# Patient Record
Sex: MALE | Race: OTHER | HISPANIC OR LATINO | URBAN - METROPOLITAN AREA
[De-identification: names, ages, dates, MRNs, and addresses within clinical notes are randomized per-mention and may not be internally consistent; named-entity substitution may affect disease eponyms.]

---

## 2018-08-01 ENCOUNTER — INPATIENT (INPATIENT)
Facility: HOSPITAL | Age: 53
LOS: 3 days | Discharge: HOME | DRG: 134 | End: 2018-08-05
Attending: INTERNAL MEDICINE | Admitting: INTERNAL MEDICINE
Payer: MEDICAID

## 2018-08-01 VITALS
RESPIRATION RATE: 18 BRPM | WEIGHT: 160.06 LBS | HEIGHT: 64 IN | SYSTOLIC BLOOD PRESSURE: 112 MMHG | HEART RATE: 88 BPM | OXYGEN SATURATION: 98 % | DIASTOLIC BLOOD PRESSURE: 58 MMHG | TEMPERATURE: 99 F

## 2018-08-01 DIAGNOSIS — J36 PERITONSILLAR ABSCESS: ICD-10-CM

## 2018-08-01 DIAGNOSIS — K29.70 GASTRITIS, UNSPECIFIED, WITHOUT BLEEDING: ICD-10-CM

## 2018-08-01 DIAGNOSIS — K46.9 UNSPECIFIED ABDOMINAL HERNIA WITHOUT OBSTRUCTION OR GANGRENE: Chronic | ICD-10-CM

## 2018-08-01 DIAGNOSIS — Z29.9 ENCOUNTER FOR PROPHYLACTIC MEASURES, UNSPECIFIED: ICD-10-CM

## 2018-08-01 LAB
ALBUMIN SERPL ELPH-MCNC: 3.7 G/DL — SIGNIFICANT CHANGE UP (ref 3.5–5)
ALP SERPL-CCNC: 88 U/L — SIGNIFICANT CHANGE UP (ref 40–120)
ALT FLD-CCNC: 35 U/L DA — SIGNIFICANT CHANGE UP (ref 10–60)
ANION GAP SERPL CALC-SCNC: 5 MMOL/L — SIGNIFICANT CHANGE UP (ref 5–17)
AST SERPL-CCNC: 25 U/L — SIGNIFICANT CHANGE UP (ref 10–40)
BASOPHILS # BLD AUTO: 0.1 K/UL — SIGNIFICANT CHANGE UP (ref 0–0.2)
BASOPHILS NFR BLD AUTO: 0.6 % — SIGNIFICANT CHANGE UP (ref 0–2)
BILIRUB SERPL-MCNC: 1 MG/DL — SIGNIFICANT CHANGE UP (ref 0.2–1.2)
BUN SERPL-MCNC: 19 MG/DL — HIGH (ref 7–18)
CALCIUM SERPL-MCNC: 8.9 MG/DL — SIGNIFICANT CHANGE UP (ref 8.4–10.5)
CHLORIDE SERPL-SCNC: 106 MMOL/L — SIGNIFICANT CHANGE UP (ref 96–108)
CO2 SERPL-SCNC: 26 MMOL/L — SIGNIFICANT CHANGE UP (ref 22–31)
CREAT SERPL-MCNC: 0.84 MG/DL — SIGNIFICANT CHANGE UP (ref 0.5–1.3)
EOSINOPHIL # BLD AUTO: 0.1 K/UL — SIGNIFICANT CHANGE UP (ref 0–0.5)
EOSINOPHIL NFR BLD AUTO: 0.6 % — SIGNIFICANT CHANGE UP (ref 0–6)
GLUCOSE SERPL-MCNC: 81 MG/DL — SIGNIFICANT CHANGE UP (ref 70–99)
HCT VFR BLD CALC: 52.9 % — HIGH (ref 39–50)
HGB BLD-MCNC: 17 G/DL — SIGNIFICANT CHANGE UP (ref 13–17)
LYMPHOCYTES # BLD AUTO: 1.5 K/UL — SIGNIFICANT CHANGE UP (ref 1–3.3)
LYMPHOCYTES # BLD AUTO: 13 % — SIGNIFICANT CHANGE UP (ref 13–44)
MCHC RBC-ENTMCNC: 31.5 PG — SIGNIFICANT CHANGE UP (ref 27–34)
MCHC RBC-ENTMCNC: 32.2 GM/DL — SIGNIFICANT CHANGE UP (ref 32–36)
MCV RBC AUTO: 97.8 FL — SIGNIFICANT CHANGE UP (ref 80–100)
MONOCYTES # BLD AUTO: 1.1 K/UL — HIGH (ref 0–0.9)
MONOCYTES NFR BLD AUTO: 9.7 % — SIGNIFICANT CHANGE UP (ref 2–14)
NEUTROPHILS # BLD AUTO: 8.7 K/UL — HIGH (ref 1.8–7.4)
NEUTROPHILS NFR BLD AUTO: 76.1 % — SIGNIFICANT CHANGE UP (ref 43–77)
PLATELET # BLD AUTO: 327 K/UL — SIGNIFICANT CHANGE UP (ref 150–400)
POTASSIUM SERPL-MCNC: 4.2 MMOL/L — SIGNIFICANT CHANGE UP (ref 3.5–5.3)
POTASSIUM SERPL-SCNC: 4.2 MMOL/L — SIGNIFICANT CHANGE UP (ref 3.5–5.3)
PROT SERPL-MCNC: 8.3 G/DL — SIGNIFICANT CHANGE UP (ref 6–8.3)
RBC # BLD: 5.4 M/UL — SIGNIFICANT CHANGE UP (ref 4.2–5.8)
RBC # FLD: 11.8 % — SIGNIFICANT CHANGE UP (ref 10.3–14.5)
SODIUM SERPL-SCNC: 137 MMOL/L — SIGNIFICANT CHANGE UP (ref 135–145)
WBC # BLD: 11.4 K/UL — HIGH (ref 3.8–10.5)
WBC # FLD AUTO: 11.4 K/UL — HIGH (ref 3.8–10.5)

## 2018-08-01 PROCEDURE — 99285 EMERGENCY DEPT VISIT HI MDM: CPT

## 2018-08-01 PROCEDURE — 70491 CT SOFT TISSUE NECK W/DYE: CPT | Mod: 26

## 2018-08-01 RX ORDER — SODIUM CHLORIDE 9 MG/ML
1000 INJECTION INTRAMUSCULAR; INTRAVENOUS; SUBCUTANEOUS
Qty: 0 | Refills: 0 | Status: DISCONTINUED | OUTPATIENT
Start: 2018-08-01 | End: 2018-08-05

## 2018-08-01 RX ORDER — ACETAMINOPHEN 500 MG
650 TABLET ORAL EVERY 6 HOURS
Qty: 0 | Refills: 0 | Status: DISCONTINUED | OUTPATIENT
Start: 2018-08-01 | End: 2018-08-05

## 2018-08-01 RX ORDER — MORPHINE SULFATE 50 MG/1
4 CAPSULE, EXTENDED RELEASE ORAL ONCE
Qty: 0 | Refills: 0 | Status: DISCONTINUED | OUTPATIENT
Start: 2018-08-01 | End: 2018-08-01

## 2018-08-01 RX ORDER — DEXAMETHASONE 0.5 MG/5ML
4 ELIXIR ORAL EVERY 6 HOURS
Qty: 0 | Refills: 0 | Status: DISCONTINUED | OUTPATIENT
Start: 2018-08-01 | End: 2018-08-04

## 2018-08-01 RX ORDER — SODIUM CHLORIDE 9 MG/ML
3 INJECTION INTRAMUSCULAR; INTRAVENOUS; SUBCUTANEOUS EVERY 8 HOURS
Qty: 0 | Refills: 0 | Status: DISCONTINUED | OUTPATIENT
Start: 2018-08-01 | End: 2018-08-05

## 2018-08-01 RX ORDER — DEXAMETHASONE 0.5 MG/5ML
10 ELIXIR ORAL ONCE
Qty: 0 | Refills: 0 | Status: COMPLETED | OUTPATIENT
Start: 2018-08-01 | End: 2018-08-01

## 2018-08-01 RX ADMIN — MORPHINE SULFATE 4 MILLIGRAM(S): 50 CAPSULE, EXTENDED RELEASE ORAL at 15:12

## 2018-08-01 RX ADMIN — Medication 10 MILLIGRAM(S): at 14:28

## 2018-08-01 RX ADMIN — Medication 100 MILLIGRAM(S): at 17:29

## 2018-08-01 RX ADMIN — SODIUM CHLORIDE 3 MILLILITER(S): 9 INJECTION INTRAMUSCULAR; INTRAVENOUS; SUBCUTANEOUS at 17:45

## 2018-08-01 RX ADMIN — SODIUM CHLORIDE 75 MILLILITER(S): 9 INJECTION INTRAMUSCULAR; INTRAVENOUS; SUBCUTANEOUS at 23:09

## 2018-08-01 RX ADMIN — Medication 100 MILLIGRAM(S): at 21:58

## 2018-08-01 RX ADMIN — MORPHINE SULFATE 4 MILLIGRAM(S): 50 CAPSULE, EXTENDED RELEASE ORAL at 16:31

## 2018-08-01 NOTE — ED PROVIDER NOTE - PROGRESS NOTE DETAILS
Discussed with Dr. Myers; requesting CT scan of neck. Will evaluate after 1700. Discussed with ENT Dr. Myers; requesting CT scan of neck. Will evaluate after 1700. Dr Myers recommends admisson, he will evaluate as an inpatient.

## 2018-08-01 NOTE — ED ADULT NURSE NOTE - NSSISCREENINGQ3_ED_A_ED
Pt was scheduled on Dec 8/17 for a mammogram and cancelled, because she prefers an Ultrasound. In the appt notes she said that will request an order from her provider.   No

## 2018-08-01 NOTE — H&P ADULT - PROBLEM SELECTOR PLAN 1
neck swelling, muffled voice and dysphagia to solid food.   -Patient is protecting airway. ***  -CT neck positive for right sided peritonsillar abscess  -will c/w clindamycin 900 mg Iv Q8 hrs as patient is allergic to penicilline.   -f/u blood cultures  -c/w acetaminophen and cepacol for pain control  -will give decadrone 4g Q6 hrs x 3 days   -IV fluids for hydration.  -ENT Dr Myers consult appreciated  -ID dr Salcido

## 2018-08-01 NOTE — H&P ADULT - NSHPPHYSICALEXAM_GEN_ALL_CORE
· CONSTITUTIONAL: Well appearing, well nourished, awake, alert, oriented to person, place, time/situation and in no apparent distress.  · HENMT: - - -  · Face: no lymph node enlargement  · Throat Findings: UVULAR DEVIATION, PERITONSILLAR ABSCESS, TONSILLAR SWELLING, mild trismus, consistent with peritonsillar abscess, mild right pharyngeal tonsillar swelling  · NEUROLOGICAL: Alert and oriented, no focal deficits, no motor or sensory deficits.  · SKIN: Skin normal color for race, warm, dry and intact. No evidence of rash.  · PSYCHIATRIC: Alert and oriented to person, place, time/situation. normal mood and affect. no apparent risk to self or others.

## 2018-08-01 NOTE — ED ADULT TRIAGE NOTE - CHIEF COMPLAINT QUOTE
sent from urgent care for eval natalie tonsilar abscess , OMS consult . pt c/o tonsilitis x 4 days not responding to PO antibiotics

## 2018-08-01 NOTE — ED PROVIDER NOTE - OBJECTIVE STATEMENT
51 y/o M presents with PMHx of pancreatitis, H. pylori and no significant PSHx presents to the ED with complaints of tonsillar swelling and pain x 5 days. Patient was seen by at hospital and treated with course of Clarithromycin 4 days ago. Patient was seen by family doctor today and told to have worsening retropharyngeal abscess. Patient denies fever, chills or any other complaints. Allergies: PCN (hives and rashes).

## 2018-08-01 NOTE — ED ADULT NURSE NOTE - NSIMPLEMENTINTERV_GEN_ALL_ED
Implemented All Universal Safety Interventions:  [place] to call system. Call bell, personal items and telephone within reach. Instruct patient to call for assistance. Room bathroom lighting operational. Non-slip footwear when patient is off stretcher. Physically safe environment: no spills, clutter or unnecessary equipment. Stretcher in lowest position, wheels locked, appropriate side rails in place.

## 2018-08-01 NOTE — H&P ADULT - HISTORY OF PRESENT ILLNESS
Patient is a 53 y/o M from Home with PMH of gastritis, pancreatitis 2/2 alcohol intake presented with complaint of  dysphagia, right sided facial pain, ear pain and neck pain. Patient complaint of severe neck pain and face pain started 2 days ago progressively worsening to a point that now He has difficulty swallow solid food. Patient also reported that his voice has become muffled. Patient denied any recent sick contacts, similar episodes in the past, diarrhea.In ED patient's vitals were stable, wbc count of 12K. CT neck was performed which showed a natalie-tonsillar abscess on right side and Sub mandibular gland inflammation. Patient recieved 1 dose of clindamycin in ED and Decadron. patient is protecting his airway. ENT was consulted by ED provider. No need of Urgent I&D, as per ENT Dr metcalf.

## 2018-08-01 NOTE — H&P ADULT - ASSESSMENT
Patient is a 53 y/o M from Home with PMH of gastritis, pancreatitis 2/2 alcohol intake presented with complaint of  dysphagia, right sided facial pain, ear pain and neck pain. Patient complaint of severe neck pain and face pain started 2 days ago progressively worsening to a point that now He has difficulty swallow solid food. Patient also reported that his voice has become muffled.

## 2018-08-01 NOTE — ED PROVIDER NOTE - MEDICAL DECISION MAKING DETAILS
51 y/o M presents with right peritonsillar abscess with mild trismus; will contact ears, nose, throat for consultation.

## 2018-08-01 NOTE — ED PROVIDER NOTE - THROAT FINDINGS
mild trismus, consistent with peritonsillar abscess, mild right pharyngeal tonsillar swelling/UVULAR DEVIATION/TONSILLAR SWELLING/PERITONSILLAR ABSCESS

## 2018-08-01 NOTE — H&P ADULT - PROBLEM SELECTOR PLAN 3
RISK                                                          Points  [] Previous VTE                                           3  [] Thrombophilia                                        2  [] Lower limb paralysis                              2   [] Current Cancer                                       2   [] Immobilization > 24 hrs                        1  [] ICU/CCU stay > 24 hours                       1  [] Age > 60                                                   1    IMPROVE score 0. No need of DVT prophylaxis.

## 2018-08-02 DIAGNOSIS — R07.0 PAIN IN THROAT: ICD-10-CM

## 2018-08-02 DIAGNOSIS — K29.70 GASTRITIS, UNSPECIFIED, WITHOUT BLEEDING: ICD-10-CM

## 2018-08-02 LAB
ALBUMIN SERPL ELPH-MCNC: 3.5 G/DL — SIGNIFICANT CHANGE UP (ref 3.5–5)
ALP SERPL-CCNC: 85 U/L — SIGNIFICANT CHANGE UP (ref 40–120)
ALT FLD-CCNC: 35 U/L DA — SIGNIFICANT CHANGE UP (ref 10–60)
ANION GAP SERPL CALC-SCNC: 8 MMOL/L — SIGNIFICANT CHANGE UP (ref 5–17)
AST SERPL-CCNC: 20 U/L — SIGNIFICANT CHANGE UP (ref 10–40)
BASOPHILS # BLD AUTO: 0 K/UL — SIGNIFICANT CHANGE UP (ref 0–0.2)
BASOPHILS NFR BLD AUTO: 0.1 % — SIGNIFICANT CHANGE UP (ref 0–2)
BILIRUB SERPL-MCNC: 0.7 MG/DL — SIGNIFICANT CHANGE UP (ref 0.2–1.2)
BUN SERPL-MCNC: 21 MG/DL — HIGH (ref 7–18)
CALCIUM SERPL-MCNC: 9.5 MG/DL — SIGNIFICANT CHANGE UP (ref 8.4–10.5)
CHLORIDE SERPL-SCNC: 103 MMOL/L — SIGNIFICANT CHANGE UP (ref 96–108)
CO2 SERPL-SCNC: 25 MMOL/L — SIGNIFICANT CHANGE UP (ref 22–31)
CREAT SERPL-MCNC: 0.85 MG/DL — SIGNIFICANT CHANGE UP (ref 0.5–1.3)
EOSINOPHIL # BLD AUTO: 0 K/UL — SIGNIFICANT CHANGE UP (ref 0–0.5)
EOSINOPHIL NFR BLD AUTO: 0 % — SIGNIFICANT CHANGE UP (ref 0–6)
GLUCOSE SERPL-MCNC: 164 MG/DL — HIGH (ref 70–99)
HBA1C BLD-MCNC: 6.1 % — HIGH (ref 4–5.6)
HCT VFR BLD CALC: 51.1 % — HIGH (ref 39–50)
HGB BLD-MCNC: 16.7 G/DL — SIGNIFICANT CHANGE UP (ref 13–17)
LYMPHOCYTES # BLD AUTO: 1 K/UL — SIGNIFICANT CHANGE UP (ref 1–3.3)
LYMPHOCYTES # BLD AUTO: 10.1 % — LOW (ref 13–44)
MAGNESIUM SERPL-MCNC: 2.5 MG/DL — SIGNIFICANT CHANGE UP (ref 1.6–2.6)
MCHC RBC-ENTMCNC: 31.9 PG — SIGNIFICANT CHANGE UP (ref 27–34)
MCHC RBC-ENTMCNC: 32.7 GM/DL — SIGNIFICANT CHANGE UP (ref 32–36)
MCV RBC AUTO: 97.6 FL — SIGNIFICANT CHANGE UP (ref 80–100)
MONOCYTES # BLD AUTO: 0.1 K/UL — SIGNIFICANT CHANGE UP (ref 0–0.9)
MONOCYTES NFR BLD AUTO: 1.2 % — LOW (ref 2–14)
NEUTROPHILS # BLD AUTO: 8.5 K/UL — HIGH (ref 1.8–7.4)
NEUTROPHILS NFR BLD AUTO: 88.5 % — HIGH (ref 43–77)
PHOSPHATE SERPL-MCNC: 3.4 MG/DL — SIGNIFICANT CHANGE UP (ref 2.5–4.5)
PLATELET # BLD AUTO: 355 K/UL — SIGNIFICANT CHANGE UP (ref 150–400)
POTASSIUM SERPL-MCNC: 4.8 MMOL/L — SIGNIFICANT CHANGE UP (ref 3.5–5.3)
POTASSIUM SERPL-SCNC: 4.8 MMOL/L — SIGNIFICANT CHANGE UP (ref 3.5–5.3)
PROT SERPL-MCNC: 8.4 G/DL — HIGH (ref 6–8.3)
RBC # BLD: 5.23 M/UL — SIGNIFICANT CHANGE UP (ref 4.2–5.8)
RBC # FLD: 11.8 % — SIGNIFICANT CHANGE UP (ref 10.3–14.5)
SODIUM SERPL-SCNC: 136 MMOL/L — SIGNIFICANT CHANGE UP (ref 135–145)
WBC # BLD: 9.5 K/UL — SIGNIFICANT CHANGE UP (ref 3.8–10.5)
WBC # FLD AUTO: 9.5 K/UL — SIGNIFICANT CHANGE UP (ref 3.8–10.5)

## 2018-08-02 RX ADMIN — SODIUM CHLORIDE 3 MILLILITER(S): 9 INJECTION INTRAMUSCULAR; INTRAVENOUS; SUBCUTANEOUS at 06:00

## 2018-08-02 RX ADMIN — Medication 100 MILLIGRAM(S): at 13:01

## 2018-08-02 RX ADMIN — Medication 4 MILLIGRAM(S): at 11:13

## 2018-08-02 RX ADMIN — SODIUM CHLORIDE 3 MILLILITER(S): 9 INJECTION INTRAMUSCULAR; INTRAVENOUS; SUBCUTANEOUS at 21:44

## 2018-08-02 RX ADMIN — Medication 100 MILLIGRAM(S): at 21:39

## 2018-08-02 RX ADMIN — Medication 4 MILLIGRAM(S): at 00:57

## 2018-08-02 RX ADMIN — Medication 30 MILLILITER(S): at 23:56

## 2018-08-02 RX ADMIN — SODIUM CHLORIDE 3 MILLILITER(S): 9 INJECTION INTRAMUSCULAR; INTRAVENOUS; SUBCUTANEOUS at 15:03

## 2018-08-02 RX ADMIN — Medication 4 MILLIGRAM(S): at 17:25

## 2018-08-02 RX ADMIN — Medication 100 MILLIGRAM(S): at 05:56

## 2018-08-02 RX ADMIN — Medication 4 MILLIGRAM(S): at 23:56

## 2018-08-02 RX ADMIN — Medication 4 MILLIGRAM(S): at 05:57

## 2018-08-02 NOTE — CONSULT NOTE ADULT - RS GEN PE MLT RESP DETAILS PC
no rhonchi/no rales/no wheezes/clear to auscultation bilaterally/good air movement no rhonchi/no wheezes/clear to auscultation bilaterally/no rales/good air movement/breath sounds equal

## 2018-08-02 NOTE — PROGRESS NOTE ADULT - SUBJECTIVE AND OBJECTIVE BOX
Patient is a 52y old  Male who presents with a chief complaint of right sided neck and face pain (01 Aug 2018 22:20)    pt seen in icu [  ], reg med floor [  x ], bed [  x], chair at bedside [   ], a+o x3 [ x ], lethargic [  ],  nad [ x ]      Allergies    penicillin (Hives; Rash)        Vitals    T(F): 98.2 (08-02-18 @ 14:18), Max: 98.7 (08-01-18 @ 18:00)  HR: 86 (08-02-18 @ 14:18) (74 - 91)  BP: 114/84 (08-02-18 @ 14:18) (94/62 - 118/81)  RR: 16 (08-02-18 @ 14:18) (15 - 17)  SpO2: 100% (08-02-18 @ 14:18) (95% - 100%)  Wt(kg): --  CAPILLARY BLOOD GLUCOSE          Labs                          16.7   9.5   )-----------( 355      ( 02 Aug 2018 08:03 )             51.1       08-02    136  |  103  |  21<H>  ----------------------------<  164<H>  4.8   |  25  |  0.85    Ca    9.5      02 Aug 2018 08:03  Phos  3.4     08-02  Mg     2.5     08-02    TPro  8.4<H>  /  Alb  3.5  /  TBili  0.7  /  DBili  x   /  AST  20  /  ALT  35  /  AlkPhos  85  08-02                Radiology Results      Meds    MEDICATIONS  (STANDING):  clindamycin IVPB 900 milliGRAM(s) IV Intermittent every 8 hours  dexamethasone  Injectable 4 milliGRAM(s) IV Push every 6 hours  sodium chloride 0.9% lock flush 3 milliLiter(s) IV Push every 8 hours  sodium chloride 0.9%. 1000 milliLiter(s) (75 mL/Hr) IV Continuous <Continuous>      MEDICATIONS  (PRN):  acetaminophen   Tablet 650 milliGRAM(s) Oral every 6 hours PRN For Temp greater than 38 C (100.4 F)  acetaminophen   Tablet. 650 milliGRAM(s) Oral every 6 hours PRN Severe Pain (7 - 10)      Physical Exam    HEENT : right tonsillar swelling with surgical scar  Neuro :  no focal deficits  Respiratory: CTA B/L  CV: RRR, S1S2, no murmurs,   Abdominal: Soft, NT, ND +BS,  Extremities: No edema, + peripheral pulses    ASSESSMENT    Peritonsillar abscess  h/o Gastritis, Helicobacter pylori  Pancreatitis  Hernia  No significant past surgical history      PLAN    ent cons noted  s/p incision and drainage of Right PTA at bedside  f/u in ENT clinic in 1 week [phone]  cont clindamycin 900 mgs iv q8hrs  cont decadron 4mgs iv q6hrs x 1-2 days more  once doing better will plan to dc home in 1-2 days on po abxs   tylnol prn pain  cont current meds Patient is a 51 y/o M from Home with PMH of gastritis, pancreatitis 2/2 alcohol intake presented with complaint of  dysphagia, right sided facial pain, ear pain and neck pain. Patient complaint of severe neck pain and face pain started 2 days ago progressively worsening to a point that now He has difficulty swallow solid food. Patient also reported that his voice has become muffled. Patient denied any recent sick contacts, similar episodes in the past, diarrhea.In ED patient's vitals were stable, wbc count of 12K. CT neck was performed which showed a natalie-tonsillar abscess on right side and Sub mandibular gland inflammation. Patient recieved 1 dose of clindamycin in ED and Decadron. patient is protecting his airway. ENT was consulted by ED provider. No need of Urgent I&D, as per ENT Dr metcalf.      Review of Systems:  Other Review of Systems: All other review of systems negative, except as noted in HPI	      pt seen in icu [  ], reg med floor [  x ], bed [  x], chair at bedside [   ], a+o x3 [ x ], lethargic [  ],  nad [ x ]      Allergies    penicillin (Hives; Rash)        Vitals    T(F): 98.2 (08-02-18 @ 14:18), Max: 98.7 (08-01-18 @ 18:00)  HR: 86 (08-02-18 @ 14:18) (74 - 91)  BP: 114/84 (08-02-18 @ 14:18) (94/62 - 118/81)  RR: 16 (08-02-18 @ 14:18) (15 - 17)  SpO2: 100% (08-02-18 @ 14:18) (95% - 100%)  Wt(kg): --  CAPILLARY BLOOD GLUCOSE          Labs                          16.7   9.5   )-----------( 355      ( 02 Aug 2018 08:03 )             51.1       08-02    136  |  103  |  21<H>  ----------------------------<  164<H>  4.8   |  25  |  0.85    Ca    9.5      02 Aug 2018 08:03  Phos  3.4     08-02  Mg     2.5     08-02    TPro  8.4<H>  /  Alb  3.5  /  TBili  0.7  /  DBili  x   /  AST  20  /  ALT  35  /  AlkPhos  85  08-02                Radiology Results      Meds    MEDICATIONS  (STANDING):  clindamycin IVPB 900 milliGRAM(s) IV Intermittent every 8 hours  dexamethasone  Injectable 4 milliGRAM(s) IV Push every 6 hours  sodium chloride 0.9% lock flush 3 milliLiter(s) IV Push every 8 hours  sodium chloride 0.9%. 1000 milliLiter(s) (75 mL/Hr) IV Continuous <Continuous>      MEDICATIONS  (PRN):  acetaminophen   Tablet 650 milliGRAM(s) Oral every 6 hours PRN For Temp greater than 38 C (100.4 F)  acetaminophen   Tablet. 650 milliGRAM(s) Oral every 6 hours PRN Severe Pain (7 - 10)      Physical Exam    HEENT : right tonsillar swelling with surgical scar  Neuro :  no focal deficits  Respiratory: CTA B/L  CV: RRR, S1S2, no murmurs,   Abdominal: Soft, NT, ND +BS,  Extremities: No edema, + peripheral pulses    ASSESSMENT    Peritonsillar abscess  h/o Gastritis, Helicobacter pylori  Pancreatitis  Hernia  No significant past surgical history      PLAN    ent cons noted  s/p incision and drainage of Right PTA at bedside  f/u in ENT clinic in 1 week [phone]  cont clindamycin 900 mgs iv q8hrs  cont decadron 4mgs iv q6hrs x 1-2 days more  once doing better will plan to dc home in 1-2 days on po abxs   tylnol prn pain  cont current meds

## 2018-08-02 NOTE — CONSULT NOTE ADULT - SUBJECTIVE AND OBJECTIVE BOX
History of Present Illness: 	  Patient is a 51 y/o M from Home with PMH of gastritis, pancreatitis 2/2 alcohol intake presented with complaint of  dysphagia, right sided facial pain, ear pain and neck pain. Patient complaint of severe neck pain and face pain started 2 days ago progressively worsening to a point that now He has difficulty swallow solid food. Patient also reported that his voice has become muffled. Patient denied any recent sick contacts, similar episodes in the past, diarrhea.In ED patient's vitals were stable, wbc count of 12K. CT neck was performed which showed a natalie-tonsillar abscess on right side and Sub mandibular gland inflammation. Patient recieved 1 dose of clindamycin in ED and Decadron. patient is protecting his airway. ENT was consulted by ED provider. No need of Urgent I&D, as per ENT Dr metcalf.       Review of Systems:  Other Review of Systems: All other review of systems negative, except as noted in HPI	      Allergies and Intolerances:        Allergies:  	penicillin: Drug, Hives, Rash    Home Medications:   * Patient Currently Takes Medications as of 01-Aug-2018 22:26 documented in Structured Notes  · 	pantoprazole 40 mg oral delayed release tablet: 1 tab(s) orally once a day, Last Dose Taken:      .    Patient History:    Past Medical History:  Gastritis, Helicobacter pylori    Pancreatitis.     Past Surgical History:  Hernia.     Family History:  Mother  Still living? Unknown  Family history of irritable colon, Age at diagnosis: Age Unknown.     Social History:  Social History (marital status, living situation, occupation, tobacco use, alcohol and drug use, and sexual history): , works as a , prior ETOH use, No Smoking, no recreational substance use	     Tobacco Screening:  · Core Measure Site	Yes	  · Has the patient used tobacco in the past 30 days?	No	    Risk Assessment:    Present on Admission:  Deep Venous Thrombosis	no	  Pulmonary Embolus	no	  Urinary Catheter	no	  Central Venous Catheter/PICC Line	no	  Surgical Site Incision	no	  Pressure Ulcer(s)	no	     Heart Failure:  Does this patient have a history of or has been diagnosed with heart failure? no.    HIV Screen (per [facility], HIV screening must be offered to every individual between ages 13 and 64)	Offered and patient declined	  Hepatitis C Screen (per [facility], hepatitis C screening must be offered to every individual born between 1945 and 1965)	Offered and patient declined	       Physical Exam:  Vital Signs Last 24 Hrs T(C): 36.8 (02 Aug 2018 00:11), Max: 37.1 (01 Aug 2018 11:58) T(F): 98.2 (02 Aug 2018 00:11), Max: 98.8 (01 Aug 2018 11:58) HR: 82 (02 Aug 2018 00:11) (81 - 91) BP: 112/77 (02 Aug 2018 00:11) (112/58 - 118/81) BP(mean): -- RR: 15 (02 Aug 2018 00:11) (15 - 18) SpO2: 95% (02 Aug 2018 00:11) (95% - 98%)  · CONSTITUTIONAL: Well appearing, well nourished, awake, alert, oriented to person, place, time/situation and in no apparent distress.    Ears: au tmi, eac clear, no marianne     Nose: clear b/l  · Face: no lymph node enlargement  · Throat Findings: UVULAR DEVIATION, PERITONSILLAR ABSCESS, TONSILLAR SWELLING, mild trismus, consistent with peritonsillar abscess, mild right pharyngeal tonsillar swelling  · NEUROLOGICAL: Alert and oriented, no focal deficits, no motor or sensory deficits.  · SKIN: Skin normal color for race, warm, dry and intact. No evidence of rash. · PSYCHIATRIC: Alert and oriented to person, place, time/situation. normal mood and affect. no apparent risk to self or others.	      Assessment:  52 year old male with right Peritonsillar abscess     Plan:  1) Incision and drainage of Right PTA at bedside  2) soft diet x 2 days then adat  3) decadron 10mg IV q8hrs x 3 doses  4) clindamycin x 7 datys  5) f/u in ENT clinic in 1 week [phone]

## 2018-08-02 NOTE — CONSULT NOTE ADULT - SUBJECTIVE AND OBJECTIVE BOX
HPI:  Patient is a 53 y/o M from Home with PMH of gastritis, pancreatitis 2/2 alcohol intake presented with complaint of  dysphagia, right sided facial pain, ear pain and neck pain. Patient complaint of severe neck pain and face pain started 2 days ago progressively worsening to a point that now He has difficulty swallow solid food. Patient also reported that his voice has become muffled. Patient denied any recent sick contacts, similar episodes in the past, diarrhea.In ED patient's vitals were stable, wbc count of 12K. CT neck was performed which showed a natalie-tonsillar abscess on right side and Sub mandibular gland inflammation. Patient recieved 1 dose of clindamycin in ED and Decadron. patient is protecting his airway. ENT was consulted by ED provider. No need of Urgent I&D, as per ENT Dr metcalf. (01 Aug 2018 22:20)      PAST MEDICAL & SURGICAL HISTORY:  Gastritis, Helicobacter pylori  Pancreatitis  Hernia      penicillin (Hives; Rash)      Meds:  acetaminophen   Tablet 650 milliGRAM(s) Oral every 6 hours PRN  acetaminophen   Tablet. 650 milliGRAM(s) Oral every 6 hours PRN  clindamycin IVPB 900 milliGRAM(s) IV Intermittent every 8 hours  dexamethasone  Injectable 4 milliGRAM(s) IV Push every 6 hours  sodium chloride 0.9% lock flush 3 milliLiter(s) IV Push every 8 hours  sodium chloride 0.9%. 1000 milliLiter(s) IV Continuous <Continuous>      SOCIAL HISTORY:  Smoker:   ETOH use:      FAMILY HISTORY:  Family history of irritable colon (Mother)      VITALS:  Vital Signs Last 24 Hrs  T(C): 36.4 (02 Aug 2018 05:23), Max: 37.1 (01 Aug 2018 11:58)  T(F): 97.6 (02 Aug 2018 05:23), Max: 98.8 (01 Aug 2018 11:58)  HR: 74 (02 Aug 2018 05:23) (74 - 91)  BP: 94/62 (02 Aug 2018 05:23) (94/62 - 118/81)  BP(mean): --  RR: 15 (02 Aug 2018 05:23) (15 - 18)  SpO2: 97% (02 Aug 2018 05:23) (95% - 98%)    LABS/DIAGNOSTIC TESTS:                          16.7   9.5   )-----------( 355      ( 02 Aug 2018 08:03 )             51.1     WBC Count: 9.5 K/uL (08-02 @ 08:03)  WBC Count: 11.4 K/uL (08-01 @ 14:43)      08-02    136  |  103  |  21<H>  ----------------------------<  164<H>  4.8   |  25  |  0.85    Ca    9.5      02 Aug 2018 08:03  Phos  3.4     08-02  Mg     2.5     08-02    TPro  8.4<H>  /  Alb  3.5  /  TBili  0.7  /  DBili  x   /  AST  20  /  ALT  35  /  AlkPhos  85  08-02          LIVER FUNCTIONS - ( 02 Aug 2018 08:03 )  Alb: 3.5 g/dL / Pro: 8.4 g/dL / ALK PHOS: 85 U/L / ALT: 35 U/L DA / AST: 20 U/L / GGT: x               CULTURES: none sent or ordered      RADIOLOGY:  - ct neck with contrast - enhancing fluid collection by right palatine tonsil with calcifications; pharyngitis with marked right thickening    ROS HPI:  Patient is a 53 y/o M from Home with PMH of gastritis, pancreatitis 2/2 alcohol intake presented with complaint of  dysphagia, right sided facial pain, ear pain and neck pain. Patient complaint of severe neck pain and face pain started 2 days ago progressively worsening to a point that now He has difficulty swallow solid food. Patient also reported that his voice has become muffled. Patient denied any recent sick contacts, similar episodes in the past, diarrhea.In ED patient's vitals were stable, wbc count of 12K. CT neck was performed which showed a natalie-tonsillar abscess on right side and Sub mandibular gland inflammation. Patient recieved 1 dose of clindamycin in ED and Decadron. patient is protecting his airway. ENT was consulted by ED provider. No need of Urgent I&D, as per ENT Dr metcalf.    Asked to see patient regarding peritonsilar abscess. Incision and drainage of right peritonsilar abscess done by ENT last night. Patient reports some mild pain in the right face and throat, particularly when he opens the mouth wide, but says the pain is significantly improved and mostly feels sore from the I and D. Reports that his right sided headache is improved and right ear pain is gone, and that it is less painful to swallow and he can drink liquids now. Denies nausea, vomiting, diarrhea, fever, chills, SOB, chest pain, dizziness, decreased appetite.    PAST MEDICAL & SURGICAL HISTORY:  Gastritis, Helicobacter pylori  Pancreatitis  Hernia      penicillin (Hives; Rash)      Meds:  acetaminophen   Tablet 650 milliGRAM(s) Oral every 6 hours PRN  acetaminophen   Tablet. 650 milliGRAM(s) Oral every 6 hours PRN  clindamycin IVPB 900 milliGRAM(s) IV Intermittent every 8 hours  dexamethasone  Injectable 4 milliGRAM(s) IV Push every 6 hours  sodium chloride 0.9% lock flush 3 milliLiter(s) IV Push every 8 hours  sodium chloride 0.9%. 1000 milliLiter(s) IV Continuous <Continuous>      SOCIAL HISTORY:  Smoker:   ETOH use:      FAMILY HISTORY:  Family history of irritable colon (Mother)      VITALS:  Vital Signs Last 24 Hrs  T(C): 36.4 (02 Aug 2018 05:23), Max: 37.1 (01 Aug 2018 11:58)  T(F): 97.6 (02 Aug 2018 05:23), Max: 98.8 (01 Aug 2018 11:58)  HR: 74 (02 Aug 2018 05:23) (74 - 91)  BP: 94/62 (02 Aug 2018 05:23) (94/62 - 118/81)  BP(mean): --  RR: 15 (02 Aug 2018 05:23) (15 - 18)  SpO2: 97% (02 Aug 2018 05:23) (95% - 98%)    LABS/DIAGNOSTIC TESTS:                          16.7   9.5   )-----------( 355      ( 02 Aug 2018 08:03 )             51.1     WBC Count: 9.5 K/uL (08-02 @ 08:03)  WBC Count: 11.4 K/uL (08-01 @ 14:43)      08-02    136  |  103  |  21<H>  ----------------------------<  164<H>  4.8   |  25  |  0.85    Ca    9.5      02 Aug 2018 08:03  Phos  3.4     08-02  Mg     2.5     08-02    TPro  8.4<H>  /  Alb  3.5  /  TBili  0.7  /  DBili  x   /  AST  20  /  ALT  35  /  AlkPhos  85  08-02          LIVER FUNCTIONS - ( 02 Aug 2018 08:03 )  Alb: 3.5 g/dL / Pro: 8.4 g/dL / ALK PHOS: 85 U/L / ALT: 35 U/L DA / AST: 20 U/L / GGT: x               CULTURES: none sent or ordered      RADIOLOGY:  - ct neck with contrast - enhancing fluid collection by right palatine tonsil with calcifications; pharyngitis with marked right thickening    ROS HPI:  Patient is a 51 y/o M from Home with PMH of gastritis, pancreatitis 2/2 alcohol intake presented with complaint of  dysphagia, right sided facial pain, ear pain and neck pain. Patient complaint of severe neck pain and face pain started 2 days ago progressively worsening to a point that now He has difficulty swallow solid food. Patient also reported that his voice has become muffled. Patient denied any recent sick contacts, similar episodes in the past, diarrhea.In ED patient's vitals were stable, wbc count of 12K. CT neck was performed which showed a natalie-tonsillar abscess on right side and Sub mandibular gland inflammation. Patient recieved 1 dose of clindamycin in ED and Decadron. patient is protecting his airway. ENT was consulted by ED provider. No need of Urgent I&D, as per ENT Dr metcalf.    Asked to see patient regarding peritonsilar abscess. Incision and drainage of right peritonsilar abscess done by ENT last night. Patient reports some mild pain in the right face and throat, particularly when he opens the mouth wide, but says the pain is significantly improved and mostly feels sore from the I and D. Reports that his right sided headache is improved and right ear pain is gone, and that it is less painful to swallow and he can drink liquids now. Denies nausea, vomiting, diarrhea, fever, chills, SOB, chest pain, dizziness, decreased appetite.    PAST MEDICAL & SURGICAL HISTORY:  Gastritis, Helicobacter pylori  Pancreatitis  Hernia      penicillin (Hives; Rash)      Meds:  acetaminophen   Tablet 650 milliGRAM(s) Oral every 6 hours PRN  acetaminophen   Tablet. 650 milliGRAM(s) Oral every 6 hours PRN  clindamycin IVPB 900 milliGRAM(s) IV Intermittent every 8 hours  dexamethasone  Injectable 4 milliGRAM(s) IV Push every 6 hours  sodium chloride 0.9% lock flush 3 milliLiter(s) IV Push every 8 hours  sodium chloride 0.9%. 1000 milliLiter(s) IV Continuous <Continuous>      SOCIAL HISTORY:  Smoker: never	  ETOH use: occasional/social     FAMILY HISTORY:  Family history of irritable colon (Mother)      VITALS:  Vital Signs Last 24 Hrs  T(C): 36.4 (02 Aug 2018 05:23), Max: 37.1 (01 Aug 2018 11:58)  T(F): 97.6 (02 Aug 2018 05:23), Max: 98.8 (01 Aug 2018 11:58)  HR: 74 (02 Aug 2018 05:23) (74 - 91)  BP: 94/62 (02 Aug 2018 05:23) (94/62 - 118/81)  BP(mean): --  RR: 15 (02 Aug 2018 05:23) (15 - 18)  SpO2: 97% (02 Aug 2018 05:23) (95% - 98%)    LABS/DIAGNOSTIC TESTS:                          16.7   9.5   )-----------( 355      ( 02 Aug 2018 08:03 )             51.1     WBC Count: 9.5 K/uL (08-02 @ 08:03)  WBC Count: 11.4 K/uL (08-01 @ 14:43)      08-02    136  |  103  |  21<H>  ----------------------------<  164<H>  4.8   |  25  |  0.85    Ca    9.5      02 Aug 2018 08:03  Phos  3.4     08-02  Mg     2.5     08-02    TPro  8.4<H>  /  Alb  3.5  /  TBili  0.7  /  DBili  x   /  AST  20  /  ALT  35  /  AlkPhos  85  08-02          LIVER FUNCTIONS - ( 02 Aug 2018 08:03 )  Alb: 3.5 g/dL / Pro: 8.4 g/dL / ALK PHOS: 85 U/L / ALT: 35 U/L DA / AST: 20 U/L / GGT: x               CULTURES: none sent or ordered      RADIOLOGY:  - ct neck with contrast - enhancing fluid collection by right palatine tonsil with calcifications; pharyngitis with marked right thickening    ROS HPI:  Patient is a 51 y/o M from Home with PMH of gastritis, pancreatitis 2/2 alcohol intake presented with complaint of  dysphagia, right sided facial pain, ear pain and neck pain. Patient complaint of severe neck pain and face pain started 2 days ago progressively worsening to a point that now He has difficulty swallow solid food. Patient also reported that his voice has become muffled. Patient denied any recent sick contacts, similar episodes in the past, diarrhea.In ED patient's vitals were stable, wbc count of 12K. CT neck was performed which showed a natalie-tonsillar abscess on right side and Sub mandibular gland inflammation.     Asked to see patient regarding peritonsilar abscess. Incision and drainage of right peritonsilar abscess done by ENT last night. Patient reports some mild pain in the right face and throat, particularly when he opens the mouth wide, but says the pain is significantly improved and mostly feels sore from the I and D. Reports that his right sided headache is improved and right ear pain is gone, and that it is less painful to swallow and he can drink liquids now. Denies nausea, vomiting, diarrhea, fever, chills, SOB, chest pain, dizziness, decreased appetite.    PAST MEDICAL & SURGICAL HISTORY:  Gastritis, Helicobacter pylori  Pancreatitis  Hernia      penicillin (Hives; Rash)      Meds:  acetaminophen   Tablet 650 milliGRAM(s) Oral every 6 hours PRN  acetaminophen   Tablet. 650 milliGRAM(s) Oral every 6 hours PRN  clindamycin IVPB 900 milliGRAM(s) IV Intermittent every 8 hours  dexamethasone  Injectable 4 milliGRAM(s) IV Push every 6 hours  sodium chloride 0.9% lock flush 3 milliLiter(s) IV Push every 8 hours  sodium chloride 0.9%. 1000 milliLiter(s) IV Continuous <Continuous>      SOCIAL HISTORY:  Smoker: never	  ETOH use: occasional/social     FAMILY HISTORY:  Family history of irritable colon (Mother)      VITALS:  Vital Signs Last 24 Hrs  T(C): 36.4 (02 Aug 2018 05:23), Max: 37.1 (01 Aug 2018 11:58)  T(F): 97.6 (02 Aug 2018 05:23), Max: 98.8 (01 Aug 2018 11:58)  HR: 74 (02 Aug 2018 05:23) (74 - 91)  BP: 94/62 (02 Aug 2018 05:23) (94/62 - 118/81)  BP(mean): --  RR: 15 (02 Aug 2018 05:23) (15 - 18)  SpO2: 97% (02 Aug 2018 05:23) (95% - 98%)    LABS/DIAGNOSTIC TESTS:                          16.7   9.5   )-----------( 355      ( 02 Aug 2018 08:03 )             51.1     WBC Count: 9.5 K/uL (08-02 @ 08:03)  WBC Count: 11.4 K/uL (08-01 @ 14:43)      08-02    136  |  103  |  21<H>  ----------------------------<  164<H>  4.8   |  25  |  0.85    Ca    9.5      02 Aug 2018 08:03  Phos  3.4     08-02  Mg     2.5     08-02    TPro  8.4<H>  /  Alb  3.5  /  TBili  0.7  /  DBili  x   /  AST  20  /  ALT  35  /  AlkPhos  85  08-02          LIVER FUNCTIONS - ( 02 Aug 2018 08:03 )  Alb: 3.5 g/dL / Pro: 8.4 g/dL / ALK PHOS: 85 U/L / ALT: 35 U/L DA / AST: 20 U/L / GGT: x               CULTURES: none sent or ordered      RADIOLOGY:  - ct neck with contrast - enhancing fluid collection by right palatine tonsil with calcifications; pharyngitis with marked right thickening    ROS

## 2018-08-02 NOTE — CONSULT NOTE ADULT - ASSESSMENT
right tonsillitis / right tonsillar abscess - s/p I&D at bedside    plan - cont clindamycin 900 mgs iv q8hrs  cont decadron 4mgs iv q6hrs x 1-2 days more  once doing better will plan to dc home in 1-2 days on po abxs

## 2018-08-02 NOTE — PROGRESS NOTE ADULT - SUBJECTIVE AND OBJECTIVE BOX
Patient is a 52y old  Male who presents with a chief complaint of right sided neck and face pain (01 Aug 2018 22:20)    penicillin (Hives; Rash)      INTERVAL HPI/OVERNIGHT EVENTS:    T(C): 36.8 (08-02-18 @ 14:18), Max: 37.1 (08-01-18 @ 18:00)  HR: 86 (08-02-18 @ 14:18) (74 - 91)  BP: 114/84 (08-02-18 @ 14:18) (94/62 - 118/81)  RR: 16 (08-02-18 @ 14:18) (15 - 17)  SpO2: 100% (08-02-18 @ 14:18) (95% - 100%)  I&O's Summary    Vital Signs Last 24 Hrs  T(C): 36.8 (02 Aug 2018 14:18), Max: 37.1 (01 Aug 2018 18:00)  T(F): 98.2 (02 Aug 2018 14:18), Max: 98.7 (01 Aug 2018 18:00)  HR: 86 (02 Aug 2018 14:18) (74 - 91)  BP: 114/84 (02 Aug 2018 14:18) (94/62 - 118/81)  BP(mean): --  RR: 16 (02 Aug 2018 14:18) (15 - 17)  SpO2: 100% (02 Aug 2018 14:18) (95% - 100%)  PAST MEDICAL & SURGICAL HISTORY:  Gastritis, Helicobacter pylori  Pancreatitis  Hernia          LABS:                        16.7   9.5   )-----------( 355      ( 02 Aug 2018 08:03 )             51.1     08-02    136  |  103  |  21<H>  ----------------------------<  164<H>  4.8   |  25  |  0.85    Ca    9.5      02 Aug 2018 08:03  Phos  3.4     08-02  Mg     2.5     08-02    TPro  8.4<H>  /  Alb  3.5  /  TBili  0.7  /  DBili  x   /  AST  20  /  ALT  35  /  AlkPhos  85  08-02      CAPILLARY BLOOD GLUCOSE                MEDICATIONS  (STANDING):  clindamycin IVPB 900 milliGRAM(s) IV Intermittent every 8 hours  dexamethasone  Injectable 4 milliGRAM(s) IV Push every 6 hours  sodium chloride 0.9% lock flush 3 milliLiter(s) IV Push every 8 hours  sodium chloride 0.9%. 1000 milliLiter(s) (75 mL/Hr) IV Continuous <Continuous>    MEDICATIONS  (PRN):  acetaminophen   Tablet 650 milliGRAM(s) Oral every 6 hours PRN For Temp greater than 38 C (100.4 F)  acetaminophen   Tablet. 650 milliGRAM(s) Oral every 6 hours PRN Severe Pain (7 - 10)      REVIEW OF SYSTEMS:  CONSTITUTIONAL: No fever, weight loss, or fatigue  EYES: No eye pain, visual disturbances, or discharge  ENMT:  No difficulty hearing, tinnitus, vertigo; throat pain when attempting to say "ahh". Cannot visualize tonsil. , no swallowing difficulty   NECK: No pain or stiffness  RESPIRATORY: No cough, wheezing, chills or hemoptysis; No shortness of breath  CARDIOVASCULAR: No chest pain, palpitations, dizziness, or leg swelling  GASTROINTESTINAL: No abdominal or epigastric pain. No nausea, vomiting, or hematemesis; No diarrhea or constipation. No melena or hematochezia.  GENITOURINARY: No dysuria, frequency, hematuria, or incontinence  NEUROLOGICAL: No headaches, memory loss, loss of strength, numbness, or tremors  SKIN: No itching, burning, rashes, or lesions   LYMPH NODES: No enlarged glands  ENDOCRINE: No heat or cold intolerance; No hair loss  MUSCULOSKELETAL: No joint pain or swelling; No muscle, back, or extremity pain  PSYCHIATRIC: No depression, anxiety, mood swings, or difficulty sleeping  HEME/LYMPH: No easy bruising, or bleeding gums  ALLERGY AND IMMUNOLOGIC: No hives or eczema    RADIOLOGY & ADDITIONAL TESTS:      Consultant(s) Notes Reviewed:  [ ] YES  [ ] NO    PHYSICAL EXAM:  GENERAL: NAD, well-groomed, well-developed  HEAD:  Atraumatic, Normocephalic  EYES: EOMI, PERRLA, conjunctiva and sclera clear  ENMT:  Cannot visualize as pain upon saying ahhh!  NECK: Supple, No JVD, Normal thyroid  NERVOUS SYSTEM:  Alert & Oriented X3, Good concentration; Motor Strength 5/5 B/L upper and lower extremities; DTRs 2+ intact and symmetric  CHEST/LUNG: Good air movement bilaterally; No rales, rhonchi, wheezing, or rubs  HEART: S1, S2; No murmurs, rubs, or gallops  ABDOMEN: Soft, Nontender, Nondistended; Bowel sounds present  EXTREMITIES:  2+ Peripheral Pulses, No clubbing, cyanosis, or edema  LYMPH: No lymphadenopathy noted  SKIN: No rashes or lesions    Care Collaborated Discussed with Consultants/Other Providers [ Y] YES  [ ] NO

## 2018-08-03 DIAGNOSIS — K29.20 ALCOHOLIC GASTRITIS WITHOUT BLEEDING: ICD-10-CM

## 2018-08-03 LAB
ANION GAP SERPL CALC-SCNC: 8 MMOL/L — SIGNIFICANT CHANGE UP (ref 5–17)
BUN SERPL-MCNC: 24 MG/DL — HIGH (ref 7–18)
CALCIUM SERPL-MCNC: 9 MG/DL — SIGNIFICANT CHANGE UP (ref 8.4–10.5)
CHLORIDE SERPL-SCNC: 104 MMOL/L — SIGNIFICANT CHANGE UP (ref 96–108)
CO2 SERPL-SCNC: 24 MMOL/L — SIGNIFICANT CHANGE UP (ref 22–31)
CREAT SERPL-MCNC: 0.98 MG/DL — SIGNIFICANT CHANGE UP (ref 0.5–1.3)
GLUCOSE SERPL-MCNC: 301 MG/DL — HIGH (ref 70–99)
HCT VFR BLD CALC: 50.5 % — HIGH (ref 39–50)
HGB BLD-MCNC: 16.6 G/DL — SIGNIFICANT CHANGE UP (ref 13–17)
MCHC RBC-ENTMCNC: 31.8 PG — SIGNIFICANT CHANGE UP (ref 27–34)
MCHC RBC-ENTMCNC: 32.8 GM/DL — SIGNIFICANT CHANGE UP (ref 32–36)
MCV RBC AUTO: 97 FL — SIGNIFICANT CHANGE UP (ref 80–100)
PLATELET # BLD AUTO: 376 K/UL — SIGNIFICANT CHANGE UP (ref 150–400)
POTASSIUM SERPL-MCNC: 4.3 MMOL/L — SIGNIFICANT CHANGE UP (ref 3.5–5.3)
POTASSIUM SERPL-SCNC: 4.3 MMOL/L — SIGNIFICANT CHANGE UP (ref 3.5–5.3)
RBC # BLD: 5.2 M/UL — SIGNIFICANT CHANGE UP (ref 4.2–5.8)
RBC # FLD: 11.6 % — SIGNIFICANT CHANGE UP (ref 10.3–14.5)
SODIUM SERPL-SCNC: 136 MMOL/L — SIGNIFICANT CHANGE UP (ref 135–145)
VIT B12 SERPL-MCNC: 551 PG/ML — SIGNIFICANT CHANGE UP (ref 232–1245)
WBC # BLD: 14.7 K/UL — HIGH (ref 3.8–10.5)
WBC # FLD AUTO: 14.7 K/UL — HIGH (ref 3.8–10.5)

## 2018-08-03 RX ORDER — PANTOPRAZOLE SODIUM 20 MG/1
40 TABLET, DELAYED RELEASE ORAL DAILY
Qty: 0 | Refills: 0 | Status: DISCONTINUED | OUTPATIENT
Start: 2018-08-03 | End: 2018-08-05

## 2018-08-03 RX ADMIN — Medication 4 MILLIGRAM(S): at 11:49

## 2018-08-03 RX ADMIN — Medication 100 MILLIGRAM(S): at 21:25

## 2018-08-03 RX ADMIN — Medication 4 MILLIGRAM(S): at 23:54

## 2018-08-03 RX ADMIN — Medication 100 MILLIGRAM(S): at 05:53

## 2018-08-03 RX ADMIN — SODIUM CHLORIDE 3 MILLILITER(S): 9 INJECTION INTRAMUSCULAR; INTRAVENOUS; SUBCUTANEOUS at 21:23

## 2018-08-03 RX ADMIN — SODIUM CHLORIDE 3 MILLILITER(S): 9 INJECTION INTRAMUSCULAR; INTRAVENOUS; SUBCUTANEOUS at 05:50

## 2018-08-03 RX ADMIN — Medication 100 MILLIGRAM(S): at 13:18

## 2018-08-03 RX ADMIN — PANTOPRAZOLE SODIUM 40 MILLIGRAM(S): 20 TABLET, DELAYED RELEASE ORAL at 13:18

## 2018-08-03 RX ADMIN — Medication 4 MILLIGRAM(S): at 05:53

## 2018-08-03 RX ADMIN — SODIUM CHLORIDE 3 MILLILITER(S): 9 INJECTION INTRAMUSCULAR; INTRAVENOUS; SUBCUTANEOUS at 13:18

## 2018-08-03 RX ADMIN — Medication 4 MILLIGRAM(S): at 17:18

## 2018-08-03 NOTE — PROGRESS NOTE ADULT - SUBJECTIVE AND OBJECTIVE BOX
Patient is a 52y old  Male who presents with a chief complaint of right sided neck and face pain (01 Aug 2018 22:20)    pt seen in icu [  ], reg med floor [  x ], bed [  x], chair at bedside [   ], a+o x3 [ x ], lethargic [  ],  nad [ x ]      Allergies    penicillin (Hives; Rash)        Vitals    T(F): 98.2 (08-03-18 @ 05:08), Max: 98.2 (08-02-18 @ 14:18)  HR: 80 (08-03-18 @ 05:08) (80 - 86)  BP: 105/73 (08-03-18 @ 05:08) (105/73 - 129/76)  RR: 15 (08-03-18 @ 05:08) (15 - 19)  SpO2: 100% (08-03-18 @ 05:08) (97% - 100%)  Wt(kg): --  CAPILLARY BLOOD GLUCOSE          Labs                          16.7   9.5   )-----------( 355      ( 02 Aug 2018 08:03 )             51.1       08-02    136  |  103  |  21<H>  ----------------------------<  164<H>  4.8   |  25  |  0.85    Ca    9.5      02 Aug 2018 08:03  Phos  3.4     08-02  Mg     2.5     08-02    TPro  8.4<H>  /  Alb  3.5  /  TBili  0.7  /  DBili  x   /  AST  20  /  ALT  35  /  AlkPhos  85  08-02                Radiology Results      Meds    MEDICATIONS  (STANDING):  clindamycin IVPB 900 milliGRAM(s) IV Intermittent every 8 hours  dexamethasone  Injectable 4 milliGRAM(s) IV Push every 6 hours  sodium chloride 0.9% lock flush 3 milliLiter(s) IV Push every 8 hours  sodium chloride 0.9%. 1000 milliLiter(s) (75 mL/Hr) IV Continuous <Continuous>      MEDICATIONS  (PRN):  acetaminophen   Tablet 650 milliGRAM(s) Oral every 6 hours PRN For Temp greater than 38 C (100.4 F)  acetaminophen   Tablet. 650 milliGRAM(s) Oral every 6 hours PRN Severe Pain (7 - 10)      Physical Exam      HEENT : right tonsillar swelling improving  Neuro :  no focal deficits  Respiratory: CTA B/L  CV: RRR, S1S2, no murmurs,   Abdominal: Soft, NT, ND +BS,  Extremities: No edema, + peripheral pulses    ASSESSMENT    Peritonsillar abscess  h/o Gastritis, Helicobacter pylori  Pancreatitis  Hernia  No significant past surgical history      PLAN    ent cons noted  s/p incision and drainage of Right PTA at bedside  f/u in ENT clinic in 1 week [phone]  cont clindamycin 900 mgs iv q8hrs  cont decadron 4mgs iv q6hrs x 1 days more  id f/u  once doing better will plan to dc home in 1-2 days on po abxs   tylnol prn pain  cont current meds

## 2018-08-03 NOTE — PROGRESS NOTE ADULT - PROBLEM SELECTOR PLAN 3
s/p I and D of peritonsillar abscess. Reports decreased pain but difficulty opening mouth for exam . Soft diet ordered.
continue Pantoprazole as per home meds

## 2018-08-03 NOTE — PROGRESS NOTE ADULT - SUBJECTIVE AND OBJECTIVE BOX
Patient is a 52y old  Male who presents with a chief complaint of right sided neck and face pain (01 Aug 2018 22:20)    penicillin (Hives; Rash)      INTERVAL HPI/OVERNIGHT EVENTS:    T(C): 36.8 (08-03-18 @ 05:08), Max: 36.8 (08-02-18 @ 14:18)  HR: 80 (08-03-18 @ 05:08) (80 - 86)  BP: 105/73 (08-03-18 @ 05:08) (105/73 - 129/76)  RR: 15 (08-03-18 @ 05:08) (15 - 19)  SpO2: 100% (08-03-18 @ 05:08) (97% - 100%)  I&O's Summary    Vital Signs Last 24 Hrs  T(C): 36.8 (03 Aug 2018 05:08), Max: 36.8 (02 Aug 2018 14:18)  T(F): 98.2 (03 Aug 2018 05:08), Max: 98.2 (02 Aug 2018 14:18)  HR: 80 (03 Aug 2018 05:08) (80 - 86)  BP: 105/73 (03 Aug 2018 05:08) (105/73 - 129/76)  BP(mean): --  RR: 15 (03 Aug 2018 05:08) (15 - 19)  SpO2: 100% (03 Aug 2018 05:08) (97% - 100%)  PAST MEDICAL & SURGICAL HISTORY:  Gastritis, Helicobacter pylori  Pancreatitis  Hernia          LABS:                        16.6   14.7  )-----------( 376      ( 03 Aug 2018 10:56 )             50.5     08-03    136  |  104  |  24<H>  ----------------------------<  301<H>  4.3   |  24  |  0.98    Ca    9.0      03 Aug 2018 10:56  Phos  3.4     08-02  Mg     2.5     08-02    TPro  8.4<H>  /  Alb  3.5  /  TBili  0.7  /  DBili  x   /  AST  20  /  ALT  35  /  AlkPhos  85  08-02      CAPILLARY BLOOD GLUCOSE                MEDICATIONS  (STANDING):  clindamycin IVPB 900 milliGRAM(s) IV Intermittent every 8 hours  dexamethasone  Injectable 4 milliGRAM(s) IV Push every 6 hours  sodium chloride 0.9% lock flush 3 milliLiter(s) IV Push every 8 hours  sodium chloride 0.9%. 1000 milliLiter(s) (75 mL/Hr) IV Continuous <Continuous>    MEDICATIONS  (PRN):  acetaminophen   Tablet 650 milliGRAM(s) Oral every 6 hours PRN For Temp greater than 38 C (100.4 F)  acetaminophen   Tablet. 650 milliGRAM(s) Oral every 6 hours PRN Severe Pain (7 - 10)      REVIEW OF SYSTEMS:  CONSTITUTIONAL: No fever, weight loss, or fatigue  EYES: No eye pain, visual disturbances, or discharge  ENMT:  mild rt ear pain and throat pain th  NECK: rt neck pain  RESPIRATORY: No cough, wheezing, chills or hemoptysis; No shortness of breath  CARDIOVASCULAR: No chest pain, palpitations, dizziness, or leg swelling  GASTROINTESTINAL: No abdominal or epigastric pain. No nausea, vomiting, or hematemesis; No diarrhea or constipation. No melena or hematochezia.  GENITOURINARY: No dysuria, frequency, hematuria, or incontinence  NEUROLOGICAL: No headaches, memory loss, loss of strength, numbness, or tremors  SKIN: No itching, burning, rashes, or lesions   LYMPH NODES: No submandibular gland enlargement  ENDOCRINE: No heat or cold intolerance; No hair loss  MUSCULOSKELETAL: No joint pain or swelling; No muscle, back, or extremity pain  PSYCHIATRIC: No depression, anxiety, mood swings, or difficulty sleeping  HEME/LYMPH: No easy bruising, or bleeding gums  ALLERGY AND IMMUNOLOGIC: No hives or eczema    RADIOLOGY & ADDITIONAL TESTS:      Consultant(s) Notes Reviewed:  [y ] YES  [ ] NO    PHYSICAL EXAM:  GENERAL: NAD, well-groomed, well-developed  HEAD:  Atraumatic, Normocephalic  EYES: EOMI, PERRLA, conjunctiva and sclera clear  ENMT: No tonsillar erythema, exudates, or enlargement; Moist mucous membranes, Good dentition, No lesions  NECK: Supple, No JVD, Normal thyroid  NERVOUS SYSTEM:  Alert & Oriented X3,  CHEST/LUNG: Good air movement bilaterally; No rales, rhonchi, wheezing, or rubs  HEART: S1, S2; No murmurs, rubs, or gallops  ABDOMEN: Soft, Nontender, Nondistended; Bowel sounds present  EXTREMITIES:  2+ Peripheral Pulses, No clubbing, cyanosis, or edema  LYMPH: No enlarged submandibular gland enlargement  SKIN: No rashes or lesions    Care Collaborated Discussed with Consultants/Other Providers [ ] YES  [ ] NO

## 2018-08-04 RX ADMIN — SODIUM CHLORIDE 3 MILLILITER(S): 9 INJECTION INTRAMUSCULAR; INTRAVENOUS; SUBCUTANEOUS at 13:04

## 2018-08-04 RX ADMIN — PANTOPRAZOLE SODIUM 40 MILLIGRAM(S): 20 TABLET, DELAYED RELEASE ORAL at 13:02

## 2018-08-04 RX ADMIN — Medication 100 MILLIGRAM(S): at 21:30

## 2018-08-04 RX ADMIN — SODIUM CHLORIDE 3 MILLILITER(S): 9 INJECTION INTRAMUSCULAR; INTRAVENOUS; SUBCUTANEOUS at 21:29

## 2018-08-04 RX ADMIN — Medication 100 MILLIGRAM(S): at 06:14

## 2018-08-04 RX ADMIN — SODIUM CHLORIDE 3 MILLILITER(S): 9 INJECTION INTRAMUSCULAR; INTRAVENOUS; SUBCUTANEOUS at 05:12

## 2018-08-04 RX ADMIN — Medication 100 MILLIGRAM(S): at 13:04

## 2018-08-04 RX ADMIN — Medication 4 MILLIGRAM(S): at 06:14

## 2018-08-04 NOTE — PROGRESS NOTE ADULT - SUBJECTIVE AND OBJECTIVE BOX
52y Male    Meds:  clindamycin IVPB 900 milliGRAM(s) IV Intermittent every 8 hours    Allergies    penicillin (Hives; Rash)    Intolerances        VITALS:  Vital Signs Last 24 Hrs  T(C): 36.8 (04 Aug 2018 14:00), Max: 36.9 (03 Aug 2018 21:39)  T(F): 98.2 (04 Aug 2018 14:00), Max: 98.4 (03 Aug 2018 21:39)  HR: 72 (04 Aug 2018 14:00) (69 - 72)  BP: 120/75 (04 Aug 2018 14:00) (117/81 - 132/70)  BP(mean): --  RR: 18 (04 Aug 2018 14:00) (14 - 18)  SpO2: 100% (04 Aug 2018 14:00) (98% - 100%)    LABS/DIAGNOSTIC TESTS:                          16.6   14.7  )-----------( 376      ( 03 Aug 2018 10:56 )             50.5         08-03    136  |  104  |  24<H>  ----------------------------<  301<H>  4.3   |  24  |  0.98    Ca    9.0      03 Aug 2018 10:56            CULTURES:       RADIOLOGY:      ROS:  [  ] UNABLE TO ELICIT 52y Male who is doing much better but is still having some right ear pain and right throat pain though much less than before. No fevers or chills, no diarrhea.    Meds:  clindamycin IVPB 900 milliGRAM(s) IV Intermittent every 8 hours    Allergies    penicillin (Hives; Rash)    Intolerances        VITALS:  Vital Signs Last 24 Hrs  T(C): 36.8 (04 Aug 2018 14:00), Max: 36.9 (03 Aug 2018 21:39)  T(F): 98.2 (04 Aug 2018 14:00), Max: 98.4 (03 Aug 2018 21:39)  HR: 72 (04 Aug 2018 14:00) (69 - 72)  BP: 120/75 (04 Aug 2018 14:00) (117/81 - 132/70)  BP(mean): --  RR: 18 (04 Aug 2018 14:00) (14 - 18)  SpO2: 100% (04 Aug 2018 14:00) (98% - 100%)    LABS/DIAGNOSTIC TESTS:                          16.6   14.7  )-----------( 376      ( 03 Aug 2018 10:56 )             50.5         08-03    136  |  104  |  24<H>  ----------------------------<  301<H>  4.3   |  24  |  0.98    Ca    9.0      03 Aug 2018 10:56            CULTURES:       RADIOLOGY:      ROS:  [  ] UNABLE TO ELICIT

## 2018-08-04 NOTE — PROGRESS NOTE ADULT - SUBJECTIVE AND OBJECTIVE BOX
Patient is a 52y old  Male who presents with a chief complaint of right sided neck and face pain (01 Aug 2018 22:20)    pt seen in icu [  ], reg med floor [  x ], bed [  x], chair at bedside [   ], a+o x3 [ x ], lethargic [  ],  nad [ x ]      Allergies    penicillin (Hives; Rash)        Vitals    T(F): 97.7 (08-04-18 @ 04:35), Max: 98.4 (08-03-18 @ 21:39)  HR: 69 (08-04-18 @ 04:35) (69 - 81)  BP: 117/81 (08-04-18 @ 04:35) (117/81 - 132/70)  RR: 16 (08-04-18 @ 04:35) (14 - 16)  SpO2: 99% (08-04-18 @ 04:35) (98% - 99%)  Wt(kg): --  CAPILLARY BLOOD GLUCOSE          Labs                          16.6   14.7  )-----------( 376      ( 03 Aug 2018 10:56 )             50.5       08-03    136  |  104  |  24<H>  ----------------------------<  301<H>  4.3   |  24  |  0.98    Ca    9.0      03 Aug 2018 10:56  Phos  3.4     08-02  Mg     2.5     08-02    TPro  8.4<H>  /  Alb  3.5  /  TBili  0.7  /  DBili  x   /  AST  20  /  ALT  35  /  AlkPhos  85  08-02                Radiology Results      Meds    MEDICATIONS  (STANDING):  clindamycin IVPB 900 milliGRAM(s) IV Intermittent every 8 hours  dexamethasone  Injectable 4 milliGRAM(s) IV Push every 6 hours  pantoprazole    Tablet 40 milliGRAM(s) Oral daily  sodium chloride 0.9% lock flush 3 milliLiter(s) IV Push every 8 hours  sodium chloride 0.9%. 1000 milliLiter(s) (75 mL/Hr) IV Continuous <Continuous>      MEDICATIONS  (PRN):  acetaminophen   Tablet 650 milliGRAM(s) Oral every 6 hours PRN For Temp greater than 38 C (100.4 F)  acetaminophen   Tablet. 650 milliGRAM(s) Oral every 6 hours PRN Severe Pain (7 - 10)      Physical Exam    HEENT : right tonsillar swelling much improved  Neuro :  no focal deficits  Respiratory: CTA B/L  CV: RRR, S1S2, no murmurs,   Abdominal: Soft, NT, ND +BS,  Extremities: No edema, + peripheral pulses    ASSESSMENT    Peritonsillar abscess  h/o Gastritis, Helicobacter pylori  Pancreatitis  Hernia  No significant past surgical history      PLAN    ent cons noted  s/p incision and drainage of Right PTA at bedside  f/u in ENT clinic in 1 week [phone]  cont clindamycin 900 mgs iv q8hrs  course of decadron 4mgs iv q6hrs completed  id f/u  tylnol prn pain  cont current meds  d/c plan pending id recs for po abx

## 2018-08-04 NOTE — PROGRESS NOTE ADULT - ASSESSMENT
right Tonsillar abscess/ tonsillitis - s/p I&D    plan - can dc home tomorrow on clindamycin 300mgs po q6hrs x 10 days  reconsult prn

## 2018-08-05 ENCOUNTER — TRANSCRIPTION ENCOUNTER (OUTPATIENT)
Age: 53
End: 2018-08-05

## 2018-08-05 VITALS
RESPIRATION RATE: 18 BRPM | SYSTOLIC BLOOD PRESSURE: 131 MMHG | HEART RATE: 86 BPM | DIASTOLIC BLOOD PRESSURE: 88 MMHG | TEMPERATURE: 98 F | OXYGEN SATURATION: 99 %

## 2018-08-05 PROCEDURE — 82607 VITAMIN B-12: CPT

## 2018-08-05 PROCEDURE — 85027 COMPLETE CBC AUTOMATED: CPT

## 2018-08-05 PROCEDURE — 80053 COMPREHEN METABOLIC PANEL: CPT

## 2018-08-05 PROCEDURE — 99285 EMERGENCY DEPT VISIT HI MDM: CPT | Mod: 25

## 2018-08-05 PROCEDURE — 83735 ASSAY OF MAGNESIUM: CPT

## 2018-08-05 PROCEDURE — 93005 ELECTROCARDIOGRAM TRACING: CPT

## 2018-08-05 PROCEDURE — 96375 TX/PRO/DX INJ NEW DRUG ADDON: CPT

## 2018-08-05 PROCEDURE — 70491 CT SOFT TISSUE NECK W/DYE: CPT

## 2018-08-05 PROCEDURE — 80048 BASIC METABOLIC PNL TOTAL CA: CPT

## 2018-08-05 PROCEDURE — 96374 THER/PROPH/DIAG INJ IV PUSH: CPT

## 2018-08-05 PROCEDURE — 84100 ASSAY OF PHOSPHORUS: CPT

## 2018-08-05 PROCEDURE — 83036 HEMOGLOBIN GLYCOSYLATED A1C: CPT

## 2018-08-05 RX ORDER — ACETAMINOPHEN 500 MG
2 TABLET ORAL
Qty: 0 | Refills: 0 | COMMUNITY
Start: 2018-08-05

## 2018-08-05 RX ADMIN — Medication 100 MILLIGRAM(S): at 06:00

## 2018-08-05 RX ADMIN — SODIUM CHLORIDE 3 MILLILITER(S): 9 INJECTION INTRAMUSCULAR; INTRAVENOUS; SUBCUTANEOUS at 05:59

## 2018-08-05 RX ADMIN — PANTOPRAZOLE SODIUM 40 MILLIGRAM(S): 20 TABLET, DELAYED RELEASE ORAL at 12:29

## 2018-08-05 NOTE — DISCHARGE NOTE ADULT - PROVIDER TOKENS
TOKEN:'[id number]',FREE:[LAST:[PRIMARY CARE],FIRST:[PHYSICIAN],PHONE:[(   )    -],FAX:[(   )    -],ADDRESS:[FOLLOW UP WITHIN 1-2 weeks with your PCP]],FREE:[LAST:[ENT],PHONE:[(   )    -],FAX:[(   )    -],ADDRESS:[ENT clinic tel [phone]]] TOKEN:'[id number]',FREE:[LAST:[PRIMARY CARE],FIRST:[PHYSICIAN],PHONE:[(   )    -],FAX:[(   )    -],ADDRESS:[FOLLOW UP WITHIN 1-2 weeks with your PCP]],FREE:[LAST:[ENT],PHONE:[(   )    -],FAX:[(   )    -],ADDRESS:[ENT clinic tel [phone]]],FREE:[LAST:[[facility]],FIRST:[[facility]],PHONE:[(   )    -],FAX:[(   )    -],ADDRESS:[PRIMARY CARE   Address: [address] [place] [address]  Phone: [phone]]]

## 2018-08-05 NOTE — DISCHARGE NOTE ADULT - ADDITIONAL INSTRUCTIONS
Follow up with ENT Ear Nose and Throat specialist within 5 days   For any worsening pain, throat swelling, fever, chills, headache, chest pain, shortness of breath or any new or concerning symptoms return to emergency room or call 911  Have a healthy, soft diet. Do not drink alcohol Follow up with ENT Ear Nose and Throat specialist within 5 days - ENT clinic in 1 week [phone]  For any worsening pain, throat swelling, fever, chills, headache, chest pain, shortness of breath or any new or concerning symptoms return to emergency room or call 911  Have a healthy, soft diet. Do not drink alcohol  Follow up with your Primary Care Physician within 1- 2weeks for further testing for Diabetes. Your A1c is 6.1

## 2018-08-05 NOTE — PROGRESS NOTE ADULT - SUBJECTIVE AND OBJECTIVE BOX
Patient is a 52y old  Male who presents with a chief complaint of right sided neck and face pain (01 Aug 2018 22:20)    pt seen in icu [  ], reg med floor [  x ], bed [  x], chair at bedside [   ], a+o x3 [ x ], lethargic [  ],  nad [ x ]        Allergies    penicillin (Hives; Rash)        Vitals    T(F): 98.4 (08-05-18 @ 04:35), Max: 98.4 (08-05-18 @ 04:35)  HR: 65 (08-05-18 @ 04:35) (65 - 72)  BP: 116/80 (08-05-18 @ 04:35) (116/80 - 120/75)  RR: 18 (08-05-18 @ 04:35) (18 - 18)  SpO2: 98% (08-05-18 @ 04:35) (98% - 100%)  Wt(kg): --  CAPILLARY BLOOD GLUCOSE          Labs                          16.6   14.7  )-----------( 376      ( 03 Aug 2018 10:56 )             50.5       08-03    136  |  104  |  24<H>  ----------------------------<  301<H>  4.3   |  24  |  0.98    Ca    9.0      03 Aug 2018 10:56                  Radiology Results      Meds    MEDICATIONS  (STANDING):  clindamycin IVPB 900 milliGRAM(s) IV Intermittent every 8 hours  pantoprazole    Tablet 40 milliGRAM(s) Oral daily  sodium chloride 0.9% lock flush 3 milliLiter(s) IV Push every 8 hours  sodium chloride 0.9%. 1000 milliLiter(s) (75 mL/Hr) IV Continuous <Continuous>      MEDICATIONS  (PRN):  acetaminophen   Tablet 650 milliGRAM(s) Oral every 6 hours PRN For Temp greater than 38 C (100.4 F)  acetaminophen   Tablet. 650 milliGRAM(s) Oral every 6 hours PRN Severe Pain (7 - 10)      Physical Exam    HEENT : right tonsillar swelling much improved  Neuro :  no focal deficits  Respiratory: CTA B/L  CV: RRR, S1S2, no murmurs,   Abdominal: Soft, NT, ND +BS,  Extremities: No edema, + peripheral pulses    ASSESSMENT    Peritonsillar abscess  h/o Gastritis, Helicobacter pylori  Pancreatitis  Hernia  No significant past surgical history      PLAN    ent cons noted  s/p incision and drainage of Right PTA at bedside  f/u in ENT clinic in 1 week [phone]  cont clindamycin 900 mgs iv q8hrs  course of decadron 4mgs iv q6hrs completed  id f/u  tylnol prn pain  cont current meds  d/c plan pending id recs for po abx Patient is a 52y old  Male who presents with a chief complaint of right sided neck and face pain (01 Aug 2018 22:20)    pt seen in icu [  ], reg med floor [  x ], bed [  x], chair at bedside [   ], a+o x3 [ x ], lethargic [  ],  nad [ x ]        Allergies    penicillin (Hives; Rash)        Vitals    T(F): 98.4 (08-05-18 @ 04:35), Max: 98.4 (08-05-18 @ 04:35)  HR: 65 (08-05-18 @ 04:35) (65 - 72)  BP: 116/80 (08-05-18 @ 04:35) (116/80 - 120/75)  RR: 18 (08-05-18 @ 04:35) (18 - 18)  SpO2: 98% (08-05-18 @ 04:35) (98% - 100%)  Wt(kg): --  CAPILLARY BLOOD GLUCOSE          Labs                          16.6   14.7  )-----------( 376      ( 03 Aug 2018 10:56 )             50.5       08-03    136  |  104  |  24<H>  ----------------------------<  301<H>  4.3   |  24  |  0.98    Ca    9.0      03 Aug 2018 10:56                  Radiology Results      Meds    MEDICATIONS  (STANDING):  clindamycin IVPB 900 milliGRAM(s) IV Intermittent every 8 hours  pantoprazole    Tablet 40 milliGRAM(s) Oral daily  sodium chloride 0.9% lock flush 3 milliLiter(s) IV Push every 8 hours  sodium chloride 0.9%. 1000 milliLiter(s) (75 mL/Hr) IV Continuous <Continuous>      MEDICATIONS  (PRN):  acetaminophen   Tablet 650 milliGRAM(s) Oral every 6 hours PRN For Temp greater than 38 C (100.4 F)  acetaminophen   Tablet. 650 milliGRAM(s) Oral every 6 hours PRN Severe Pain (7 - 10)      Physical Exam    HEENT : right tonsillar swelling much improved  Neuro :  no focal deficits  Respiratory: CTA B/L  CV: RRR, S1S2, no murmurs,   Abdominal: Soft, NT, ND +BS,  Extremities: No edema, + peripheral pulses    ASSESSMENT    Peritonsillar abscess  h/o Gastritis, Helicobacter pylori  Pancreatitis  Hernia  No significant past surgical history      PLAN    ent cons noted  s/p incision and drainage of Right PTA at bedside  f/u in ENT clinic in 1 week [phone]  change clindamycin to 300mg PO Q6hr x 10 days  course of decadron 4mgs iv q6hrs completed  id f/u  tylnol prn pain  cont current meds  pt. stable for d/c

## 2018-08-05 NOTE — DISCHARGE NOTE ADULT - INSTRUCTIONS
Follow up with ENT Dr Myers within 5d days   For any worsening any new or concerning symptoms return to Emergency Department   Do not drink alcohol   Have a heart healthy low salt and low cholesterol, soft diet Heart healthy, Low salt and cholesterol, soft consitency till resolution of throat pain Follow up with ENT Dr Myers within 5d days   Follow up with PCP in 1-2 weeks for hemoglobin A1 c - 6.1  For any worsening any new or concerning symptoms return to Emergency Department   Do not drink alcohol   Have a heart healthy low sugar, low salt and low cholesterol, soft diet Heart healthy, sugar free, low salt and cholesterol, soft consistency till resolution of throat pain  Follow up with ENT clinic in 1 week. Call tomorrow for appointment confirmation [phone]  Please follow up with your PCP for Diabetes testing in 1-2 weeks

## 2018-08-05 NOTE — PROGRESS NOTE ADULT - PROVIDER SPECIALTY LIST ADULT
Infectious Disease
Internal Medicine

## 2018-08-05 NOTE — DISCHARGE NOTE ADULT - PLAN OF CARE
resolution of infection Take your antibiotic as prescribed. Take Clindamycin capsule 300 mg every six hours for ten days.  Follow up with ear nose and throat specialist this week (within 7 days)  For any worsening pain, throat swelling, fever, chills, headache, chest pain, shortness of breath or any new or concerning symptoms return to emergency room or call 911  Have a healthy, soft diet. Do not drink alcohol resolution of symptoms, void of complications Have a healthy diet. Do not drink alcohol. Follow up with your primary physician within 1-2 weeks  For any new symptoms or any concerns return to Emergency Department or call 911 Take your antibiotic as prescribed. Take Clindamycin capsule 300 mg every six hours for ten days.  Follow up with ear nose and throat specialist this week within 5 days -call ENT clinic [phone]   For any worsening pain, throat swelling, fever, chills, headache, chest pain, shortness of breath or any new or concerning symptoms return to emergency room or call 911  Have a healthy, soft diet. Do not drink alcohol

## 2018-08-05 NOTE — DISCHARGE NOTE ADULT - MEDICATION SUMMARY - MEDICATIONS TO TAKE
I will START or STAY ON the medications listed below when I get home from the hospital:    Tylenol 325 mg oral tablet  -- 2 tab(s) by mouth every 6 hours, As needed, Severe Pain (7 - 10)  -- Indication: For Throat pain in adult    clindamycin 300 mg oral capsule  -- 1 cap(s) by mouth every 6 hours - for infection   -- Finish all this medication unless otherwise directed by prescriber.  Medication should be taken with plenty of water.    -- Indication: For Peritonsillar abscess    pantoprazole 40 mg oral delayed release tablet  -- 1 tab(s) by mouth once a day  -- Indication: For Gastritis

## 2018-08-05 NOTE — DISCHARGE NOTE ADULT - PATIENT PORTAL LINK FT
You can access the [facility] Patient Portal, offered by [facility], by registering with the following website: [email or web address]

## 2018-08-05 NOTE — DISCHARGE NOTE ADULT - HOSPITAL COURSE
Patient is a 51 y/o M from Home with PMH of gastritis presented to ED with complaint of  dysphagia, right sided facial pain, ear pain and neck pain. Patient complaint of severe neck pain and face pain started 2 days ago progressively worsening.  Had difficulties swallowing solid food.   In ED patient's vitals were stable, wbc count of 12K. CT neck was performed which showed a natalie-tonsillar abscess on right side and Sub mandibular gland inflammation.   Patient received 1 dose of clindamycin in ED and Decadron. Patient was protecting his airway. ENT was consulted by ED provider.   Patient admitted for management of peritonsillar abscess         Allergies:  	penicillin: Drug, Hives, Rash      Peritonsillar abscess    CT neck positive for right sided peritonsillar abscess  ENT Dr Myers consulted with abscess draining   IV antibiotics Clindamycin 900 mg Iv Q8 hrs as patient is allergic to penicillin  Blood cultures remained negative  Decadron, acetaminophen and cepacol given for symptoms control  IV fluids for hydration.  ID Dr Salcido consulted for antibiotics choice       Gastritis  continued Home dose of protonix 40 daily  education alcohol abstinence provided Patient is a 51 y/o M from Home with PMH of gastritis presented to ED with complaint of  dysphagia, right sided facial pain, ear pain and neck pain. Patient complaint of severe neck pain and face pain started 2 days ago progressively worsening.  Had difficulties swallowing solid food.   In ED patient's vitals were stable, wbc count of 12K. CT neck was performed which showed a natalie-tonsillar abscess on right side and Sub mandibular gland inflammation.   Patient received 1 dose of clindamycin in ED and Decadron. Patient was protecting his airway. ENT was consulted by ED provider.   Patient admitted for management of peritonsillar abscess         Allergies:  	penicillin: Drug, Hives, Rash      Peritonsillar abscess    CT neck positive for right sided peritonsillar abscess  ENT Dr Myers consulted with abscess draining   IV antibiotics started Clindamycin 900 mg Iv Q8 hrs as patient is allergic to penicillin  Decadron, acetaminophen and cepacol given for symptoms control  IV fluids for hydration given  ID Dr Salcido consulted for antibiotics choice       Gastritis  continued Home dose of protonix 40 daily  education alcohol abstinence provided     A1 c 6.1  Recommended to follow up with PCP in 1-2 weeks for further testing and management       Case with discharge plans discussed with attending Dr Barreto

## 2018-08-05 NOTE — DISCHARGE NOTE ADULT - CARE PLAN
Principal Discharge DX:	Peritonsillar abscess  Goal:	resolution of infection  Assessment and plan of treatment:	Take your antibiotic as prescribed. Take Clindamycin capsule 300 mg every six hours for ten days.  Follow up with ear nose and throat specialist this week (within 7 days)  For any worsening pain, throat swelling, fever, chills, headache, chest pain, shortness of breath or any new or concerning symptoms return to emergency room or call 911  Have a healthy, soft diet. Do not drink alcohol  Secondary Diagnosis:	Gastritis  Goal:	resolution of symptoms, void of complications  Assessment and plan of treatment:	Have a healthy diet. Do not drink alcohol. Follow up with your primary physician within 1-2 weeks  For any new symptoms or any concerns return to Emergency Department or call 911 Principal Discharge DX:	Peritonsillar abscess  Goal:	resolution of infection  Assessment and plan of treatment:	Take your antibiotic as prescribed. Take Clindamycin capsule 300 mg every six hours for ten days.  Follow up with ear nose and throat specialist this week within 5 days -call ENT clinic [phone]   For any worsening pain, throat swelling, fever, chills, headache, chest pain, shortness of breath or any new or concerning symptoms return to emergency room or call 911  Have a healthy, soft diet. Do not drink alcohol  Secondary Diagnosis:	Gastritis  Goal:	resolution of symptoms, void of complications  Assessment and plan of treatment:	Have a healthy diet. Do not drink alcohol. Follow up with your primary physician within 1-2 weeks  For any new symptoms or any concerns return to Emergency Department or call 911

## 2018-08-05 NOTE — DISCHARGE NOTE ADULT - CARE PROVIDER_API CALL
Santi Myers), Otolaryngology  [address] [place] [address]  Phone: [phone]  Fax: [phone]    PRIMARY CARE, PHYSICIAN  FOLLOW UP WITHIN 1-2 weeks with your PCP  Phone: (   )    -  Fax: (   )    -    ENT,   ENT clinic tel [phone]  Phone: (   )    -  Fax: (   )    - Santi Myers), Otolaryngology  [address] [address]  Phone: [phone]  Fax: [phone]    PRIMARY CARE, PHYSICIAN  FOLLOW UP WITHIN 1-2 weeks with your PCP  Phone: (   )    -  Fax: (   )    -    ENT,   ENT clinic tel [phone]  Phone: (   )    -  Fax: (   )    -    [facility]  PRIMARY CARE   Address: [address] [place] [address]  Phone: [phone]  Phone: (   )    -  Fax: (   )    -

## 2020-06-03 NOTE — PATIENT PROFILE ADULT. - NS TRANSFER RESPONSE BELONGING
Detail Level: Zone Duration Of Freeze Thaw-Cycle (Seconds): 0 Consent: The patient's consent was obtained including but not limited to risks of crusting, scabbing, blistering, scarring, darker or lighter pigmentary change, recurrence, incomplete removal and infection. Render Note In Bullet Format When Appropriate: No Post-Care Instructions: I reviewed with the patient in detail post-care instructions. Patient is to wear sunprotection, and avoid picking at any of the treated lesions. Pt may apply Vaseline to crusted or scabbing areas. yes

## 2020-12-08 ENCOUNTER — EMERGENCY (EMERGENCY)
Facility: HOSPITAL | Age: 55
LOS: 1 days | Discharge: HOME | End: 2020-12-08
Attending: STUDENT IN AN ORGANIZED HEALTH CARE EDUCATION/TRAINING PROGRAM
Payer: MEDICAID

## 2020-12-08 VITALS
RESPIRATION RATE: 20 BRPM | HEART RATE: 110 BPM | DIASTOLIC BLOOD PRESSURE: 95 MMHG | OXYGEN SATURATION: 98 % | TEMPERATURE: 99 F | HEIGHT: 64 IN | WEIGHT: 147.93 LBS | SYSTOLIC BLOOD PRESSURE: 142 MMHG

## 2020-12-08 VITALS
HEART RATE: 72 BPM | OXYGEN SATURATION: 99 % | SYSTOLIC BLOOD PRESSURE: 110 MMHG | DIASTOLIC BLOOD PRESSURE: 74 MMHG | TEMPERATURE: 98 F | RESPIRATION RATE: 20 BRPM

## 2020-12-08 DIAGNOSIS — K46.9 UNSPECIFIED ABDOMINAL HERNIA WITHOUT OBSTRUCTION OR GANGRENE: Chronic | ICD-10-CM

## 2020-12-08 LAB
ALBUMIN SERPL ELPH-MCNC: 3.7 G/DL — SIGNIFICANT CHANGE UP (ref 3.5–5)
ALP SERPL-CCNC: 85 U/L — SIGNIFICANT CHANGE UP (ref 40–120)
ALT FLD-CCNC: 31 U/L DA — SIGNIFICANT CHANGE UP (ref 10–60)
ANION GAP SERPL CALC-SCNC: 10 MMOL/L — SIGNIFICANT CHANGE UP (ref 5–17)
APTT BLD: 31.6 SEC — SIGNIFICANT CHANGE UP (ref 27.5–35.5)
AST SERPL-CCNC: 20 U/L — SIGNIFICANT CHANGE UP (ref 10–40)
BASOPHILS # BLD AUTO: 0.02 K/UL — SIGNIFICANT CHANGE UP (ref 0–0.2)
BASOPHILS NFR BLD AUTO: 0.4 % — SIGNIFICANT CHANGE UP (ref 0–2)
BILIRUB SERPL-MCNC: 0.5 MG/DL — SIGNIFICANT CHANGE UP (ref 0.2–1.2)
BUN SERPL-MCNC: 21 MG/DL — HIGH (ref 7–18)
CALCIUM SERPL-MCNC: 9.1 MG/DL — SIGNIFICANT CHANGE UP (ref 8.4–10.5)
CHLORIDE SERPL-SCNC: 107 MMOL/L — SIGNIFICANT CHANGE UP (ref 96–108)
CO2 SERPL-SCNC: 23 MMOL/L — SIGNIFICANT CHANGE UP (ref 22–31)
CREAT SERPL-MCNC: 1.01 MG/DL — SIGNIFICANT CHANGE UP (ref 0.5–1.3)
EOSINOPHIL # BLD AUTO: 0.09 K/UL — SIGNIFICANT CHANGE UP (ref 0–0.5)
EOSINOPHIL NFR BLD AUTO: 2 % — SIGNIFICANT CHANGE UP (ref 0–6)
GLUCOSE SERPL-MCNC: 161 MG/DL — HIGH (ref 70–99)
HCT VFR BLD CALC: 50.3 % — HIGH (ref 39–50)
HGB BLD-MCNC: 16.7 G/DL — SIGNIFICANT CHANGE UP (ref 13–17)
HIV 1 & 2 AB SERPL IA.RAPID: SIGNIFICANT CHANGE UP
IMM GRANULOCYTES NFR BLD AUTO: 0.7 % — SIGNIFICANT CHANGE UP (ref 0–1.5)
INR BLD: 0.99 RATIO — SIGNIFICANT CHANGE UP (ref 0.88–1.16)
LIDOCAIN IGE QN: 151 U/L — SIGNIFICANT CHANGE UP (ref 73–393)
LYMPHOCYTES # BLD AUTO: 1.49 K/UL — SIGNIFICANT CHANGE UP (ref 1–3.3)
LYMPHOCYTES # BLD AUTO: 32.3 % — SIGNIFICANT CHANGE UP (ref 13–44)
MAGNESIUM SERPL-MCNC: 2 MG/DL — SIGNIFICANT CHANGE UP (ref 1.6–2.6)
MCHC RBC-ENTMCNC: 31.6 PG — SIGNIFICANT CHANGE UP (ref 27–34)
MCHC RBC-ENTMCNC: 33.2 GM/DL — SIGNIFICANT CHANGE UP (ref 32–36)
MCV RBC AUTO: 95.3 FL — SIGNIFICANT CHANGE UP (ref 80–100)
MONOCYTES # BLD AUTO: 0.45 K/UL — SIGNIFICANT CHANGE UP (ref 0–0.9)
MONOCYTES NFR BLD AUTO: 9.8 % — SIGNIFICANT CHANGE UP (ref 2–14)
NEUTROPHILS # BLD AUTO: 2.53 K/UL — SIGNIFICANT CHANGE UP (ref 1.8–7.4)
NEUTROPHILS NFR BLD AUTO: 54.8 % — SIGNIFICANT CHANGE UP (ref 43–77)
NRBC # BLD: 0 /100 WBCS — SIGNIFICANT CHANGE UP (ref 0–0)
NT-PROBNP SERPL-SCNC: 10 PG/ML — SIGNIFICANT CHANGE UP (ref 0–125)
PLATELET # BLD AUTO: 262 K/UL — SIGNIFICANT CHANGE UP (ref 150–400)
POTASSIUM SERPL-MCNC: 4 MMOL/L — SIGNIFICANT CHANGE UP (ref 3.5–5.3)
POTASSIUM SERPL-SCNC: 4 MMOL/L — SIGNIFICANT CHANGE UP (ref 3.5–5.3)
PROT SERPL-MCNC: 7.8 G/DL — SIGNIFICANT CHANGE UP (ref 6–8.3)
PROTHROM AB SERPL-ACNC: 11.8 SEC — SIGNIFICANT CHANGE UP (ref 10.6–13.6)
RBC # BLD: 5.28 M/UL — SIGNIFICANT CHANGE UP (ref 4.2–5.8)
RBC # FLD: 12.6 % — SIGNIFICANT CHANGE UP (ref 10.3–14.5)
SARS-COV-2 RNA SPEC QL NAA+PROBE: SIGNIFICANT CHANGE UP
SODIUM SERPL-SCNC: 140 MMOL/L — SIGNIFICANT CHANGE UP (ref 135–145)
TROPONIN I SERPL-MCNC: <0.015 NG/ML — SIGNIFICANT CHANGE UP (ref 0–0.04)
TROPONIN I SERPL-MCNC: <0.015 NG/ML — SIGNIFICANT CHANGE UP (ref 0–0.04)
WBC # BLD: 4.61 K/UL — SIGNIFICANT CHANGE UP (ref 3.8–10.5)
WBC # FLD AUTO: 4.61 K/UL — SIGNIFICANT CHANGE UP (ref 3.8–10.5)

## 2020-12-08 PROCEDURE — 70498 CT ANGIOGRAPHY NECK: CPT

## 2020-12-08 PROCEDURE — 83690 ASSAY OF LIPASE: CPT

## 2020-12-08 PROCEDURE — 86703 HIV-1/HIV-2 1 RESULT ANTBDY: CPT

## 2020-12-08 PROCEDURE — 70498 CT ANGIOGRAPHY NECK: CPT | Mod: 26

## 2020-12-08 PROCEDURE — 36415 COLL VENOUS BLD VENIPUNCTURE: CPT

## 2020-12-08 PROCEDURE — 83880 ASSAY OF NATRIURETIC PEPTIDE: CPT

## 2020-12-08 PROCEDURE — 83735 ASSAY OF MAGNESIUM: CPT

## 2020-12-08 PROCEDURE — 96361 HYDRATE IV INFUSION ADD-ON: CPT

## 2020-12-08 PROCEDURE — 71046 X-RAY EXAM CHEST 2 VIEWS: CPT | Mod: 26

## 2020-12-08 PROCEDURE — 71046 X-RAY EXAM CHEST 2 VIEWS: CPT

## 2020-12-08 PROCEDURE — 85730 THROMBOPLASTIN TIME PARTIAL: CPT

## 2020-12-08 PROCEDURE — 93005 ELECTROCARDIOGRAM TRACING: CPT

## 2020-12-08 PROCEDURE — 84484 ASSAY OF TROPONIN QUANT: CPT

## 2020-12-08 PROCEDURE — 71275 CT ANGIOGRAPHY CHEST: CPT

## 2020-12-08 PROCEDURE — 99284 EMERGENCY DEPT VISIT MOD MDM: CPT | Mod: 25

## 2020-12-08 PROCEDURE — 71275 CT ANGIOGRAPHY CHEST: CPT | Mod: 26

## 2020-12-08 PROCEDURE — 85025 COMPLETE CBC W/AUTO DIFF WBC: CPT

## 2020-12-08 PROCEDURE — 85610 PROTHROMBIN TIME: CPT

## 2020-12-08 PROCEDURE — 87635 SARS-COV-2 COVID-19 AMP PRB: CPT

## 2020-12-08 PROCEDURE — 99285 EMERGENCY DEPT VISIT HI MDM: CPT

## 2020-12-08 PROCEDURE — 96374 THER/PROPH/DIAG INJ IV PUSH: CPT | Mod: XU

## 2020-12-08 PROCEDURE — 80053 COMPREHEN METABOLIC PANEL: CPT

## 2020-12-08 RX ORDER — SODIUM CHLORIDE 9 MG/ML
1000 INJECTION INTRAMUSCULAR; INTRAVENOUS; SUBCUTANEOUS ONCE
Refills: 0 | Status: COMPLETED | OUTPATIENT
Start: 2020-12-08 | End: 2020-12-08

## 2020-12-08 RX ORDER — ASPIRIN/CALCIUM CARB/MAGNESIUM 324 MG
162 TABLET ORAL ONCE
Refills: 0 | Status: COMPLETED | OUTPATIENT
Start: 2020-12-08 | End: 2020-12-08

## 2020-12-08 RX ORDER — ACETAMINOPHEN 500 MG
975 TABLET ORAL ONCE
Refills: 0 | Status: COMPLETED | OUTPATIENT
Start: 2020-12-08 | End: 2020-12-08

## 2020-12-08 RX ORDER — FAMOTIDINE 10 MG/ML
20 INJECTION INTRAVENOUS ONCE
Refills: 0 | Status: COMPLETED | OUTPATIENT
Start: 2020-12-08 | End: 2020-12-08

## 2020-12-08 RX ADMIN — SODIUM CHLORIDE 1000 MILLILITER(S): 9 INJECTION INTRAMUSCULAR; INTRAVENOUS; SUBCUTANEOUS at 09:06

## 2020-12-08 RX ADMIN — Medication 975 MILLIGRAM(S): at 10:39

## 2020-12-08 RX ADMIN — FAMOTIDINE 20 MILLIGRAM(S): 10 INJECTION INTRAVENOUS at 08:41

## 2020-12-08 RX ADMIN — SODIUM CHLORIDE 1000 MILLILITER(S): 9 INJECTION INTRAMUSCULAR; INTRAVENOUS; SUBCUTANEOUS at 10:06

## 2020-12-08 RX ADMIN — Medication 30 MILLILITER(S): at 08:41

## 2020-12-08 RX ADMIN — Medication 162 MILLIGRAM(S): at 08:41

## 2020-12-08 RX ADMIN — Medication 975 MILLIGRAM(S): at 10:09

## 2020-12-08 NOTE — ED PROVIDER NOTE - PROGRESS NOTE DETAILS
patient updated on results. still with chest and neck pain. will get dissection study. Gen Daugherty patient reports symptoms improved. will discharge. Gen Daugherty

## 2020-12-08 NOTE — ED PROVIDER NOTE - PATIENT PORTAL LINK FT
You can access the FollowMyHealth Patient Portal offered by [facility] by registering at the following website: [email or web address]. By joining [email or web address] FollowMyHealth portal, you will also be able to view your health information using other applications (apps) compatible with our system.

## 2020-12-08 NOTE — ED ADULT NURSE NOTE - OBJECTIVE STATEMENT
Pt states has intermittent chest pain x 3 days , but today the pain is a lot stronger, radiating to upper back and left neck. States has nausea at times, shortness of breath when ambulating  Denies fever, cough, body aches, vomiting or diarrhea

## 2020-12-08 NOTE — ED PROVIDER NOTE - CLINICAL SUMMARY MEDICAL DECISION MAKING FREE TEXT BOX
54M presenting with three days of chest pain. currently has chest pain. pain radiating to the neck and back. has headache but no sudden onset. normal blood pressure so low concern for dissection. concern for ACS, pancreatitis, gerd. plan for labs, xray, ekg. will reassess.

## 2020-12-08 NOTE — ED PROVIDER NOTE - NSFOLLOWUPINSTRUCTIONS_ED_ALL_ED_FT
Lo vieron en el departamento de emergencias por dolor en el pecho.    Sepideh un seguimiento con larson médico de atención primaria en las próximas 24 a 48 horas.    Si tiene algún síntoma que empeora, dolor severo en el pecho, dificultad para respirar, dolor de kyle, regrese al departamento de emergencias.    Translation via Google Translate. Cannot guarantee accuracy.     You were seen in the emergency department for chest pain.   Please follow-up with your primary care doctor in the next 24-48 hours.   If you have any worsening symptoms, severe chest pain, shortness of breath, headache, please return to the emergency department.

## 2020-12-08 NOTE — ED PROVIDER NOTE - OBJECTIVE STATEMENT
54M, pmh of asthma, gerd, presenting with chest pain. patient reports three days of intermittent chest pain that was worse this morning. does not know what makes the pain better or worse. currently has pain. pain radiates to his back and feels a pulse in his left neck. has had similar symptoms in the past but not typically as severe. also reports headache, nausea and shortness of breath. no fever, cough, abdominal pain, pain or burning with urination or rash. no known sick contacts. uses cpap at night for cpap.

## 2020-12-13 ENCOUNTER — EMERGENCY (EMERGENCY)
Facility: HOSPITAL | Age: 55
LOS: 1 days | Discharge: HOME | End: 2020-12-13
Attending: EMERGENCY MEDICINE
Payer: MEDICAID

## 2020-12-13 VITALS
DIASTOLIC BLOOD PRESSURE: 85 MMHG | WEIGHT: 154.1 LBS | TEMPERATURE: 98 F | HEART RATE: 80 BPM | SYSTOLIC BLOOD PRESSURE: 143 MMHG | HEIGHT: 64 IN | OXYGEN SATURATION: 96 % | RESPIRATION RATE: 20 BRPM

## 2020-12-13 DIAGNOSIS — K46.9 UNSPECIFIED ABDOMINAL HERNIA WITHOUT OBSTRUCTION OR GANGRENE: Chronic | ICD-10-CM

## 2020-12-13 LAB
ALBUMIN SERPL ELPH-MCNC: 3.8 G/DL — SIGNIFICANT CHANGE UP (ref 3.5–5)
ALP SERPL-CCNC: 88 U/L — SIGNIFICANT CHANGE UP (ref 40–120)
ALT FLD-CCNC: 32 U/L DA — SIGNIFICANT CHANGE UP (ref 10–60)
ANION GAP SERPL CALC-SCNC: 7 MMOL/L — SIGNIFICANT CHANGE UP (ref 5–17)
AST SERPL-CCNC: 23 U/L — SIGNIFICANT CHANGE UP (ref 10–40)
BASOPHILS # BLD AUTO: 0.04 K/UL — SIGNIFICANT CHANGE UP (ref 0–0.2)
BASOPHILS NFR BLD AUTO: 0.7 % — SIGNIFICANT CHANGE UP (ref 0–2)
BILIRUB SERPL-MCNC: 0.4 MG/DL — SIGNIFICANT CHANGE UP (ref 0.2–1.2)
BUN SERPL-MCNC: 22 MG/DL — HIGH (ref 7–18)
CALCIUM SERPL-MCNC: 9 MG/DL — SIGNIFICANT CHANGE UP (ref 8.4–10.5)
CHLORIDE SERPL-SCNC: 108 MMOL/L — SIGNIFICANT CHANGE UP (ref 96–108)
CO2 SERPL-SCNC: 25 MMOL/L — SIGNIFICANT CHANGE UP (ref 22–31)
CREAT SERPL-MCNC: 0.91 MG/DL — SIGNIFICANT CHANGE UP (ref 0.5–1.3)
EOSINOPHIL # BLD AUTO: 0.1 K/UL — SIGNIFICANT CHANGE UP (ref 0–0.5)
EOSINOPHIL NFR BLD AUTO: 1.8 % — SIGNIFICANT CHANGE UP (ref 0–6)
GLUCOSE SERPL-MCNC: 102 MG/DL — HIGH (ref 70–99)
HCT VFR BLD CALC: 48 % — SIGNIFICANT CHANGE UP (ref 39–50)
HGB BLD-MCNC: 15.9 G/DL — SIGNIFICANT CHANGE UP (ref 13–17)
IMM GRANULOCYTES NFR BLD AUTO: 0.7 % — SIGNIFICANT CHANGE UP (ref 0–1.5)
LYMPHOCYTES # BLD AUTO: 1.49 K/UL — SIGNIFICANT CHANGE UP (ref 1–3.3)
LYMPHOCYTES # BLD AUTO: 27.4 % — SIGNIFICANT CHANGE UP (ref 13–44)
MCHC RBC-ENTMCNC: 31.7 PG — SIGNIFICANT CHANGE UP (ref 27–34)
MCHC RBC-ENTMCNC: 33.1 GM/DL — SIGNIFICANT CHANGE UP (ref 32–36)
MCV RBC AUTO: 95.6 FL — SIGNIFICANT CHANGE UP (ref 80–100)
MONOCYTES # BLD AUTO: 0.57 K/UL — SIGNIFICANT CHANGE UP (ref 0–0.9)
MONOCYTES NFR BLD AUTO: 10.5 % — SIGNIFICANT CHANGE UP (ref 2–14)
NEUTROPHILS # BLD AUTO: 3.19 K/UL — SIGNIFICANT CHANGE UP (ref 1.8–7.4)
NEUTROPHILS NFR BLD AUTO: 58.9 % — SIGNIFICANT CHANGE UP (ref 43–77)
NRBC # BLD: 0 /100 WBCS — SIGNIFICANT CHANGE UP (ref 0–0)
PLATELET # BLD AUTO: 255 K/UL — SIGNIFICANT CHANGE UP (ref 150–400)
POTASSIUM SERPL-MCNC: 4.3 MMOL/L — SIGNIFICANT CHANGE UP (ref 3.5–5.3)
POTASSIUM SERPL-SCNC: 4.3 MMOL/L — SIGNIFICANT CHANGE UP (ref 3.5–5.3)
PROT SERPL-MCNC: 7.5 G/DL — SIGNIFICANT CHANGE UP (ref 6–8.3)
RBC # BLD: 5.02 M/UL — SIGNIFICANT CHANGE UP (ref 4.2–5.8)
RBC # FLD: 12.5 % — SIGNIFICANT CHANGE UP (ref 10.3–14.5)
SODIUM SERPL-SCNC: 140 MMOL/L — SIGNIFICANT CHANGE UP (ref 135–145)
TROPONIN I SERPL-MCNC: <0.015 NG/ML — SIGNIFICANT CHANGE UP (ref 0–0.04)
WBC # BLD: 5.43 K/UL — SIGNIFICANT CHANGE UP (ref 3.8–10.5)
WBC # FLD AUTO: 5.43 K/UL — SIGNIFICANT CHANGE UP (ref 3.8–10.5)

## 2020-12-13 PROCEDURE — 36415 COLL VENOUS BLD VENIPUNCTURE: CPT

## 2020-12-13 PROCEDURE — 99285 EMERGENCY DEPT VISIT HI MDM: CPT

## 2020-12-13 PROCEDURE — 93010 ELECTROCARDIOGRAM REPORT: CPT

## 2020-12-13 PROCEDURE — 80053 COMPREHEN METABOLIC PANEL: CPT

## 2020-12-13 PROCEDURE — 85025 COMPLETE CBC W/AUTO DIFF WBC: CPT

## 2020-12-13 PROCEDURE — 84484 ASSAY OF TROPONIN QUANT: CPT

## 2020-12-13 PROCEDURE — 93005 ELECTROCARDIOGRAM TRACING: CPT

## 2020-12-13 PROCEDURE — 99283 EMERGENCY DEPT VISIT LOW MDM: CPT

## 2020-12-13 NOTE — ED PROVIDER NOTE - PATIENT PORTAL LINK FT
You can access the FollowMyHealth Patient Portal offered by [facility] by registering at the following website: [email or web address]. By joining [employer]’s FollowMyHealth portal, you will also be able to view your health information using other applications (apps) compatible with our system.

## 2020-12-13 NOTE — ED PROVIDER NOTE - OBJECTIVE STATEMENT
55 y/o M pt with a PMHx of Asthma, GERD, Gastritis, Pancreatitis and a Hernia and no significant PSHx sent in by PCP to ED for Potassium level of 6.5 from labwork yesterday. Pt was previously here on 12/8 for chest pain and had an extensive workup with no acute findings. Pt was told to f/u with his PCP which he did this past Friday followed by subsequent bloodwork on Saturday. Pt relates today he had intermittent episode of palpitations as well as chest discomfort <1 minute x4 episodes while sitting. Pt is currently pending appointment with GI for persistent LUQ pain. Pt also notes more mucous in urine. Pt denies nausea, vomiting, SOB, significant weight loss, leg swelling, or any other acute complaints. Allergies: Penicillin (rash)

## 2020-12-13 NOTE — ED PROVIDER NOTE - PMH
Asthma    Gastritis, Helicobacter pylori    GERD (gastroesophageal reflux disease)    Pancreatitis

## 2020-12-13 NOTE — ED PROVIDER NOTE - CLINICAL SUMMARY MEDICAL DECISION MAKING FREE TEXT BOX
Elevated Potassium likely lab error. Will repeat blood work as couple of days ago pt had nml creatinine and nml potassium. Unlikely to have acute spike without changes in meds or without having abnormal labs. If workup negative, can be d/c home. Labs, EKG, and reassess.

## 2020-12-17 NOTE — ED ADULT TRIAGE NOTE - NS_BHTRGSOURCE_ED_A_ED_FT
Catrachito Sawant is a 37 year old patient who comes in today for a Blood Pressure check.  Initial BP:  BP (!) 132/92 (BP Location: Right arm, Patient Position: Chair, Cuff Size: Adult Regular)   Pulse 72      72  Disposition: follow-up as previously indicated by provider and provider notified while patient in the clinic    Per Dr. Juares watch caffeine and salt intake    Pati Ceballos MA 12/17/2020  3:50 PM          
[place]

## 2021-06-07 ENCOUNTER — EMERGENCY (EMERGENCY)
Facility: HOSPITAL | Age: 56
LOS: 1 days | Discharge: HOME | End: 2021-06-07
Attending: EMERGENCY MEDICINE
Payer: MEDICAID

## 2021-06-07 VITALS
DIASTOLIC BLOOD PRESSURE: 83 MMHG | HEART RATE: 89 BPM | WEIGHT: 160.06 LBS | OXYGEN SATURATION: 98 % | HEIGHT: 64 IN | TEMPERATURE: 98 F | SYSTOLIC BLOOD PRESSURE: 124 MMHG | RESPIRATION RATE: 17 BRPM

## 2021-06-07 DIAGNOSIS — K46.9 UNSPECIFIED ABDOMINAL HERNIA WITHOUT OBSTRUCTION OR GANGRENE: Chronic | ICD-10-CM

## 2021-06-07 PROBLEM — J45.909 UNSPECIFIED ASTHMA, UNCOMPLICATED: Chronic | Status: ACTIVE | Noted: 2020-12-13

## 2021-06-07 PROBLEM — K21.9 GASTRO-ESOPHAGEAL REFLUX DISEASE WITHOUT ESOPHAGITIS: Chronic | Status: ACTIVE | Noted: 2020-12-13

## 2021-06-07 LAB
ALBUMIN SERPL ELPH-MCNC: 3.6 G/DL — SIGNIFICANT CHANGE UP (ref 3.5–5)
ALP SERPL-CCNC: 82 U/L — SIGNIFICANT CHANGE UP (ref 40–120)
ALT FLD-CCNC: 44 U/L DA — SIGNIFICANT CHANGE UP (ref 10–60)
ANION GAP SERPL CALC-SCNC: 11 MMOL/L — SIGNIFICANT CHANGE UP (ref 5–17)
AST SERPL-CCNC: 21 U/L — SIGNIFICANT CHANGE UP (ref 10–40)
BASOPHILS # BLD AUTO: 0.02 K/UL — SIGNIFICANT CHANGE UP (ref 0–0.2)
BASOPHILS NFR BLD AUTO: 0.4 % — SIGNIFICANT CHANGE UP (ref 0–2)
BILIRUB SERPL-MCNC: 0.4 MG/DL — SIGNIFICANT CHANGE UP (ref 0.2–1.2)
BUN SERPL-MCNC: 18 MG/DL — SIGNIFICANT CHANGE UP (ref 7–18)
CALCIUM SERPL-MCNC: 9 MG/DL — SIGNIFICANT CHANGE UP (ref 8.4–10.5)
CHLORIDE SERPL-SCNC: 107 MMOL/L — SIGNIFICANT CHANGE UP (ref 96–108)
CO2 SERPL-SCNC: 21 MMOL/L — LOW (ref 22–31)
CREAT SERPL-MCNC: 0.88 MG/DL — SIGNIFICANT CHANGE UP (ref 0.5–1.3)
EOSINOPHIL # BLD AUTO: 0.07 K/UL — SIGNIFICANT CHANGE UP (ref 0–0.5)
EOSINOPHIL NFR BLD AUTO: 1.6 % — SIGNIFICANT CHANGE UP (ref 0–6)
GLUCOSE SERPL-MCNC: 188 MG/DL — HIGH (ref 70–99)
HCT VFR BLD CALC: 46.9 % — SIGNIFICANT CHANGE UP (ref 39–50)
HGB BLD-MCNC: 15.9 G/DL — SIGNIFICANT CHANGE UP (ref 13–17)
IMM GRANULOCYTES NFR BLD AUTO: 0.7 % — SIGNIFICANT CHANGE UP (ref 0–1.5)
LIDOCAIN IGE QN: 142 U/L — SIGNIFICANT CHANGE UP (ref 73–393)
LYMPHOCYTES # BLD AUTO: 1.21 K/UL — SIGNIFICANT CHANGE UP (ref 1–3.3)
LYMPHOCYTES # BLD AUTO: 26.9 % — SIGNIFICANT CHANGE UP (ref 13–44)
MCHC RBC-ENTMCNC: 31.7 PG — SIGNIFICANT CHANGE UP (ref 27–34)
MCHC RBC-ENTMCNC: 33.9 GM/DL — SIGNIFICANT CHANGE UP (ref 32–36)
MCV RBC AUTO: 93.6 FL — SIGNIFICANT CHANGE UP (ref 80–100)
MONOCYTES # BLD AUTO: 0.43 K/UL — SIGNIFICANT CHANGE UP (ref 0–0.9)
MONOCYTES NFR BLD AUTO: 9.6 % — SIGNIFICANT CHANGE UP (ref 2–14)
NEUTROPHILS # BLD AUTO: 2.74 K/UL — SIGNIFICANT CHANGE UP (ref 1.8–7.4)
NEUTROPHILS NFR BLD AUTO: 60.8 % — SIGNIFICANT CHANGE UP (ref 43–77)
NRBC # BLD: 0 /100 WBCS — SIGNIFICANT CHANGE UP (ref 0–0)
PLATELET # BLD AUTO: 243 K/UL — SIGNIFICANT CHANGE UP (ref 150–400)
POTASSIUM SERPL-MCNC: 3.9 MMOL/L — SIGNIFICANT CHANGE UP (ref 3.5–5.3)
POTASSIUM SERPL-SCNC: 3.9 MMOL/L — SIGNIFICANT CHANGE UP (ref 3.5–5.3)
PROT SERPL-MCNC: 7.6 G/DL — SIGNIFICANT CHANGE UP (ref 6–8.3)
RAPID RVP RESULT: SIGNIFICANT CHANGE UP
RBC # BLD: 5.01 M/UL — SIGNIFICANT CHANGE UP (ref 4.2–5.8)
RBC # FLD: 12.7 % — SIGNIFICANT CHANGE UP (ref 10.3–14.5)
SARS-COV-2 RNA SPEC QL NAA+PROBE: SIGNIFICANT CHANGE UP
SODIUM SERPL-SCNC: 139 MMOL/L — SIGNIFICANT CHANGE UP (ref 135–145)
TROPONIN I SERPL-MCNC: <0.015 NG/ML — SIGNIFICANT CHANGE UP (ref 0–0.04)
WBC # BLD: 4.5 K/UL — SIGNIFICANT CHANGE UP (ref 3.8–10.5)
WBC # FLD AUTO: 4.5 K/UL — SIGNIFICANT CHANGE UP (ref 3.8–10.5)

## 2021-06-07 PROCEDURE — 99285 EMERGENCY DEPT VISIT HI MDM: CPT | Mod: 25

## 2021-06-07 PROCEDURE — 0225U NFCT DS DNA&RNA 21 SARSCOV2: CPT

## 2021-06-07 PROCEDURE — 99284 EMERGENCY DEPT VISIT MOD MDM: CPT

## 2021-06-07 PROCEDURE — 94640 AIRWAY INHALATION TREATMENT: CPT

## 2021-06-07 PROCEDURE — 84484 ASSAY OF TROPONIN QUANT: CPT

## 2021-06-07 PROCEDURE — 80053 COMPREHEN METABOLIC PANEL: CPT

## 2021-06-07 PROCEDURE — 96374 THER/PROPH/DIAG INJ IV PUSH: CPT | Mod: XU

## 2021-06-07 PROCEDURE — 71046 X-RAY EXAM CHEST 2 VIEWS: CPT

## 2021-06-07 PROCEDURE — 74177 CT ABD & PELVIS W/CONTRAST: CPT

## 2021-06-07 PROCEDURE — 96375 TX/PRO/DX INJ NEW DRUG ADDON: CPT

## 2021-06-07 PROCEDURE — 85025 COMPLETE CBC W/AUTO DIFF WBC: CPT

## 2021-06-07 PROCEDURE — 36415 COLL VENOUS BLD VENIPUNCTURE: CPT

## 2021-06-07 PROCEDURE — 74177 CT ABD & PELVIS W/CONTRAST: CPT | Mod: 26,MA

## 2021-06-07 PROCEDURE — 83690 ASSAY OF LIPASE: CPT

## 2021-06-07 PROCEDURE — 71046 X-RAY EXAM CHEST 2 VIEWS: CPT | Mod: 26

## 2021-06-07 RX ORDER — IPRATROPIUM/ALBUTEROL SULFATE 18-103MCG
3 AEROSOL WITH ADAPTER (GRAM) INHALATION ONCE
Refills: 0 | Status: COMPLETED | OUTPATIENT
Start: 2021-06-07 | End: 2021-06-07

## 2021-06-07 RX ORDER — SODIUM CHLORIDE 9 MG/ML
1000 INJECTION INTRAMUSCULAR; INTRAVENOUS; SUBCUTANEOUS ONCE
Refills: 0 | Status: COMPLETED | OUTPATIENT
Start: 2021-06-07 | End: 2021-06-07

## 2021-06-07 RX ORDER — MORPHINE SULFATE 50 MG/1
4 CAPSULE, EXTENDED RELEASE ORAL ONCE
Refills: 0 | Status: DISCONTINUED | OUTPATIENT
Start: 2021-06-07 | End: 2021-06-07

## 2021-06-07 RX ORDER — ALBUTEROL 90 UG/1
2 AEROSOL, METERED ORAL
Qty: 1 | Refills: 0
Start: 2021-06-07

## 2021-06-07 RX ADMIN — SODIUM CHLORIDE 1000 MILLILITER(S): 9 INJECTION INTRAMUSCULAR; INTRAVENOUS; SUBCUTANEOUS at 10:47

## 2021-06-07 RX ADMIN — Medication 3 MILLILITER(S): at 10:40

## 2021-06-07 RX ADMIN — Medication 3 MILLILITER(S): at 11:34

## 2021-06-07 RX ADMIN — MORPHINE SULFATE 4 MILLIGRAM(S): 50 CAPSULE, EXTENDED RELEASE ORAL at 11:31

## 2021-06-07 RX ADMIN — SODIUM CHLORIDE 1000 MILLILITER(S): 9 INJECTION INTRAMUSCULAR; INTRAVENOUS; SUBCUTANEOUS at 11:35

## 2021-06-07 RX ADMIN — Medication 125 MILLIGRAM(S): at 10:42

## 2021-06-07 RX ADMIN — Medication 200 MILLIGRAM(S): at 16:04

## 2021-06-07 RX ADMIN — MORPHINE SULFATE 4 MILLIGRAM(S): 50 CAPSULE, EXTENDED RELEASE ORAL at 10:42

## 2021-06-07 RX ADMIN — Medication 3 MILLILITER(S): at 10:47

## 2021-06-07 NOTE — ED ADULT NURSE NOTE - OBJECTIVE STATEMENT
Pt. c/o asthma exacerbation ongoing for 2 weeks with some relief from albuterol. Pt c/o 1 week of LLQ pain but denies n/v/d.

## 2021-06-07 NOTE — ED PROVIDER NOTE - MUSCULOSKELETAL, MLM
Spine appears normal, range of motion is not limited, no muscle or joint tenderness. no leg swelling or redness

## 2021-06-07 NOTE — ED PROVIDER NOTE - CARE PLAN
Principal Discharge DX:	Pneumonia of right lower lobe due to infectious organism  Goal:	abdominal pain - perhaps muscular?

## 2021-06-07 NOTE — ED PROVIDER NOTE - PROGRESS NOTE DETAILS
given findings prob pna and not pe. pt had plans to see pulm. abx / steroids. feeling better and wants to go home, reviewed case and results w pt in Italian, questions answered and pt understands, advised return precautions and care plan.

## 2021-06-07 NOTE — ED PROVIDER NOTE - CLINICAL SUMMARY MEDICAL DECISION MAKING FREE TEXT BOX
screening ekg. ro pna. ro diverticulitis. labs / Symptomatic treatment. not cw asthma exacerbation. if no findings - prob will need to eval for pe or other cause of pt sxs.

## 2021-06-07 NOTE — ED PROVIDER NOTE - NSFOLLOWUPINSTRUCTIONS_ED_ALL_ED_FT
IMPORTANT INSTRUCTIONS FROM Dr. PIÑA:    Please follow up with your personal medical doctor in 24-48 hours. See pulmonologist this week as discussed.  Bring results from today to your visit.      Steroids and antibiotics as prescribed.  Albuterol as needed.  If you were advised to take any medications - be sure to review the package insert.    If your symptoms change, get worse or if you have any new symptoms, come to the ER right away.  If you have any questions, call the ER at the phone number on this page.

## 2021-06-07 NOTE — ED PROVIDER NOTE - OBJECTIVE STATEMENT
55 male asthmatic, has been having asthma attack for 2 wks, now back off of the steroids. Notes somewhat better w albuterol. still on inhaled medicine. never admitted to hosp for such. covid vaccine finished. no fever or uri sxs. notes +cough. now w also 1 wk left lower quadrant abdominal pain and no associated n/v/d/rectal bleed. feels like there is a ball there. hx of ventral hernia repair. pt initially requesting daughter to translate but she will leave soon.

## 2021-06-07 NOTE — ED PROVIDER NOTE - PATIENT PORTAL LINK FT
You can access the FollowMyHealth Patient Portal offered by [facility] by registering at the following website: [email or web address]. By joining [employer]’s FollowMyHealth portal, you will also be able to view your health information using other applications (apps) compatible with our system.

## 2021-07-14 NOTE — ED PROVIDER NOTE - CPE EDP ENMT NORM
Upon review of the In Basket request we have found as a result of outreach that patient did not have the requested item(s) completed  see contact info    Any additional questions or concerns should be emailed to the Practice Liaisons via [email or web address]  org email, please do not reply via In Basket      Thank you  Mary Coe normal...

## 2021-09-03 ENCOUNTER — EMERGENCY (EMERGENCY)
Facility: HOSPITAL | Age: 56
LOS: 1 days | Discharge: HOME | End: 2021-09-03
Attending: STUDENT IN AN ORGANIZED HEALTH CARE EDUCATION/TRAINING PROGRAM
Payer: MEDICAID

## 2021-09-03 VITALS
WEIGHT: 169.98 LBS | HEIGHT: 64 IN | RESPIRATION RATE: 20 BRPM | TEMPERATURE: 98 F | SYSTOLIC BLOOD PRESSURE: 148 MMHG | HEART RATE: 96 BPM | OXYGEN SATURATION: 97 % | DIASTOLIC BLOOD PRESSURE: 97 MMHG

## 2021-09-03 DIAGNOSIS — K46.9 UNSPECIFIED ABDOMINAL HERNIA WITHOUT OBSTRUCTION OR GANGRENE: Chronic | ICD-10-CM

## 2021-09-03 PROCEDURE — 99283 EMERGENCY DEPT VISIT LOW MDM: CPT

## 2021-09-03 PROCEDURE — 73562 X-RAY EXAM OF KNEE 3: CPT

## 2021-09-03 PROCEDURE — 99283 EMERGENCY DEPT VISIT LOW MDM: CPT | Mod: 25

## 2021-09-03 PROCEDURE — 73562 X-RAY EXAM OF KNEE 3: CPT | Mod: 26,LT

## 2021-09-03 RX ORDER — ACETAMINOPHEN 500 MG
975 TABLET ORAL ONCE
Refills: 0 | Status: COMPLETED | OUTPATIENT
Start: 2021-09-03 | End: 2021-09-03

## 2021-09-03 RX ADMIN — Medication 975 MILLIGRAM(S): at 19:19

## 2021-09-03 NOTE — ED PROVIDER NOTE - PHYSICAL EXAMINATION
General: well appearing male, no acute distress   HEENT: normocephalic, atraumatic   Respiratory: normal work of breathing  MSK: left knee swelling, tenderness to palpation. full ROM, no erythema or warmth, limping gait   Skin: warm, dry   Neuro: A&Ox3  Psych: appropriate affect

## 2021-09-03 NOTE — ED PROVIDER NOTE - CLINICAL SUMMARY MEDICAL DECISION MAKING FREE TEXT BOX
55M presenting with left knee pain. no emergnet findings on xray. likely msk pain vs tendon/ligament injury. will discharge with ortho follow-up.

## 2021-09-03 NOTE — ED PROVIDER NOTE - NSFOLLOWUPINSTRUCTIONS_ED_ALL_ED_FT
You were seen in the emergency department for knee pain after a fall.     Please follow-up with your primary care doctor in the next 24-48 hours.     Please take Ibuprofen dn Tylenol as prescribed on the bottles for pain control.     If you have any worsening symptoms, severe leg pain, swelling, or redness, please return to the emergency department.

## 2021-09-03 NOTE — ED PROVIDER NOTE - PATIENT PORTAL LINK FT
You can access the FollowMyHealth Patient Portal offered by [facility] by registering at the following website: [email or web address]. By joining [employer]’s FollowMyHealth portal, you will also be able to view your health information using other applications (apps) compatible with our system.

## 2021-09-03 NOTE — ED PROVIDER NOTE - OBJECTIVE STATEMENT
55M presenting with left knee pain after a fall. daughter translating per patient request. patient works as a  jumped off a 4ft ladder as is fell. denies falling or hitting his head. initially had no pain but now has pain with walking and moving his knee. took ibuporfen at home.

## 2022-08-07 ENCOUNTER — EMERGENCY (EMERGENCY)
Facility: HOSPITAL | Age: 57
LOS: 1 days | Discharge: HOME | End: 2022-08-07
Attending: STUDENT IN AN ORGANIZED HEALTH CARE EDUCATION/TRAINING PROGRAM
Payer: MEDICAID

## 2022-08-07 VITALS
DIASTOLIC BLOOD PRESSURE: 91 MMHG | SYSTOLIC BLOOD PRESSURE: 145 MMHG | HEIGHT: 64 IN | OXYGEN SATURATION: 95 % | TEMPERATURE: 98 F | HEART RATE: 71 BPM | RESPIRATION RATE: 16 BRPM | WEIGHT: 164.91 LBS

## 2022-08-07 DIAGNOSIS — K46.9 UNSPECIFIED ABDOMINAL HERNIA WITHOUT OBSTRUCTION OR GANGRENE: Chronic | ICD-10-CM

## 2022-08-07 PROCEDURE — 73130 X-RAY EXAM OF HAND: CPT | Mod: 26,LT

## 2022-08-07 PROCEDURE — 99283 EMERGENCY DEPT VISIT LOW MDM: CPT

## 2022-08-07 PROCEDURE — 73130 X-RAY EXAM OF HAND: CPT

## 2022-08-07 PROCEDURE — 99283 EMERGENCY DEPT VISIT LOW MDM: CPT | Mod: 25

## 2022-08-07 RX ORDER — ACETAMINOPHEN 500 MG
975 TABLET ORAL ONCE
Refills: 0 | Status: COMPLETED | OUTPATIENT
Start: 2022-08-07 | End: 2022-08-07

## 2022-08-07 RX ORDER — IBUPROFEN 200 MG
600 TABLET ORAL ONCE
Refills: 0 | Status: COMPLETED | OUTPATIENT
Start: 2022-08-07 | End: 2022-08-07

## 2022-08-07 RX ADMIN — Medication 975 MILLIGRAM(S): at 19:35

## 2022-08-07 RX ADMIN — Medication 600 MILLIGRAM(S): at 19:35

## 2022-08-07 RX ADMIN — Medication 600 MILLIGRAM(S): at 18:10

## 2022-08-07 RX ADMIN — Medication 975 MILLIGRAM(S): at 18:11

## 2022-08-07 NOTE — ED PROVIDER NOTE - PATIENT PORTAL LINK FT
You can access the FollowMyHealth Patient Portal offered by [facility] by registering at the following website: [email or web address]. By joining [employer]’s FollowMyHealth portal, you will also be able to view your health information using other applications (apps) compatible with our system.

## 2022-08-07 NOTE — ED PROVIDER NOTE - CLINICAL SUMMARY MEDICAL DECISION MAKING FREE TEXT BOX
57 y/o M w/ PMH as above presenting w/ finger pain s/p trauma. pt well appearing, no acute distress. L hand NVI, pain on palpation of digits 2-5. No snuff box tenderness. Possible finger fractures. Possible contusions. Does not appear dislocated. Plan for imaging, meds. Will reassess the need for additional interventions as clinically warranted.

## 2022-08-07 NOTE — ED PROVIDER NOTE - NSICDXPASTMEDICALHX_GEN_ALL_CORE_FT
PAST MEDICAL HISTORY:  Asthma     Gastritis, Helicobacter pylori     GERD (gastroesophageal reflux disease)     Pancreatitis

## 2022-08-07 NOTE — ED PROVIDER NOTE - OBJECTIVE STATEMENT
55 y/o M w/ PMh of asthma, GERD presenting w/ hand pain. Seen w/ daughter. Sami speaking, requesting daughter at bedside to translate. Reports on Friday was using [employer] and when he opened back door to truck 57 y/o M w/ PMh of asthma, GERD presenting w/ hand pain. Seen w/ daughter. Chinese speaking, requesting daughter at bedside to translate. Reports on Friday was using [employer] and when he opened back door to truck and got his fingers caught between truck door and wall. Pain worsened over the past few days which prompted his visit today. Took tylenol yesterday w/ minimal improvement. No numbness or tingling. Pain described as sharp, non radiating, worse w/ movement. Denies fevers, chills, headache, dizziness, blurred vision, chest pain, cough, shortness of breath, abdominal pain, n/v/d/c, urinary symptoms, rash. 57 y/o M w/ PMh of asthma, GERD presenting w/ hand pain. Seen w/ daughter. Hungarian speaking, requesting daughter at bedside to translate. Reports on Friday was using [employer] and when he opened back door to truck and got his fingers caught between truck door and wall. Pain worsened over the past few days which prompted his visit today. Took tylenol yesterday w/ minimal improvement. No numbness or tingling. Pain described as sharp, non radiating, worse w/ movement. UTD on tetanus. Denies fevers, chills, headache, dizziness, blurred vision, chest pain, cough, shortness of breath, abdominal pain, n/v/d/c, urinary symptoms, rash.

## 2022-08-07 NOTE — ED PROVIDER NOTE - PROGRESS NOTE DETAILS
Attending Isidro: xray w/o obvious fracture. Informed pt and daughter. Recommended rest and OTC meds. Return precautions provided, pt and daughter verbalized understanding. Ready for DC.

## 2022-08-07 NOTE — ED PROVIDER NOTE - NSFOLLOWUPCLINICS_GEN_ALL_ED_FT
[facility]  Orthopedics  [address] [place] [address]  Phone: [phone]  Fax: [phone]

## 2022-08-07 NOTE — ED PROVIDER NOTE - NSICDXFAMILYHX_GEN_ALL_CORE_FT
FAMILY HISTORY:  Mother  Still living? Unknown  Family history of irritable colon, Age at diagnosis: Age Unknown

## 2022-08-07 NOTE — ED PROVIDER NOTE - NSFOLLOWUPINSTRUCTIONS_ED_ALL_ED_FT
55 y/o M w/ PMH as above presenting w/ finger pain s/p trauma. pt well appearing, no acute distress. L hand NVI, pain on palpation of digits 2-5. No snuff box tenderness. Possible finger fractures. Possible contusions. Does not appear dislocated. Plan for imaging, meds. Will reassess the need for additional interventions as clinically warranted. Your xrays did not show any obvious fractures.    1) Follow up with your doctor this week. If your pain does not improve you can follow up with the orthopedic doctor. You were provided with their information.  2) Return to the ED immediately for new or worsening symptoms  3) Please continue to take any home medications as prescribed  4) Your test results from your ED visit were discussed with you prior to discharge  5) Please take Tylenol 975 mg every 6 hours as needed for pain. Please do not exceed more than 4,000mg of Tylenol in a day   6) Please take Motrin 600mg by mouth every 6 hours as needed for pain. Please take this medication with food.    Lori radiografías no mostraron fracturas obvias.    1) Sepideh un seguimiento con larson médico esta semana. Si larson dolor no mejora, puede hacer un seguimiento con el médico ortopédico. Se le proporcionó larson información.  2) Regrese al servicio de urgencias de inmediato por síntomas nuevos o que empeoran  3) Continúe tomando los medicamentos para el hogar según lo recetado.  4) Los resultados de larson prueba de larson visita al servicio de urgencias se analizaron con usted antes del miki.  5) [place] Tylenol 975 mg cada 6 horas según sea necesario para el dolor. No exceda más de 4,000 mg de Tylenol en un día  6) [place] Motrin 600 mg por vía oral cada 6 horas según sea necesario para el dolor. [place] kar medicamento con alimentos.

## 2022-08-07 NOTE — ED PROVIDER NOTE - PHYSICAL EXAMINATION
Gen: NAD, AOx3, able to make needs known, non-toxic  Head: NCAT  HEENT: EOMI, oral mucosa moist, normal conjunctiva  Lung: CTAB, no respiratory distress, no wheezes/rhonchi/rales B/L, speaking in full sentences  CV: RRR, no murmurs  Abd: non distended, soft, nontender, no guarding  MSK: no visible deformities. L hand: NVI, digits 2-5 tender to palpation, 0.5 cm palmar side of 3rd and 4th digit  Neuro: Appears non focal  Skin: Warm, well perfused  Psych: normal affect Gen: NAD, AOx3, able to make needs known, non-toxic  Head: NCAT  HEENT: EOMI, oral mucosa moist, normal conjunctiva  Lung: CTAB, no respiratory distress, no wheezes/rhonchi/rales B/L, speaking in full sentences  CV: RRR, no murmurs  Abd: non distended, soft, nontender, no guarding  MSK: no visible deformities. L hand: NVI, digits 2-5 tender to palpation, 0.5 cm abrasion palmar side of 3rd and 4th digit  Neuro: Appears non focal  Skin: Warm, well perfused  Psych: normal affect

## 2022-08-18 NOTE — ED PROVIDER NOTE - SKIN, MLM
Skin normal color for race, warm, dry and intact. No evidence of rash. Consent 1/Introductory Paragraph: The rationale for Mohs was explained to the patient and consent was obtained. The risks, benefits and alternatives to therapy were discussed in detail. Specifically, the risks of infection, scarring, bleeding, prolonged wound healing, incomplete removal, allergy to anesthesia, nerve injury and recurrence were addressed. Prior to the procedure, the treatment site was clearly identified and confirmed by the patient. All components of Universal Protocol/PAUSE Rule completed.

## 2022-11-01 NOTE — ED ADULT NURSE NOTE - ALCOHOL PRE SCREEN (AUDIT - C)
[facility]  Wound Care    Patient Name:  Eva Bloom   MRN:  [id number]  Date: 11/1/2022  Diagnosis: Sepsis    History:     Past Medical History:   Diagnosis Date    Anemia     Bronchitis 03/01/2017    Cancer 2016    kidney cancer    CHF (congestive heart failure), NYHA class II, chronic, systolic     CMV (cytomegalovirus) antibody positive     Essential hypertension 9/23/2015    H/O herpes simplex type 2 infection     Herpes simplex type 1 antibody positive     History of kidney cancer     s/p left nephrectomy 1/2016    Hyperparathyroidism, unspecified     Hyperuricemia without signs of inflammatory arthritis and tophaceous disease     Kidney stones     LGSIL (low grade squamous intraepithelial dysplasia)     Myocardiopathy 7/21/2017    Prediabetes     Proteinuria     Renal disorder     Thyroid nodule     Urate nephropathy        Social History     Socioeconomic History    Marital status:     Number of children: 2   Occupational History    Occupation: [place]     Employer: [id number]   Tobacco Use    Smoking status: Never    Smokeless tobacco: Never   Substance and Sexual Activity    Alcohol use: No     Comment: . 2 children. works at [employer].    Drug use: No    Sexual activity: Yes     Partners: Male     Social Determinants of Health     Financial Resource Strain: Low Risk     Difficulty of Paying Living Expenses: Not hard at all   Food Insecurity: No Food Insecurity    Worried About Running Out of Food in the Last Year: Never true    Ran Out of Food in the Last Year: Never true   Transportation Needs: No Transportation Needs    Lack of Transportation (Medical): No    Lack of Transportation (Non-Medical): No   Physical Activity: Insufficiently Active    Days of Exercise per Week: 3 days    Minutes of Exercise per Session: 30 min   Stress: No Stress Concern Present    Feeling of Stress : Not at all   Social Connections: Socially Integrated    Frequency of Communication with Friends  and Family: More than three times a week    Frequency of Social Gatherings with Friends and Family: Once a week    Attends Mosque Services: 1 to 4 times per year    Active Member of Clubs or Organizations: Yes    Attends Club or Organization Meetings: 1 to 4 times per year    Marital Status:    Housing Stability: Low Risk     Unable to Pay for Housing in the Last Year: No    Number of Places Lived in the Last Year: 1    Unstable Housing in the Last Year: No       Precautions:     Allergies as of 10/29/2022 - Reviewed 10/29/2022   Allergen Reaction Noted    Coreg [carvedilol] Other (See Comments) 05/17/2017    Allopurinol  07/17/2012       [facility] Assessment Details/Treatment   Nursing consult for altered skin integrity - dark areas to coccyx; patient frail and thin  A 56 year old female admitted 10/29/22 from home with sepsis; lactic acidosis; transaminitis; elevated troponin; paroxysmal atrial fibrillation; ICD; chronic kidney disease-mineral and bone disorder; anemia in ESRD; hyperkalemia; CHF; nonrheumatic mitral valve regurgitation; ESRD on HD; pulmonary hypertension; non ischemic cardiomyopathy; S/P left nephrectomy; history kidney cancer; essential hypertension  11/1 WBC 10.04 Hgb 10.0 Hct 29.8 Alb 2.0 Weight 110 lb  On Isoflex mattress; Marcelino score 13; dependent bed mobility  Assessment:  Sacral area darkened, but doesn't appear to be DTI. Epidermis intact and blanches. Prominent bone with minimal fat padding over bone. Buttocks- flat without muscle and padding to support sacrum off bed.  Treatment:  Pressure Injury Prevention Interventions with frequent pressure shifts  Patient not able to lift feet off bed. To avoid pressure to heels.   Provided with pillow under calves to suspend heels off bed. Nursing can use EHOB boots to offload pressure from heels.   Applied Mepilex preventative dressing to sacrum.   Patient using pillow under low back for comfort. Attempted to use chair cushion, but patient  requested pillow to be replaced.   Informed patient and  of high risk for development of pressure injuries and importance of pressure injury prevention.   Nursing to continue Pressure Injury Prevention  Intervention with frequent pressure shifts.   Reconsult [facility] nurse as needed.  Orders placed.       11/01/2022     Statement Selected

## 2022-12-14 NOTE — ED ADULT NURSE NOTE - IN THE PAST 12 MONTHS HAVE YOU USED DRUGS OTHER THAN THOSE REQUIRED FOR MEDICAL REASON?
[Respiration, Rhythm And Depth] : normal respiratory rhythm and effort [Auscultation Breath Sounds / Voice Sounds] : lungs were clear to auscultation bilaterally [Heart Rate And Rhythm] : heart rate was normal and rhythm regular [Heart Sounds] : normal S1 and S2 [No Focal Deficits] : no focal deficits [Oriented To Time, Place, And Person] : oriented to person, place, and time [Impaired Insight] : insight and judgment were intact [Affect] : the affect was normal [Sclera] : the sclera and conjunctiva were normal [PERRL With Normal Accommodation] : pupils were equal in size, round, and reactive to light [Neck Appearance] : the appearance of the neck was normal [] : the neck was supple [Neck Cervical Mass (___cm)] : no neck mass was observed No [Abdomen Soft] : soft [Abdomen Tenderness] : non-tender

## 2023-04-12 NOTE — ED ADULT TRIAGE NOTE - NS ED NURSE BANDS TYPE
We haven't ordered any labs on her. And it looks like she didn't keep her last two appointments. I will call to find out what's going on. Name band;

## 2023-06-09 NOTE — ED PROVIDER NOTE - DATE/TIME 1
07-Aug-2022 19:28 Clindamycin Counseling: I counseled the patient regarding use of clindamycin as an antibiotic for prophylactic and/or therapeutic purposes. Clindamycin is active against numerous classes of bacteria, including skin bacteria. Side effects may include nausea, diarrhea, gastrointestinal upset, rash, hives, yeast infections, and in rare cases, colitis.

## 2024-04-23 NOTE — DISCHARGE NOTE ADULT - NS AS DC FOLLOWUP INST YN
Chart reviewed. Preop nursing care completed per orders. Safe surgery checklist complete. Pt denies any open wounds cuts or sores. Pt denies any metal in body. Belongings on stretcher. Waiting for surgical consent prior to surgery. Pt AAOX4, VSS on room air. Pt toileted, Bed locked in lowest position, Call light within reach. Pt denies any needs at this time.    Yes

## 2024-10-17 ENCOUNTER — TRANSCRIPTION ENCOUNTER (OUTPATIENT)
Age: 59
End: 2024-10-17

## 2024-10-17 ENCOUNTER — EMERGENCY (EMERGENCY)
Facility: HOSPITAL | Age: 59
LOS: 1 days | Discharge: HOME | End: 2024-10-17
Attending: STUDENT IN AN ORGANIZED HEALTH CARE EDUCATION/TRAINING PROGRAM
Payer: MEDICAID

## 2024-10-17 VITALS
SYSTOLIC BLOOD PRESSURE: 138 MMHG | HEIGHT: 61 IN | WEIGHT: 149.91 LBS | DIASTOLIC BLOOD PRESSURE: 64 MMHG | OXYGEN SATURATION: 98 % | HEART RATE: 80 BPM | RESPIRATION RATE: 16 BRPM | TEMPERATURE: 98 F

## 2024-10-17 VITALS
RESPIRATION RATE: 18 BRPM | DIASTOLIC BLOOD PRESSURE: 60 MMHG | TEMPERATURE: 97 F | HEART RATE: 76 BPM | SYSTOLIC BLOOD PRESSURE: 139 MMHG | OXYGEN SATURATION: 99 %

## 2024-10-17 DIAGNOSIS — K46.9 UNSPECIFIED ABDOMINAL HERNIA WITHOUT OBSTRUCTION OR GANGRENE: Chronic | ICD-10-CM

## 2024-10-17 LAB
ANION GAP SERPL CALC-SCNC: 6 MMOL/L — SIGNIFICANT CHANGE UP (ref 5–17)
BUN SERPL-MCNC: 28 MG/DL — HIGH (ref 7–18)
CALCIUM SERPL-MCNC: 9.2 MG/DL — SIGNIFICANT CHANGE UP (ref 8.4–10.5)
CHLORIDE SERPL-SCNC: 110 MMOL/L — HIGH (ref 96–108)
CO2 SERPL-SCNC: 24 MMOL/L — SIGNIFICANT CHANGE UP (ref 22–31)
CREAT SERPL-MCNC: 0.93 MG/DL — SIGNIFICANT CHANGE UP (ref 0.5–1.3)
EGFR: 94 ML/MIN/1.73M2 — SIGNIFICANT CHANGE UP
GLUCOSE SERPL-MCNC: 94 MG/DL — SIGNIFICANT CHANGE UP (ref 70–99)
HCT VFR BLD CALC: 46.9 % — SIGNIFICANT CHANGE UP (ref 39–50)
HGB BLD-MCNC: 16.1 G/DL — SIGNIFICANT CHANGE UP (ref 13–17)
MCHC RBC-ENTMCNC: 32.4 PG — SIGNIFICANT CHANGE UP (ref 27–34)
MCHC RBC-ENTMCNC: 34.3 GM/DL — SIGNIFICANT CHANGE UP (ref 32–36)
MCV RBC AUTO: 94.4 FL — SIGNIFICANT CHANGE UP (ref 80–100)
NRBC # BLD: 0 /100 WBCS — SIGNIFICANT CHANGE UP (ref 0–0)
PLATELET # BLD AUTO: 281 K/UL — SIGNIFICANT CHANGE UP (ref 150–400)
POTASSIUM SERPL-MCNC: 4 MMOL/L — SIGNIFICANT CHANGE UP (ref 3.5–5.3)
POTASSIUM SERPL-SCNC: 4 MMOL/L — SIGNIFICANT CHANGE UP (ref 3.5–5.3)
RBC # BLD: 4.97 M/UL — SIGNIFICANT CHANGE UP (ref 4.2–5.8)
RBC # FLD: 12.4 % — SIGNIFICANT CHANGE UP (ref 10.3–14.5)
SODIUM SERPL-SCNC: 140 MMOL/L — SIGNIFICANT CHANGE UP (ref 135–145)
WBC # BLD: 6.23 K/UL — SIGNIFICANT CHANGE UP (ref 3.8–10.5)
WBC # FLD AUTO: 6.23 K/UL — SIGNIFICANT CHANGE UP (ref 3.8–10.5)

## 2024-10-17 PROCEDURE — 99284 EMERGENCY DEPT VISIT MOD MDM: CPT | Mod: 25

## 2024-10-17 PROCEDURE — 99284 EMERGENCY DEPT VISIT MOD MDM: CPT

## 2024-10-17 PROCEDURE — 80048 BASIC METABOLIC PNL TOTAL CA: CPT

## 2024-10-17 PROCEDURE — 70450 CT HEAD/BRAIN W/O DYE: CPT | Mod: MC

## 2024-10-17 PROCEDURE — 96375 TX/PRO/DX INJ NEW DRUG ADDON: CPT

## 2024-10-17 PROCEDURE — 70450 CT HEAD/BRAIN W/O DYE: CPT | Mod: 26,MC

## 2024-10-17 PROCEDURE — 36415 COLL VENOUS BLD VENIPUNCTURE: CPT

## 2024-10-17 PROCEDURE — 85027 COMPLETE CBC AUTOMATED: CPT

## 2024-10-17 PROCEDURE — 96374 THER/PROPH/DIAG INJ IV PUSH: CPT

## 2024-10-17 RX ORDER — METOCLOPRAMIDE HCL 5 MG
10 TABLET ORAL ONCE
Refills: 0 | Status: COMPLETED | OUTPATIENT
Start: 2024-10-17 | End: 2024-10-17

## 2024-10-17 RX ORDER — ACETAMINOPHEN 325 MG
1000 TABLET ORAL ONCE
Refills: 0 | Status: COMPLETED | OUTPATIENT
Start: 2024-10-17 | End: 2024-10-17

## 2024-10-17 RX ADMIN — Medication 400 MILLIGRAM(S): at 14:12

## 2024-10-17 RX ADMIN — Medication 10 MILLIGRAM(S): at 14:12

## 2024-10-17 NOTE — ED ADULT NURSE NOTE - NS ED NURSE LEVEL OF CONSCIOUSNESS MENTAL STATUS
"Michael Hopper is a 57 year old male with history of CAD and hypertension who presents to ED for further evaluation of nausea and vomiting that began three days ago. He came to the ED due dizziness and lightheadedness upon stand and "cloudy thinking". He admits to some generalized abdominal pain. His last BM was one week ago. He denies fever, chest pain, or palpitations. He denies opioid use.     In ED, CXR negative. Labs revealed elevated liver enzymes and elevated Total Bilirubin. Abdominal ultrasound and lipase are pending. Initial troponin 0.159, trending down 0.131. Case has been discussed with  Cardiology. Patient will be placed in observation under the care of hospital medicine.   " Awake

## 2024-10-17 NOTE — ED PROVIDER NOTE - MDM ORDERS SUBMITTED SELECTION
hx con't  Cardiology following for leg edema and SOB> multifactorial, likely aspiration PNA and diastolic heart failure.    7/13 Ptn looks better today, more communicative, smiling, eating rice pudding, coughing after each bite. awaitng S&S, will need MBS. ptn is hungry. she used to have a PEG, was removed few years ago.    CTchest 7/12/23 IMPRESSION: Upper lung predominant bilateral multifocal consolidation with branching tubular opacities, suggestive of endobronchial spread of infection. Interval right apical thick-walled cyst since 2019. TB should be considered in the appropriate clinical setting,among other infectious etiologies.  Small right pleural effusion.    Pt very well known to this service since February 2018 with multiple bedside swallow exams and FEES exams. Most recent recommendations on bedside swallow exam 1/2/2022 were for regular texture solids and thin liquids - at this time, pt did not have trach and peg. Most recent objective swallow testng completed at  with MBS results (4/16/2018) which found delayed oral prep and delayed pharyngeal trigger on food textures along with penetration and spontaneous retrieval of thin liquids via straw. Patient was +trach, +pmv at that time. Medications

## 2024-10-17 NOTE — ED ADULT NURSE NOTE - OBJECTIVE STATEMENT
Patient c/o left eye and facial pain x1 day. Pt denies any head injury, Patient c/o left eye and facial pain x1 day. Pt denies any head injury, dizziness, nausea or blurry vision.

## 2024-10-17 NOTE — ED ADULT TRIAGE NOTE - PATIENT/CAREGIVER ACCEPTED INTERPRETER SERVICES
PRIMARY CARE CLINIC FOLLOW UP VISIT  Chief Complaint   Patient presents with   • Hip Pain     she wants to talk about pain management      History of Present Illness     Hip arthritis  Has been having ongoing left hip pain. Was getting tramadol from Dr. Salgado but now that practice isn't handling chronic pain medications. Has had a hip injection before but it didn't help. Dr. Salgado did discuss hip replacement with her but she's not prepared for that option yet. Has been taking several advil daily and also tramadol. Takes 2-3 tablets of tramadol a day. Takes 4-8 advil a day.     Current Outpatient Prescriptions   Medication Sig Dispense Refill   • trazodone (DESYREL) 100 MG Tab TAKE 2 TABLETS BY MOUTH AT BEDTIME (Patient taking differently: once at HS) 180 Tab 0   • magnesium gluconate (MAG-G) 500 MG tablet Take 500 mg by mouth 3 times a day.     • Calcium Carbonate-Vitamin D (CALCIUM + D PO) Take  by mouth.     • ibuprofen (MOTRIN) 800 MG TABS Take 1 Tab by mouth every 8 hours as needed for Mild Pain. 60 Each 3   • Coenzyme Q10 (CO Q 10 PO) Take  by mouth.     • Multiple Vitamin (MULTI-VITAMIN PO) Take  by mouth.     • tramadol (ULTRAM) 50 MG Tab Take 1-2 Tabs by mouth every four hours as needed. 45 Tab 0   • simvastatin (ZOCOR) 20 MG Tab Take 1 Tab by mouth every bedtime. TAKE 1 TAB BY MOUTH EVERY EVENING. 90 Tab 2   • fluticasone (CUTIVATE) 0.05 % cream Apply  to affected area(s) 2 times a day. Patient to apply to affected area BID 1 Tube 0     No current facility-administered medications for this visit.      Past Medical History:   Diagnosis Date   • Acute midline thoracic back pain 10/19/2016   • Hyperlipidemia 6/17/2013   • Insomnia    • Menopause    • Osteopenia      Past Surgical History:   Procedure Laterality Date   • UMBILICAL HERNIA REPAIR  10/21/2009    Performed by FLORIAN BHANDARI at SURGERY SAME DAY [facility] ORS   • OTHER ORTHOPEDIC SURGERY      hammertoe correction bilat   • OTHER ORTHOPEDIC  "SURGERY      lt arm   • ULNA ORIF      lt     Social History   Substance Use Topics   • Smoking status: Former Smoker     Packs/day: 0.25     Years: 15.00     Types: Cigarettes     Quit date: 8/12/1999   • Smokeless tobacco: Never Used   • Alcohol use No      Comment: former heavy use, 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       Family History   Problem Relation Age of Onset   • Adopted: Yes     Family Status   Relation Status   • Mother Other   • Father Other     Allergies: Patient has no known allergies.    ROS  As per HPI above. All other systems reviewed and negative.        Objective   Blood pressure 110/70, pulse (!) 103, temperature 36.9 °C (98.4 °F), resp. rate 12, height 1.702 m (5' 7\"), weight 53.5 kg (118 lb), SpO2 95 %. Body mass index is 18.48 kg/m².    General: alert and oriented, pleasant, cooperative    Assessment and Plan   The following treatment plan was discussed     1. Hip arthritis  Discussed that at the rate she's taking Advil (up to 8 tablets a day at 200 mg each) and 2-3x tramadol 50 mg she is going to have adverse affects and possible irreversible organ damage. She expressed understanding and Dr. Salgado had recommended possible hip replacement which she would really like to avoid. Referred to pain clinic for chronic opiate management and she will consider consultation with orthopaedic surgery.   - REFERRAL TO PAIN CLINIC  - COMP METABOLIC PANEL; Future    2. Routine adult health maintenance  Due for fasting labs, ordered.   - CBC WITH DIFFERENTIAL; Future  - VITAMIN D,25 HYDROXY; Future  - LIPID PROFILE; Future      Healthcare maintenance     Health Maintenance Due   Topic Date Due   • PAP SMEAR  06/18/2016       Return in about 8 weeks (around 3/17/2018) for gyn/pap.    Kalpesh Hylton MD  Internal Medicine  [facility]                   " yes

## 2024-10-17 NOTE — ED ADULT NURSE NOTE - NS ED NURSE IV DC DT
SUBJECTIVE:   Marilia Palma is a 72 year old female who presents to clinic today for the following   health issues:    Here with sign language interpretor.      Hyperlipidemia Follow-Up      Rate your low fat/cholesterol diet?: good    Taking statin?  Yes, no muscle aches from statin    Other lipid medications/supplements?:  None    Has history of hyperlipidemia.  On statin for this, denies any significant side effects of this medication.      Latest labs reviewed:    Recent Labs   Lab Test 05/07/19  1051 01/30/18  1122  10/02/15  0758 07/07/15  0803   CHOL 157 121   < > 273* 215*   HDL 54 56   < > 50* 48*   LDL 67 47   < > 183* 131*   TRIG 179* 91   < > 200* 178*   CHOLHDLRATIO  --   --   --  5.5* 4.5    < > = values in this interval not displayed.        Lab Results   Component Value Date    AST 19 05/07/2019         Hypertension Follow-up      Outpatient blood pressures are not being checked.    Low Salt Diet: no added salt    Blood presure remains well controlled at home  Readings outside clinic are within normal limits.  Reviewed last 6 BP readings in chart:  BP Readings from Last 6 Encounters:   05/07/19 124/80   03/27/18 111/57   02/20/18 114/82   01/30/18 102/66   12/29/17 128/70   02/10/17 130/76     He has not experienced any significant side effects from medicaiotns for hypertension.    NO active cardiac complaints or symptoms with exercise.       Amount of exercise or physical activity: None    Problems taking medications regularly: No    Medication side effects: none    Diet: regular (no restrictions)      3.  Cough: Received albuterol inhaler after an episode of postinfectious Antonio spasm.  She was more acutely at that time for a few weeks.  She continues to use it intermittently a few times a year typically on the most hot and humid days.  She would like a refill available in case she needs it again.  She has no actual history of asthma.    4.    The patient has had a history of ongoing obesity.  " Reviewed the weigth curves.   Their current BMI is:  Body mass index is 37.91 kg/m .  Reviewed previous attempts at weight loss which have not been successful in producing prolonged weigth loss.   Discussed current eating and exercise habits.     Reviewed weights in chart:  Wt Readings from Last 10 Encounters:   05/07/19 97.1 kg (214 lb)   03/27/18 90.3 kg (199 lb)   02/20/18 91.5 kg (201 lb 12.8 oz)   01/30/18 91.6 kg (202 lb)   12/29/17 94.3 kg (208 lb)   02/10/17 96.5 kg (212 lb 11.2 oz)   10/06/15 92.3 kg (203 lb 8 oz)   07/08/15 92.3 kg (203 lb 6.4 oz)   04/20/15 92.4 kg (203 lb 12.8 oz)   03/20/15 93.4 kg (206 lb)      5.  The blood count has been lower than expected and desired last March 2019.   No evidence for obvious blood loss.  No recent surgeries, no nosebleeds, no obvious intestinal blood loss, no hematochezia.    Does not take NSAIDs regularly, does not drink alcohol regularly, does not donate blood products regularly.   Reports nutrition as \"good\"    March 2018 colonoscopy showed no obviosu source of blood loss.   March 2018 endoscopy showed some gastritis and Helicobacter Pylori organisms.      Reviewed labs in [facility]:    Lab Results   Component Value Date    HGB 11.5 05/07/2019    HGB 9.6 02/15/2018    HGB 10.8 01/30/2018    HGB 12.4 02/15/2017    HGB 12.9 07/07/2015    HGB 13.4 11/28/2012              Annual Wellness Visit    Are you in the first 12 months of your Medicare Part B coverage?  No    Physical Health:    In general, how would you rate your overall physical health? good    If you drink alcohol do you typically have >3 drinks per day or >7 drinks per week? No    Do you usually eat at least 4 servings of fruit and vegetables a day, include whole grains & fiber and avoid regularly eating high fat or \"junk\" foods? Yes    Needs assistance for the following daily activities: telephone use    Which of the following safety concerns are present in your home?  Deaf since childhood     Hearing " impairment: Yes, chronically deaf since childhood    In the past 6 months, have you been bothered by leaking of urine? no    Mental Health:    In general, how would you rate your overall mental or emotional health? good  PHQ-2 Score: 0    Do you feel safe in your environment? Yes    Do you have a Health Care Directive? No: Advance care planning was reviewed with patient; patient declined at this time.    Fall risk:  Fallen 2 or more times in the past year?: No  Any fall with injury in the past year?: No  click delete button to remove this line now  Cognitive Screenin) Repeat 3 items (Leader, Season, Table)    2) Clock draw: NORMAL  3) 3 item recall: Recalls 3 objects  Results: 3 items recalled: COGNITIVE IMPAIRMENT LESS LIKELY    Mini-CogTM Copyright S Samantha. Licensed by the author for use in [place]; reprinted with permission ([email or web address]). All rights reserved.      Current providers sharing in care for this patient include:   Patient Care Team:  Chris Bhatia MD as PCP - General (Internal Medicine)  Chris Bhatia MD as Assigned PCP        Do you have sleep apnea, excessive snoring or daytime drowsiness?: no        Additional history: as documented    Reviewed  and updated as needed this visit by clinical staff  Tobacco  Allergies  Meds  Med Hx         Reviewed and updated as needed this visit by Provider  Med Hx           Additional history: as documented    Reviewed  and updated as needed this visit by clinical staff  Allergies  Meds         Reviewed and updated as needed this visit by Provider               Past Medical History:  ---------------------------  Past Medical History:   Diagnosis Date     Deaf 1946     HTN (hypertension)      Hyperlipidemia LDL goal < 130 12     pre-treatment     Obesity (BMI 30-39.9) 12/3/12    BMI 37       Past Surgical History:  ---------------------------  Past Surgical History:   Procedure Laterality Date      ESOPHAGOSCOPY, GASTROSCOPY, DUODENOSCOPY (EGD), COMBINED N/A 3/27/2018    Procedure: COMBINED ESOPHAGOSCOPY, GASTROSCOPY, DUODENOSCOPY (EGD), BIOPSY SINGLE OR MULTIPLE;;  Surgeon: George Miles MD;  Location[facility]       Current Medications:  ---------------------------  Current Outpatient Medications   Medication Sig Dispense Refill     albuterol (PROAIR HFA/PROVENTIL HFA/VENTOLIN HFA) 108 (90 Base) MCG/ACT inhaler Inhale 2 puffs into the lungs every 4 hours as needed for shortness of breath / dyspnea or wheezing 18 g 5     ASPIRIN NOT PRESCRIBED (INTENTIONAL) Please choose reason not prescribed, below       atorvastatin (LIPITOR) 20 MG tablet Take 1 tablet (20 mg) by mouth daily 90 tablet 3     lisinopril-hydrochlorothiazide (PRINZIDE/ZESTORETIC) 20-12.5 MG tablet Take 2 tablets by mouth every morning 180 tablet 3     montelukast (SINGULAIR) 10 MG tablet Take 1 tablet (10 mg) by mouth At Bedtime 30 tablet 1       Allergies:  -------------  No Known Allergies    Social History:  -------------------  Social History     Socioeconomic History     Marital status:      Spouse name: Not on file     Number of children: Not on file     Years of education: Not on file     Highest education level: Not on file   Occupational History     Not on file   Social Needs     Financial resource strain: Not on file     Food insecurity:     Worry: Not on file     Inability: Not on file     Transportation needs:     Medical: Not on file     Non-medical: Not on file   Tobacco Use     Smoking status: Never Smoker     Smokeless tobacco: Never Used   Substance and Sexual Activity     Alcohol use: No     Drug use: No     Sexual activity: Never   Lifestyle     Physical activity:     Days per week: Not on file     Minutes per session: Not on file     Stress: Not on file   Relationships     Social connections:     Talks on phone: Not on file     Gets together: Not on file     Attends Yarsanism service: Not on file     Active member of  "club or organization: Not on file     Attends meetings of clubs or organizations: Not on file     Relationship status: Not on file     Intimate partner violence:     Fear of current or ex partner: Not on file     Emotionally abused: Not on file     Physically abused: Not on file     Forced sexual activity: Not on file   Other Topics Concern     Parent/sibling w/ CABG, MI or angioplasty before 65F 55M? Not Asked   Social History Narrative     Not on file       Family Medical History:  ------------------------------  Family History   Problem Relation Age of Onset     Family History Negative Mother          natural causes age 90     Cerebrovascular Disease Father 65         after atroke     Hypertension Father          ROS:  REVIEW OF SYSTEMS:    RESP: negative for cough, dyspnea, wheezing, hemoptysis  CV: negative for chest pain, palpitations, PND, RAMIREZ, orthopnea; reports no significant changes in their ability to perform physical activity (from cardiovascular standpoint)  GI: negative for dysphagia, N/V, pain, melena, diarrhea and constipation  NEURO: negative for new numbness/tingling, paralysis, incoordination, or focal weakness     OBJECTIVE:                                                    /80   Pulse 98   Temp 98.3  F (36.8  C) (Temporal)   Ht 1.6 m (5' 3\")   Wt 97.1 kg (214 lb)   SpO2 98%   BMI 37.91 kg/m       GENERAL alert and no distress  EYES:  Normal sclera,conjunctiva, EOMI  HENT: oral and posterior pharynx without lesions or erythema, facies symmetric  NECK: Neck supple. No LAD, without thyroidmegaly.  RESP: Clear to ausculation bilaterally without wheezes or crackles. Normal BS in all fields.  CV: RRR normal S1S2 without murmurs, rubs or gallops.  LYMPH: no cervical lymph adenopathy appreciated  MS: extremities- no gross deformities of the visible extremities noted,   EXT:  no lower extremity edema  PSYCH: Alert and oriented times 3; speech- coherent  SKIN:  No obvious significant " skin lesions on visible portions of face     Results for orders placed or performed in visit on 05/07/19   Iron and iron binding capacity   Result Value Ref Range    Iron 58 35 - 180 ug/dL    Iron Binding Cap 304 240 - 430 ug/dL    Iron Saturation Index 19 15 - 46 %   Lipid panel reflex to direct LDL Fasting   Result Value Ref Range    Cholesterol 157 <200 mg/dL    Triglycerides 179 (H) <150 mg/dL    HDL Cholesterol 54 >49 mg/dL    LDL Cholesterol Calculated 67 <100 mg/dL    Non HDL Cholesterol 103 <130 mg/dL   CBC with platelets   Result Value Ref Range    WBC 8.2 4.0 - 11.0 10e9/L    RBC Count 3.82 3.8 - 5.2 10e12/L    Hemoglobin 11.5 (L) 11.7 - 15.7 g/dL    Hematocrit 35.0 35.0 - 47.0 %    MCV 92 78 - 100 fl    MCH 30.1 26.5 - 33.0 pg    MCHC 32.9 31.5 - 36.5 g/dL    RDW 12.9 10.0 - 15.0 %    Platelet Count 219 150 - 450 10e9/L   Comprehensive metabolic panel   Result Value Ref Range    Sodium 133 133 - 144 mmol/L    Potassium 3.9 3.4 - 5.3 mmol/L    Chloride 102 94 - 109 mmol/L    Carbon Dioxide 28 20 - 32 mmol/L    Anion Gap 3 3 - 14 mmol/L    Glucose 107 (H) 70 - 99 mg/dL    Urea Nitrogen 21 7 - 30 mg/dL    Creatinine 0.77 0.52 - 1.04 mg/dL    GFR Estimate 76 >60 mL/min/[1.73_m2]    GFR Estimate If Black 88 >60 mL/min/[1.73_m2]    Calcium 9.7 8.5 - 10.1 mg/dL    Bilirubin Total 0.4 0.2 - 1.3 mg/dL    Albumin 3.9 3.4 - 5.0 g/dL    Protein Total 7.8 6.8 - 8.8 g/dL    Alkaline Phosphatase 104 40 - 150 U/L    ALT 27 0 - 50 U/L    AST 19 0 - 45 U/L         ASSESSMENT/PLAN:                                                      (Z00.00) Medicare annual wellness visit, subsequent  (primary encounter diagnosis)  Comment: Discussed cardiac disease risk factors and cardiac disease risk factor modification, including diabetes screening, blood pressure screening (and management if indicated), and cholesterol screening.   Reviewed immunzation guidelines, including pneumococcal vaccines, annual influenza, and shingles  vaccines.   Discussed routine cancer screenings, including skin cancer, colon cancer screening for everyone until age 80, prostate cancer screening in men until age 75, mammogram and PAP/pelvic for women until age 75.   Recommended regular dentist visits to care for remaining teeth.   Recommended regular screening for vision and glaucoma.   Recommended safe driving and accident avoidance.   Plan: Iron and iron binding capacity          (E66.01) Obesity (BMI 35.0-39.9) with comorbidity (H)  Comment: The patient's current BMI is: Body mass index is 37.91 kg/m .  Obesity is a biological preventable and treatable disease, which is associated with significantly increased risk of many acute and chronic health conditions. Obesity has now been recognized as a chronic disease by the American Medical Association.  A range of serious co-morbidities are associated with obesity including increased risk for hypertension, stroke, coronary artery disease, dyslipidemia, Type II diabetes, depression, sleep apnea, cancers of the colon, breast and endometrium, obstructive sleep apnea, osteoarthritis and female infertility. Therefore, obesity is not just a problem; it s a disease that warrants serious evidence based treatements.  Recommended regular aerobic exercise, recommended improved diet aiming at lowering amount of processed foods, lower sugars, and lower wheat products, and moderation carbs and fat in the diet, establishing more regular meal times, always eating breakfast, front loading some of the calories and adding more protein to the diet.    Discussed the increased risks of developing or worsening all types of diabetes and other dysmetabolic syndromes.    Surgical therapy may be considered, especially in patients with BMI greater than or equal to 35 kg/m2 with multiple complications. Surgical treatments include lap-band, gastric sleeve or gastric bypass surgery.     Plan: Lipid panel reflex to direct LDL Fasting, CBC          with platelets, Comprehensive metabolic panel            (I10) Essential hypertension, benign  Comment: This condition is currently controlled on the current medical regimen.  Continue current therapy.   Discussed current hypertension treatment guidelines, including indications for treatment and treatment options.  Discussed the importance for aggressive management of HTN to prevent vascular complications later.  Recommended lower fat, lower carbohydrate, and lower sodium (<2000 mg)diet.  Discussed required intervals for follow up on HTN, lab studies.  Recommened pt. follow their blood pressures outside the clinic to ensure that BPs are remaining within guidelines, and to contact me if the readings are not within guidelines on a regular basis so we can adjust treatment as needed.   Plan: lisinopril-hydrochlorothiazide         (PRINZIDE/ZESTORETIC) 20-12.5 MG tablet,         ASPIRIN NOT PRESCRIBED (INTENTIONAL), CBC with         platelets, Comprehensive metabolic panel            (E78.5) Hyperlipidemia with target LDL less than 130  Comment: This condition is currently controlled on the current medical regimen.  Continue current therapy.  Discussed current lipid results, previous results (if available) current guidelines (NCEP) for treatment and goals for lipids.  Discussed lifestyle modification, dietary changes (low fat, low simple carb) and regular aerobic exercise.  Discussed the link between dysmetabolic syndrome and impaired glucose tolerance seen in certain patterns of lipids.  Briefly discussed medication used for lipid lowering, including the statins are their possible side effects of myalgias, rhabdomyolysis, and liver toxicity.   Plan: atorvastatin (LIPITOR) 20 MG tablet, ASPIRIN         NOT PRESCRIBED (INTENTIONAL), Lipid panel         reflex to direct LDL Fasting, Comprehensive         metabolic panel            (D50.9) Iron deficiency anemia, unspecified iron deficiency anemia type  Comment: Patient  work-up, most likely due to gastritis.  I asked her not to take any aspirin tablets until further notice due to risk of future anemia.  Hemoglobin almost back to normal.     Plan:     (R05) Cough  Comment: OK for occ use.   Plan: albuterol (PROAIR HFA/PROVENTIL HFA/VENTOLIN         HFA) 108 (90 Base) MCG/ACT inhaler          (A04.8) H. pylori infection  Comment: reviedwed EGD biopsy results.   She was given 10 day course of triple therapy.   Unclear if she completed follow-up testing to confirm eradication.  Plan: H. pylori stool antigen testing if she cannot complete follow-up stool testing.       See Patient Instructions    LANEY SUMMERS M.D., MD  [facility]    (Chart documentation may have been completed, in part, with [employer] voice-recognition software. Even though reviewed, some grammatical, spelling, and word errors may remain.)      17-Oct-2024 17:17

## 2024-10-17 NOTE — ED ADULT NURSE NOTE - NSFALLUNIVINTERV_ED_ALL_ED
Bed/Stretcher in lowest position, wheels locked, appropriate side rails in place/Call bell, personal items and telephone in reach/Instruct patient to call for assistance before getting out of bed/chair/stretcher/Non-slip footwear applied when patient is off stretcher/[place] to call system/Physically safe environment - no spills, clutter or unnecessary equipment/Purposeful proactive rounding/Room/bathroom lighting operational, light cord in reach

## 2024-10-17 NOTE — ED PROVIDER NOTE - NSFOLLOWUPCLINICS_GEN_ALL_ED_FT
[facility]  Internal Medicine  [address] [place] [address]  Phone: [phone]  Fax: [phone]  Follow Up Time: Routine

## 2024-10-17 NOTE — ED ADULT TRIAGE NOTE - CHIEF COMPLAINT QUOTE
c/o  headache, blurred vision, nausea, numbness to left side of face and neck pain yesterday 9;30 AM,

## 2024-10-17 NOTE — ED PROVIDER NOTE - CLINICAL SUMMARY MEDICAL DECISION MAKING FREE TEXT BOX
This patient presents with a headache most consistent with benign headache from either tension type headache vs migraine. No headache red flags. Neurologic exam without evidence of meningismus, AMS, focal neurologic findings so doubt meningitis, encephalitis, stroke. Presentation not consistent with acute intracranial bleed to include SAH (lack of risk factors, headache history). No history of trauma so doubt ICH. Given history and physical temporal arteritis unlikely, as is acute angle closure glaucoma. Doubt carotid artery dissection given no focal neuro deficits, no neck trauma or recent neck strain. Patient with no signs of increased intracranial pressure or weight loss and history and physical suggest more benign headache so less likely mass effect in brain from tumor or abscess or idiopathic intracranial hypertension. Pain was controlled with headache cocktail and patient discharged home with PMD follow up

## 2024-10-17 NOTE — ED PROVIDER NOTE - PATIENT PORTAL LINK FT
You can access the FollowMyHealth Patient Portal offered by [facility] by registering at the following website: [email or web address]. By joining [employer]’s FollowMyHealth portal, you will also be able to view your health information using other applications (apps) compatible with our system.

## 2024-10-17 NOTE — ED PROVIDER NOTE - OBJECTIVE STATEMENT
58 male presents to the ED reporting headache since yesterday.  Reports left-sided pain in his head neck and eye.  Reports throbbing sensation.  Denies numbness weakness change in vision chest pain difficulty breathing nausea vomiting or diarrhea.    GENERAL APPEARANCE:  AAOx3, generally well-appearing, no acute distress. Appears stated age.  HEENT:  NCAT. Moist mucous membranes. EOMI, clear conjunctiva, no slceral icterus, oropharynx clear.  NECK:  Supple without lymphadenopathy. No JVD.  No neck stiffness or restricted ROM.  HEART:  Normal heart rate and regular rhythm. No murmur.   LUNGS:  CTAB, moving air well. No crackles or wheezes are heard.  ABDOMEN:  Soft, nontender, non-distended. Negative Solis. Negative McBurney. No rebound or guarding.  CHEST/BACK: Chest wall non-tender. No CVAT, or midline cervical/thoracic/lumbar tenderness to palpation. No obvious deformity of chest wall or back.  EXTREMITIES:  Without cyanosis, clubbing or edema. Pulse intact x 4. FROM x4. Compartments soft in all extremities.  NEUROLOGICAL:  Grossly non-focal. Alert and oriented, moving all 4 extremities. CN II-XII grossly intact. Observed to ambulate with normal gait.  SKIN:  Warm and dry without any rash.  PSYCH: Calm, cooperative. Normal mood and affect. Demonstrates proper insight and judgement.

## 2024-10-18 ENCOUNTER — INPATIENT (INPATIENT)
Facility: HOSPITAL | Age: 59
LOS: 5 days | Discharge: HOME | DRG: 93 | End: 2024-10-24
Attending: STUDENT IN AN ORGANIZED HEALTH CARE EDUCATION/TRAINING PROGRAM | Admitting: STUDENT IN AN ORGANIZED HEALTH CARE EDUCATION/TRAINING PROGRAM
Payer: MEDICAID

## 2024-10-18 VITALS
DIASTOLIC BLOOD PRESSURE: 94 MMHG | HEIGHT: 61 IN | OXYGEN SATURATION: 98 % | TEMPERATURE: 98 F | SYSTOLIC BLOOD PRESSURE: 156 MMHG | HEART RATE: 82 BPM | RESPIRATION RATE: 16 BRPM | WEIGHT: 154.32 LBS

## 2024-10-18 DIAGNOSIS — R20.0 ANESTHESIA OF SKIN: ICD-10-CM

## 2024-10-18 DIAGNOSIS — K21.9 GASTRO-ESOPHAGEAL REFLUX DISEASE WITHOUT ESOPHAGITIS: ICD-10-CM

## 2024-10-18 DIAGNOSIS — I63.9 CEREBRAL INFARCTION, UNSPECIFIED: ICD-10-CM

## 2024-10-18 DIAGNOSIS — Z29.9 ENCOUNTER FOR PROPHYLACTIC MEASURES, UNSPECIFIED: ICD-10-CM

## 2024-10-18 DIAGNOSIS — J45.909 UNSPECIFIED ASTHMA, UNCOMPLICATED: ICD-10-CM

## 2024-10-18 DIAGNOSIS — K46.9 UNSPECIFIED ABDOMINAL HERNIA WITHOUT OBSTRUCTION OR GANGRENE: Chronic | ICD-10-CM

## 2024-10-18 LAB
ALBUMIN SERPL ELPH-MCNC: 4 G/DL — SIGNIFICANT CHANGE UP (ref 3.5–5)
ALP SERPL-CCNC: 106 U/L — SIGNIFICANT CHANGE UP (ref 40–120)
ALT FLD-CCNC: 51 U/L DA — SIGNIFICANT CHANGE UP (ref 10–60)
ANION GAP SERPL CALC-SCNC: 10 MMOL/L — SIGNIFICANT CHANGE UP (ref 5–17)
APTT BLD: 35.3 SEC — SIGNIFICANT CHANGE UP (ref 24.5–35.6)
AST SERPL-CCNC: 28 U/L — SIGNIFICANT CHANGE UP (ref 10–40)
BASOPHILS # BLD AUTO: 0.02 K/UL — SIGNIFICANT CHANGE UP (ref 0–0.2)
BASOPHILS NFR BLD AUTO: 0.3 % — SIGNIFICANT CHANGE UP (ref 0–2)
BILIRUB SERPL-MCNC: 0.3 MG/DL — SIGNIFICANT CHANGE UP (ref 0.2–1.2)
BUN SERPL-MCNC: 22 MG/DL — HIGH (ref 7–18)
CALCIUM SERPL-MCNC: 9.2 MG/DL — SIGNIFICANT CHANGE UP (ref 8.4–10.5)
CHLORIDE SERPL-SCNC: 105 MMOL/L — SIGNIFICANT CHANGE UP (ref 96–108)
CO2 SERPL-SCNC: 24 MMOL/L — SIGNIFICANT CHANGE UP (ref 22–31)
CREAT SERPL-MCNC: 0.86 MG/DL — SIGNIFICANT CHANGE UP (ref 0.5–1.3)
EGFR: 99 ML/MIN/1.73M2 — SIGNIFICANT CHANGE UP
EOSINOPHIL # BLD AUTO: 0.13 K/UL — SIGNIFICANT CHANGE UP (ref 0–0.5)
EOSINOPHIL NFR BLD AUTO: 2.1 % — SIGNIFICANT CHANGE UP (ref 0–6)
GLUCOSE SERPL-MCNC: 117 MG/DL — HIGH (ref 70–99)
HCT VFR BLD CALC: 49.8 % — SIGNIFICANT CHANGE UP (ref 39–50)
HGB BLD-MCNC: 17.1 G/DL — HIGH (ref 13–17)
IMM GRANULOCYTES NFR BLD AUTO: 0.3 % — SIGNIFICANT CHANGE UP (ref 0–0.9)
INR BLD: 0.98 RATIO — SIGNIFICANT CHANGE UP (ref 0.85–1.16)
LYMPHOCYTES # BLD AUTO: 1.99 K/UL — SIGNIFICANT CHANGE UP (ref 1–3.3)
LYMPHOCYTES # BLD AUTO: 32.5 % — SIGNIFICANT CHANGE UP (ref 13–44)
MCHC RBC-ENTMCNC: 32.6 PG — SIGNIFICANT CHANGE UP (ref 27–34)
MCHC RBC-ENTMCNC: 34.3 GM/DL — SIGNIFICANT CHANGE UP (ref 32–36)
MCV RBC AUTO: 94.9 FL — SIGNIFICANT CHANGE UP (ref 80–100)
MONOCYTES # BLD AUTO: 0.46 K/UL — SIGNIFICANT CHANGE UP (ref 0–0.9)
MONOCYTES NFR BLD AUTO: 7.5 % — SIGNIFICANT CHANGE UP (ref 2–14)
NEUTROPHILS # BLD AUTO: 3.51 K/UL — SIGNIFICANT CHANGE UP (ref 1.8–7.4)
NEUTROPHILS NFR BLD AUTO: 57.3 % — SIGNIFICANT CHANGE UP (ref 43–77)
NRBC # BLD: 0 /100 WBCS — SIGNIFICANT CHANGE UP (ref 0–0)
PLATELET # BLD AUTO: 279 K/UL — SIGNIFICANT CHANGE UP (ref 150–400)
POTASSIUM SERPL-MCNC: 3.9 MMOL/L — SIGNIFICANT CHANGE UP (ref 3.5–5.3)
POTASSIUM SERPL-SCNC: 3.9 MMOL/L — SIGNIFICANT CHANGE UP (ref 3.5–5.3)
PROT SERPL-MCNC: 7.9 G/DL — SIGNIFICANT CHANGE UP (ref 6–8.3)
PROTHROM AB SERPL-ACNC: 11.5 SEC — SIGNIFICANT CHANGE UP (ref 9.9–13.4)
RBC # BLD: 5.25 M/UL — SIGNIFICANT CHANGE UP (ref 4.2–5.8)
RBC # FLD: 12.6 % — SIGNIFICANT CHANGE UP (ref 10.3–14.5)
SODIUM SERPL-SCNC: 139 MMOL/L — SIGNIFICANT CHANGE UP (ref 135–145)
TROPONIN I, HIGH SENSITIVITY RESULT: 17.6 NG/L — SIGNIFICANT CHANGE UP
WBC # BLD: 6.13 K/UL — SIGNIFICANT CHANGE UP (ref 3.8–10.5)
WBC # FLD AUTO: 6.13 K/UL — SIGNIFICANT CHANGE UP (ref 3.8–10.5)

## 2024-10-18 PROCEDURE — 99221 1ST HOSP IP/OBS SF/LOW 40: CPT

## 2024-10-18 PROCEDURE — 0042T: CPT | Mod: MC

## 2024-10-18 PROCEDURE — 93923 UPR/LXTR ART STDY 3+ LVLS: CPT | Mod: 26

## 2024-10-18 PROCEDURE — 70450 CT HEAD/BRAIN W/O DYE: CPT | Mod: 26,59,MC

## 2024-10-18 PROCEDURE — 99223 1ST HOSP IP/OBS HIGH 75: CPT | Mod: GC

## 2024-10-18 PROCEDURE — 70496 CT ANGIOGRAPHY HEAD: CPT | Mod: 26,MC

## 2024-10-18 PROCEDURE — 70498 CT ANGIOGRAPHY NECK: CPT | Mod: 26,MC

## 2024-10-18 PROCEDURE — 93010 ELECTROCARDIOGRAM REPORT: CPT

## 2024-10-18 PROCEDURE — 99285 EMERGENCY DEPT VISIT HI MDM: CPT

## 2024-10-18 RX ORDER — ENOXAPARIN SODIUM 40MG/0.4ML
40 SYRINGE (ML) SUBCUTANEOUS EVERY 24 HOURS
Refills: 0 | Status: DISCONTINUED | OUTPATIENT
Start: 2024-10-18 | End: 2024-10-21

## 2024-10-18 RX ORDER — DICYCLOMINE HYDROCHLORIDE 20 MG/1
2 TABLET ORAL
Refills: 0 | DISCHARGE

## 2024-10-18 RX ORDER — ASPIRIN/MAG CARB/ALUMINUM AMIN 325 MG
81 TABLET ORAL DAILY
Refills: 0 | Status: DISCONTINUED | OUTPATIENT
Start: 2024-10-18 | End: 2024-10-18

## 2024-10-18 RX ORDER — ACETAMINOPHEN 500 MG
650 TABLET ORAL ONCE
Refills: 0 | Status: COMPLETED | OUTPATIENT
Start: 2024-10-18 | End: 2024-10-18

## 2024-10-18 RX ORDER — DEXLANSOPRAZOLE 60 MG/1
1 CAPSULE, DELAYED RELEASE ORAL
Refills: 0 | DISCHARGE

## 2024-10-18 RX ORDER — ACETAMINOPHEN 500 MG
650 TABLET ORAL EVERY 6 HOURS
Refills: 0 | Status: DISCONTINUED | OUTPATIENT
Start: 2024-10-18 | End: 2024-10-18

## 2024-10-18 RX ORDER — MELATONIN 5 MG
3 TABLET ORAL AT BEDTIME
Refills: 0 | Status: DISCONTINUED | OUTPATIENT
Start: 2024-10-18 | End: 2024-10-18

## 2024-10-18 RX ORDER — CETIRIZINE HCL 10 MG/1
1 TABLET ORAL
Refills: 0 | DISCHARGE

## 2024-10-18 RX ORDER — ASPIRIN/MAG CARB/ALUMINUM AMIN 325 MG
325 TABLET ORAL ONCE
Refills: 0 | Status: COMPLETED | OUTPATIENT
Start: 2024-10-18 | End: 2024-10-18

## 2024-10-18 RX ORDER — PANTOPRAZOLE SODIUM 40 MG/1
40 TABLET, DELAYED RELEASE ORAL
Refills: 0 | Status: DISCONTINUED | OUTPATIENT
Start: 2024-10-18 | End: 2024-10-24

## 2024-10-18 RX ORDER — ASPIRIN/MAG CARB/ALUMINUM AMIN 325 MG
81 TABLET ORAL DAILY
Refills: 0 | Status: DISCONTINUED | OUTPATIENT
Start: 2024-10-19 | End: 2024-10-21

## 2024-10-18 RX ORDER — ASPIRIN/MAG CARB/ALUMINUM AMIN 325 MG
325 TABLET ORAL ONCE
Refills: 0 | Status: DISCONTINUED | OUTPATIENT
Start: 2024-10-18 | End: 2024-10-18

## 2024-10-18 RX ORDER — METOCLOPRAMIDE HCL 10 MG
10 TABLET ORAL ONCE
Refills: 0 | Status: COMPLETED | OUTPATIENT
Start: 2024-10-18 | End: 2024-10-18

## 2024-10-18 RX ORDER — CLOPIDOGREL 75 MG/1
75 TABLET ORAL DAILY
Refills: 0 | Status: DISCONTINUED | OUTPATIENT
Start: 2024-10-18 | End: 2024-10-21

## 2024-10-18 RX ORDER — ACETAMINOPHEN 500 MG
1000 TABLET ORAL ONCE
Refills: 0 | Status: COMPLETED | OUTPATIENT
Start: 2024-10-18 | End: 2024-10-18

## 2024-10-18 RX ADMIN — Medication 10 MILLIGRAM(S): at 17:58

## 2024-10-18 RX ADMIN — Medication 40 MILLIGRAM(S): at 22:25

## 2024-10-18 RX ADMIN — Medication 1000 MILLIGRAM(S): at 18:56

## 2024-10-18 RX ADMIN — Medication 400 MILLIGRAM(S): at 18:26

## 2024-10-18 RX ADMIN — Medication 325 MILLIGRAM(S): at 17:57

## 2024-10-18 NOTE — H&P ADULT - PROBLEM SELECTOR PLAN 1
P/w   LKW 7 am   Vitals: Temp 98.1, HR 82, /94  CT brain perfusion neg  CTH neg   s/p reglan   Complaining of  Code stroke NIHSS   6  called in ED  EKG showing NSR   dysphagia screen -   -ASA, plavix 75 mg for 3 weeks, atorvastatin 80 mg   -admit to telemetry  -f/u a1c and  lipids  -f/u TTE  -PT consult  Neurology consulted: Dr. Wetzel P/w Left  sided facial droop and rigth sided weakness   LKW 7 am   Vitals: Temp 98.1, HR 82, /94  CT brain perfusion neg  CTH neg   s/p reglan   Code stroke NIHSS   6  called in ED  EKG showing NSR   passed dysphagia screen -   -ASA, plavix 75 mg for 3 weeks, atorvastatin 80 mg   -admit to telemetry  -f/u a1c and  lipids  -f/u TTE  -f/u PT consult  Neurology consulted: Dr. Wetzel P/w Left  sided facial droop and rigth sided weakness   LKW 7 am   Vitals: Temp 98.1, HR 82, /94  CT brain perfusion neg  CTH neg   s/p reglan   s/p aspirin 325   Code stroke NIHSS   6  called in ED  EKG showing NSR   passed dysphagia screen -   -ASA, plavix 75 mg for 3 weeks, atorvastatin 80 mg   -admit to telemetry  -f/u a1c and  lipids  -f/u TTE  -f/u PT consult  f/u SHWETA   Neurology consulted: Dr. Wetzel

## 2024-10-18 NOTE — ED PROVIDER NOTE - OBJECTIVE STATEMENT
58-year-old male, past medical history significant for asthma, presenting with chief complaint of right-sided numbness.  Patient states that he noticed this morning at 7 AM, he had numbness from the right elbow to his right hand, as well as  to his right lower extremity, from the right knee to the right foot.  Patient states that now, numbness has progressed to involve his entire right side of his body including his face.  Patient has been able to ambulate.  He does endorse some headache as well as nausea.  Otherwise, denying any neck pain, chest pain, shortness of breath. NIHSS 6     used ID #[id number]

## 2024-10-18 NOTE — ED ADULT NURSE NOTE - NURSING MUSC HAND GRASP
Patient Seen in: [facility] Emergency Department    History   Patient presents with:  Ear Problem Pain  Dizziness      HPI    Patient presents to the ED after waking from a nap and feeling as if both of her ears were \"muffled\".   She states that platelets on prednisone\"   • Pregnancy 2006    per Next[employer]:  \"Pregnancy; Management:  ; Outcome/detail:  39 week 7 lb(s) 12 oz Male\"   • Pregnancy     per NextGen:  \"Pregnancy; Management:  induced \"   • Pregnancy     per Next[employer]: systems are reviewed and are negative. Constitutional and vital signs reviewed. Social History and Family History elements reviewed from today, pertinent positives to the presenting problem noted.     Physical Exam     ED Triage Vitals [09/07/20 145 reviewed. ED Course      Labs Reviewed - No data to display      Imaging Results Available and Reviewed while in ED: No results found.   ED Medications Administered: Medications - No data to display      [facility]      09/07/20  1459   BP: 99/65   Pulse: 105 PM    START taking these medications    methylPREDNISolone 4 MG Oral Tablet Therapy Pack  Dosepack: use as directed on packaging, Normal, Disp-1 Package, R-0    cyclobenzaprine 10 MG Oral Tab  Take 1 tablet (10 mg total) by mouth 3 (three) times daily as n strong left, weak right

## 2024-10-18 NOTE — ED ADULT NURSE NOTE - NSFALLRISKINTERV_ED_ALL_ED

## 2024-10-18 NOTE — PATIENT PROFILE ADULT - FALL HARM RISK - HARM RISK INTERVENTIONS

## 2024-10-18 NOTE — ED ADULT NURSE NOTE - NS ED NURSE REPORT GIVEN DT
Arava Pregnancy And Lactation Text: This medication is Pregnancy Category X and is absolutely contraindicated during pregnancy. It is unknown if it is excreted in breast milk. Cosentyx Counseling:  I discussed with the patient the risks of Cosentyx including but not limited to worsening of Crohn's disease, immunosuppression, allergic reactions and infections.  The patient understands that monitoring is required including a PPD at baseline and must alert us or the primary physician if symptoms of infection or other concerning signs are noted. Cyclophosphamide Pregnancy And Lactation Text: This medication is Pregnancy Category D and it isn't considered safe during pregnancy. This medication is excreted in breast milk. Acitretin Counseling:  I discussed with the patient the risks of acitretin including but not limited to hair loss, dry lips/skin/eyes, liver damage, hyperlipidemia, depression/suicidal ideation, photosensitivity.  Serious rare side effects can include but are not limited to pancreatitis, pseudotumor cerebri, bony changes, clot formation/stroke/heart attack.  Patient understands that alcohol is contraindicated since it can result in liver toxicity and significantly prolong the elimination of the drug by many years. Ketoconazole Pregnancy And Lactation Text: This medication is Pregnancy Category C and it isn't know if it is safe during pregnancy. It is also excreted in breast milk and breast feeding isn't recommended. Azelaic Acid Pregnancy And Lactation Text: This medication is considered safe during pregnancy and breast feeding. Drysol Counseling:  I discussed with the patient the risks of drysol/aluminum chloride including but not limited to skin rash, itching, irritation, burning. Skyrizi Pregnancy And Lactation Text: The risk during pregnancy and breastfeeding is uncertain with this medication. Olanzapine Counseling- I discussed with the patient the common side effects of olanzapine including but are not limited to: lack of energy, dry mouth, increased appetite, sleepiness, tremor, constipation, dizziness, changes in behavior, or restlessness.  Explained that teenagers are more likely to experience headaches, abdominal pain, pain in the arms or legs, tiredness, and sleepiness.  Serious side effects include but are not limited: increased risk of death in elderly patients who are confused, have memory loss, or dementia-related psychosis; hyperglycemia; increased cholesterol and triglycerides; and weight gain. Wartpeel Pregnancy And Lactation Text: This medication is Pregnancy Category X and contraindicated in pregnancy and in women who may become pregnant. It is unknown if this medication is excreted in breast milk. Propranolol Pregnancy And Lactation Text: This medication is Pregnancy Category C and it isn't known if it is safe during pregnancy. It is excreted in breast milk. Olumiant Counseling: I discussed with the patient the risks of Olumiant therapy including but not limited to upper respiratory tract infections, shingles, cold sores, and nausea. Live vaccines should be avoided.  This medication has been linked to serious infections; higher rate of mortality; malignancy and lymphoproliferative disorders; major adverse cardiovascular events; thrombosis; gastrointestinal perforations; neutropenia; lymphopenia; anemia; liver enzyme elevations; and lipid elevations. Tazorac Counseling:  Patient advised that medication is irritating and drying.  Patient may need to apply sparingly and wash off after an hour before eventually leaving it on overnight.  The patient verbalized understanding of the proper use and possible adverse effects of tazorac.  All of the patient's questions and concerns were addressed. Erivedge Counseling- I discussed with the patient the risks of Erivedge including but not limited to nausea, vomiting, diarrhea, constipation, weight loss, changes in the sense of taste, decreased appetite, muscle spasms, and hair loss.  The patient verbalized understanding of the proper use and possible adverse effects of Erivedge.  All of the patient's questions and concerns were addressed. Birth Control Pills Pregnancy And Lactation Text: This medication should be avoided if pregnant and for the first 30 days post-partum. Albendazole Counseling:  I discussed with the patient the risks of albendazole including but not limited to cytopenia, kidney damage, nausea/vomiting and severe allergy.  The patient understands that this medication is being used in an off-label manner. Clindamycin Pregnancy And Lactation Text: This medication can be used in pregnancy if certain situations. Clindamycin is also present in breast milk. Cosentyx Pregnancy And Lactation Text: This medication is Pregnancy Category B and is considered safe during pregnancy. It is unknown if this medication is excreted in breast milk. Clofazimine Counseling:  I discussed with the patient the risks of clofazimine including but not limited to skin and eye pigmentation, liver damage, nausea/vomiting, gastrointestinal bleeding and allergy. Glycopyrrolate Pregnancy And Lactation Text: This medication is Pregnancy Category B and is considered safe during pregnancy. It is unknown if it is excreted breast milk. Quinolones Counseling:  I discussed with the patient the risks of fluoroquinolones including but not limited to GI upset, allergic reaction, drug rash, diarrhea, dizziness, photosensitivity, yeast infections, liver function test abnormalities, tendonitis/tendon rupture. Minoxidil Counseling: Minoxidil is a topical medication which can increase blood flow where it is applied. It is uncertain how this medication increases hair growth. Side effects are uncommon and include stinging and allergic reactions. Tetracycline Pregnancy And Lactation Text: This medication is Pregnancy Category D and not consider safe during pregnancy. It is also excreted in breast milk. Cyclosporine Counseling:  I discussed with the patient the risks of cyclosporine including but not limited to hypertension, gingival hyperplasia,myelosuppression, immunosuppression, liver damage, kidney damage, neurotoxicity, lymphoma, and serious infections. The patient understands that monitoring is required including baseline blood pressure, CBC, CMP, lipid panel and uric acid, and then 1-2 times monthly CMP and blood pressure. Picato Pregnancy And Lactation Text: This medication is Pregnancy Category C. It is unknown if this medication is excreted in breast milk. Infliximab Counseling:  I discussed with the patient the risks of infliximab including but not limited to myelosuppression, immunosuppression, autoimmune hepatitis, demyelinating diseases, lymphoma, and serious infections.  The patient understands that monitoring is required including a PPD at baseline and must alert us or the primary physician if symptoms of infection or other concerning signs are noted. Olumiant Pregnancy And Lactation Text: Based on animal studies, Olumiant may cause embryo-fetal harm when administered to pregnant women.  The medication should not be used in pregnancy.  Breastfeeding is not recommended during treatment. Winlevi Counseling:  I discussed with the patient the risks of topical clascoterone including but not limited to erythema, scaling, itching, and stinging. Patient voiced their understanding. 18-Oct-2024 20:43 SSKI Counseling:  I discussed with the patient the risks of SSKI including but not limited to thyroid abnormalities, metallic taste, GI upset, fever, headache, acne, arthralgias, paraesthesias, lymphadenopathy, easy bleeding, arrhythmias, and allergic reaction. Acitretin Pregnancy And Lactation Text: This medication is Pregnancy Category X and should not be given to women who are pregnant or may become pregnant in the future. This medication is excreted in breast milk. Terbinafine Counseling: Patient counseling regarding adverse effects of terbinafine including but not limited to headache, diarrhea, rash, upset stomach, liver function test abnormalities, itching, taste/smell disturbance, nausea, abdominal pain, and flatulence.  There is a rare possibility of liver failure that can occur when taking terbinafine.  The patient understands that a baseline LFT and kidney function test may be required. The patient verbalized understanding of the proper use and possible adverse effects of terbinafine.  All of the patient's questions and concerns were addressed. Olanzapine Pregnancy And Lactation Text: This medication is pregnancy category C.   There are no adequate and well controlled trials with olanzapine in pregnant females.  Olanzapine should be used during pregnancy only if the potential benefit justifies the potential risk to the fetus.   In a study in lactating healthy women, olanzapine was excreted in breast milk.  It is recommended that women taking olanzapine should not breast feed. Stelara Counseling:  I discussed with the patient the risks of ustekinumab including but not limited to immunosuppression, malignancy, posterior leukoencephalopathy syndrome, and serious infections.  The patient understands that monitoring is required including a PPD at baseline and must alert us or the primary physician if symptoms of infection or other concerning signs are noted. Tazorac Pregnancy And Lactation Text: This medication is not safe during pregnancy. It is unknown if this medication is excreted in breast milk. Benzoyl Peroxide Counseling: Patient counseled that medicine may cause skin irritation and bleach clothing.  In the event of skin irritation, the patient was advised to reduce the amount of the drug applied or use it less frequently.   The patient verbalized understanding of the proper use and possible adverse effects of benzoyl peroxide.  All of the patient's questions and concerns were addressed. Protopic Counseling: Patient may experience a mild burning sensation during topical application. Protopic is not approved in children less than 2 years of age. There have been case reports of hematologic and skin malignancies in patients using topical calcineurin inhibitors although causality is questionable. Albendazole Pregnancy And Lactation Text: This medication is Pregnancy Category C and it isn't known if it is safe during pregnancy. It is also excreted in breast milk. 18-Oct-2024 21:02 Hydroxychloroquine Counseling:  I discussed with the patient that a baseline ophthalmologic exam is needed at the start of therapy and every year thereafter while on therapy. A CBC may also be warranted for monitoring.  The side effects of this medication were discussed with the patient, including but not limited to agranulocytosis, aplastic anemia, seizures, rashes, retinopathy, and liver toxicity. Patient instructed to call the office should any adverse effect occur.  The patient verbalized understanding of the proper use and possible adverse effects of Plaquenil.  All the patient's questions and concerns were addressed. Doxycycline Counseling:  Patient counseled regarding possible photosensitivity and increased risk for sunburn.  Patient instructed to avoid sunlight, if possible.  When exposed to sunlight, patients should wear protective clothing, sunglasses, and sunscreen.  The patient was instructed to call the office immediately if the following severe adverse effects occur:  hearing changes, easy bruising/bleeding, severe headache, or vision changes.  The patient verbalized understanding of the proper use and possible adverse effects of doxycycline.  All of the patient's questions and concerns were addressed. Elidel Counseling: Patient may experience a mild burning sensation during topical application. Elidel is not approved in children less than 2 years of age. There have been case reports of hematologic and skin malignancies in patients using topical calcineurin inhibitors although causality is questionable. Cyclosporine Pregnancy And Lactation Text: This medication is Pregnancy Category C and it isn't know if it is safe during pregnancy. This medication is excreted in breast milk. Quinolones Pregnancy And Lactation Text: This medication is Pregnancy Category C and it isn't know if it is safe during pregnancy. It is also excreted in breast milk. Minoxidil Pregnancy And Lactation Text: This medication has not been assigned a Pregnancy Risk Category but animal studies failed to show danger with the topical medication. It is unknown if the medication is excreted in breast milk. Spironolactone Counseling: Patient advised regarding risks of diarrhea, abdominal pain, hyperkalemia, birth defects (for female patients), liver toxicity and renal toxicity. The patient may need blood work to monitor liver and kidney function and potassium levels while on therapy. The patient verbalized understanding of the proper use and possible adverse effects of spironolactone.  All of the patient's questions and concerns were addressed. Terbinafine Pregnancy And Lactation Text: This medication is Pregnancy Category B and is considered safe during pregnancy. It is also excreted in breast milk and breast feeding isn't recommended. Sski Pregnancy And Lactation Text: This medication is Pregnancy Category D and isn't considered safe during pregnancy. It is excreted in breast milk. Winlevi Pregnancy And Lactation Text: This medication is considered safe during pregnancy and breastfeeding. Rinvoq Counseling: I discussed with the patient the risks of Rinvoq therapy including but not limited to upper respiratory tract infections, shingles, cold sores, bronchitis, nausea, cough, fever, acne, and headache. Live vaccines should be avoided.  This medication has been linked to serious infections; higher rate of mortality; malignancy and lymphoproliferative disorders; major adverse cardiovascular events; thrombosis; thrombocytopenia, anemia, and neutropenia; lipid elevations; liver enzyme elevations; and gastrointestinal perforations. Dupixent Counseling: I discussed with the patient the risks of dupilumab including but not limited to eye infection and irritation, cold sores, injection site reactions, worsening of asthma, allergic reactions and increased risk of parasitic infection.  Live vaccines should be avoided while taking dupilumab. Dupilumab will also interact with certain medications such as warfarin and cyclosporine. The patient understands that monitoring is required and they must alert us or the primary physician if symptoms of infection or other concerning signs are noted. Clofazimine Pregnancy And Lactation Text: This medication is Pregnancy Category C and isn't considered safe during pregnancy. It is excreted in breast milk. Bexarotene Counseling:  I discussed with the patient the risks of bexarotene including but not limited to hair loss, dry lips/skin/eyes, liver abnormalities, hyperlipidemia, pancreatitis, depression/suicidal ideation, photosensitivity, drug rash/allergic reactions, hypothyroidism, anemia, leukopenia, infection, cataracts, and teratogenicity.  Patient understands that they will need regular blood tests to check lipid profile, liver function tests, white blood cell count, thyroid function tests and pregnancy test if applicable. Fluconazole Counseling:  Patient counseled regarding adverse effects of fluconazole including but not limited to headache, diarrhea, nausea, upset stomach, liver function test abnormalities, taste disturbance, and stomach pain.  There is a rare possibility of liver failure that can occur when taking fluconazole.  The patient understands that monitoring of LFTs and kidney function test may be required, especially at baseline. The patient verbalized understanding of the proper use and possible adverse effects of fluconazole.  All of the patient's questions and concerns were addressed. Benzoyl Peroxide Pregnancy And Lactation Text: This medication is Pregnancy Category C. It is unknown if benzoyl peroxide is excreted in breast milk. Mirvaso Counseling: Mirvaso is a topical medication which can decrease superficial blood flow where applied. Side effects are uncommon and include stinging, redness and allergic reactions. Rituxan Counseling:  I discussed with the patient the risks of Rituxan infusions. Side effects can include infusion reactions, severe drug rashes including mucocutaneous reactions, reactivation of latent hepatitis and other infections and rarely progressive multifocal leukoencephalopathy.  All of the patient's questions and concerns were addressed. Hydroxychloroquine Pregnancy And Lactation Text: This medication has been shown to cause fetal harm but it isn't assigned a Pregnancy Risk Category. There are small amounts excreted in breast milk. Rifampin Counseling: I discussed with the patient the risks of rifampin including but not limited to liver damage, kidney damage, red-orange body fluids, nausea/vomiting and severe allergy. Protopic Pregnancy And Lactation Text: This medication is Pregnancy Category C. It is unknown if this medication is excreted in breast milk when applied topically. Oral Minoxidil Counseling- I discussed with the patient the risks of oral minoxidil including but not limited to shortness of breath, swelling of the feet or ankles, dizziness, lightheadedness, unwanted hair growth and allergic reaction.  The patient verbalized understanding of the proper use and possible adverse effects of oral minoxidil.  All of the patient's questions and concerns were addressed. Spironolactone Pregnancy And Lactation Text: This medication can cause feminization of the male fetus and should be avoided during pregnancy. The active metabolite is also found in breast milk. Topical Clindamycin Counseling: Patient counseled that this medication may cause skin irritation or allergic reactions.  In the event of skin irritation, the patient was advised to reduce the amount of the drug applied or use it less frequently.   The patient verbalized understanding of the proper use and possible adverse effects of clindamycin.  All of the patient's questions and concerns were addressed. Bexarotene Pregnancy And Lactation Text: This medication is Pregnancy Category X and should not be given to women who are pregnant or may become pregnant. This medication should not be used if you are breast feeding. Colchicine Counseling:  Patient counseled regarding adverse effects including but not limited to stomach upset (nausea, vomiting, stomach pain, or diarrhea).  Patient instructed to limit alcohol consumption while taking this medication.  Colchicine may reduce blood counts especially with prolonged use.  The patient understands that monitoring of kidney function and blood counts may be required, especially at baseline. The patient verbalized understanding of the proper use and possible adverse effects of colchicine.  All of the patient's questions and concerns were addressed. Methotrexate Counseling:  Patient counseled regarding adverse effects of methotrexate including but not limited to nausea, vomiting, abnormalities in liver function tests. Patients may develop mouth sores, rash, diarrhea, and abnormalities in blood counts. The patient understands that monitoring is required including LFT's and blood counts.  There is a rare possibility of scarring of the liver and lung problems that can occur when taking methotrexate. Persistent nausea, loss of appetite, pale stools, dark urine, cough, and shortness of breath should be reported immediately. Patient advised to discontinue methotrexate treatment at least three months before attempting to become pregnant.  I discussed the need for folate supplements while taking methotrexate.  These supplements can decrease side effects during methotrexate treatment. The patient verbalized understanding of the proper use and possible adverse effects of methotrexate.  All of the patient's questions and concerns were addressed. Azithromycin Counseling:  I discussed with the patient the risks of azithromycin including but not limited to GI upset, allergic reaction, drug rash, diarrhea, and yeast infections. Libtayo Counseling- I discussed with the patient the risks of Libtayo including but not limited to nausea, vomiting, diarrhea, and bone or muscle pain.  The patient verbalized understanding of the proper use and possible adverse effects of Libtayo.  All of the patient's questions and concerns were addressed. Doxycycline Pregnancy And Lactation Text: This medication is Pregnancy Category D and not consider safe during pregnancy. It is also excreted in breast milk but is considered safe for shorter treatment courses. Ivermectin Counseling:  Patient instructed to take medication on an empty stomach with a full glass of water.  Patient informed of potential adverse effects including but not limited to nausea, diarrhea, dizziness, itching, and swelling of the extremities or lymph nodes.  The patient verbalized understanding of the proper use and possible adverse effects of ivermectin.  All of the patient's questions and concerns were addressed. Thalidomide Counseling: I discussed with the patient the risks of thalidomide including but not limited to birth defects, anxiety, weakness, chest pain, dizziness, cough and severe allergy. Carac Counseling:  I discussed with the patient the risks of Carac including but not limited to erythema, scaling, itching, weeping, crusting, and pain. Zyclara Counseling:  I discussed with the patient the risks of imiquimod including but not limited to erythema, scaling, itching, weeping, crusting, and pain.  Patient understands that the inflammatory response to imiquimod is variable from person to person and was educated regarded proper titration schedule.  If flu-like symptoms develop, patient knows to discontinue the medication and contact us. Rinvoq Pregnancy And Lactation Text: Based on animal studies, Rinvoq may cause embryo-fetal harm when administered to pregnant women.  The medication should not be used in pregnancy.  Breastfeeding is not recommended during treatment and for 6 days after the last dose. Dupixent Pregnancy And Lactation Text: This medication likely crosses the placenta but the risk for the fetus is uncertain. This medication is excreted in breast milk. Cimetidine Counseling:  I discussed with the patient the risks of Cimetidine including but not limited to gynecomastia, headache, diarrhea, nausea, drowsiness, arrhythmias, pancreatitis, skin rashes, psychosis, bone marrow suppression and kidney toxicity. Topical Clindamycin Pregnancy And Lactation Text: This medication is Pregnancy Category B and is considered safe during pregnancy. It is unknown if it is excreted in breast milk. Taltz Counseling: I discussed with the patient the risks of ixekizumab including but not limited to immunosuppression, serious infections, worsening of inflammatory bowel disease and drug reactions.  The patient understands that monitoring is required including a PPD at baseline and must alert us or the primary physician if symptoms of infection or other concerning signs are noted. Mirvaso Pregnancy And Lactation Text: This medication has not been assigned a Pregnancy Risk Category. It is unknown if the medication is excreted in breast milk. Oral Minoxidil Pregnancy And Lactation Text: This medication should only be used when clearly needed if you are pregnant, attempting to become pregnant or breast feeding. Rifampin Pregnancy And Lactation Text: This medication is Pregnancy Category C and it isn't know if it is safe during pregnancy. It is also excreted in breast milk and should not be used if you are breast feeding. Erythromycin Counseling:  I discussed with the patient the risks of erythromycin including but not limited to GI upset, allergic reaction, drug rash, diarrhea, increase in liver enzymes, and yeast infections. Rhofade Counseling: Rhofade is a topical medication which can decrease superficial blood flow where applied. Side effects are uncommon and include stinging, redness and allergic reactions. Libtayo Pregnancy And Lactation Text: This medication is contraindicated in pregnancy and when breast feeding. Isotretinoin Counseling: Patient should get monthly blood tests, not donate blood, not drive at night if vision affected, not share medication, and not undergo elective surgery for 6 months after tx completed. Side effects reviewed, pt to contact office should one occur. Tranexamic Acid Pregnancy And Lactation Text: It is unknown if this medication is safe during pregnancy or breast feeding. Rituxan Pregnancy And Lactation Text: This medication is Pregnancy Category C and it isn't know if it is safe during pregnancy. It is unknown if this medication is excreted in breast milk but similar antibodies are known to be excreted. Low Dose Naltrexone Counseling- I discussed with the patient the potential risks and side effects of low dose naltrexone including but not limited to: more vivid dreams, headaches, nausea, vomiting, abdominal pain, fatigue, dizziness, and anxiety. Enbrel Counseling:  I discussed with the patient the risks of etanercept including but not limited to myelosuppression, immunosuppression, autoimmune hepatitis, demyelinating diseases, lymphoma, and infections.  The patient understands that monitoring is required including a PPD at baseline and must alert us or the primary physician if symptoms of infection or other concerning signs are noted. Methotrexate Pregnancy And Lactation Text: This medication is Pregnancy Category X and is known to cause fetal harm. This medication is excreted in breast milk. Azathioprine Counseling:  I discussed with the patient the risks of azathioprine including but not limited to myelosuppression, immunosuppression, hepatotoxicity, lymphoma, and infections.  The patient understands that monitoring is required including baseline LFTs, Creatinine, possible TPMP genotyping and weekly CBCs for the first month and then every 2 weeks thereafter.  The patient verbalized understanding of the proper use and possible adverse effects of azathioprine.  All of the patient's questions and concerns were addressed. Azithromycin Pregnancy And Lactation Text: This medication is considered safe during pregnancy and is also secreted in breast milk. Eucrisa Counseling: Patient may experience a mild burning sensation during topical application. Eucrisa is not approved in children less than 2 years of age. Griseofulvin Counseling:  I discussed with the patient the risks of griseofulvin including but not limited to photosensitivity, cytopenia, liver damage, nausea/vomiting and severe allergy.  The patient understands that this medication is best absorbed when taken with a fatty meal (e.g., ice cream or french fries). Otezla Counseling: The side effects of Otezla were discussed with the patient, including but not limited to worsening or new depression, weight loss, diarrhea, nausea, upper respiratory tract infection, and headache. Patient instructed to call the office should any adverse effect occur.  The patient verbalized understanding of the proper use and possible adverse effects of Otezla.  All the patient's questions and concerns were addressed. Topical Ketoconazole Counseling: Patient counseled that this medication may cause skin irritation or allergic reactions.  In the event of skin irritation, the patient was advised to reduce the amount of the drug applied or use it less frequently.   The patient verbalized understanding of the proper use and possible adverse effects of ketoconazole.  All of the patient's questions and concerns were addressed. Sotyktu Counseling:  I discussed the most common side effects of Sotyktu including: common cold, sore throat, sinus infections, cold sores, canker sores, folliculitis, and acne.  I also discussed more serious side effects of Sotyktu including but not limited to: serious allergic reactions; increased risk for infections such as TB; cancers such as lymphomas; rhabdomyolysis and elevated CPK; and elevated triglycerides and liver enzymes.  Odomzo Counseling- I discussed with the patient the risks of Odomzo including but not limited to nausea, vomiting, diarrhea, constipation, weight loss, changes in the sense of taste, decreased appetite, muscle spasms, and hair loss.  The patient verbalized understanding of the proper use and possible adverse effects of Odomzo.  All of the patient's questions and concerns were addressed. Low Dose Naltrexone Pregnancy And Lactation Text: Naltrexone is pregnancy category C.  There have been no adequate and well-controlled studies in pregnant women.  It should be used in pregnancy only if the potential benefit justifies the potential risk to the fetus.   Limited data indicates that naltrexone is minimally excreted into breastmilk. Erythromycin Pregnancy And Lactation Text: This medication is Pregnancy Category B and is considered safe during pregnancy. It is also excreted in breast milk. Sarecycline Counseling: Patient advised regarding possible photosensitivity and discoloration of the teeth, skin, lips, tongue and gums.  Patient instructed to avoid sunlight, if possible.  When exposed to sunlight, patients should wear protective clothing, sunglasses, and sunscreen.  The patient was instructed to call the office immediately if the following severe adverse effects occur:  hearing changes, easy bruising/bleeding, severe headache, or vision changes.  The patient verbalized understanding of the proper use and possible adverse effects of sarecycline.  All of the patient's questions and concerns were addressed. Dutasteride Counseling: Dustasteride Counseling:  I discussed with the patient the risks of use of dutasteride including but not limited to decreased libido, decreased ejaculate volume, and gynecomastia. Women who can become pregnant should not handle medication.  All of the patient's questions and concerns were addressed. Dapsone Counseling: I discussed with the patient the risks of dapsone including but not limited to hemolytic anemia, agranulocytosis, rashes, methemoglobinemia, kidney failure, peripheral neuropathy, headaches, GI upset, and liver toxicity.  Patients who start dapsone require monitoring including baseline LFTs and weekly CBCs for the first month, then every month thereafter.  The patient verbalized understanding of the proper use and possible adverse effects of dapsone.  All of the patient's questions and concerns were addressed. Valtrex Counseling: I discussed with the patient the risks of valacyclovir including but not limited to kidney damage, nausea, vomiting and severe allergy.  The patient understands that if the infection seems to be worsening or is not improving, they are to call. Siliq Counseling:  I discussed with the patient the risks of Siliq including but not limited to new or worsening depression, suicidal thoughts and behavior, immunosuppression, malignancy, posterior leukoencephalopathy syndrome, and serious infections.  The patient understands that monitoring is required including a PPD at baseline and must alert us or the primary physician if symptoms of infection or other concerning signs are noted. There is also a special program designed to monitor depression which is required with Siliq. Prednisone Counseling:  I discussed with the patient the risks of prolonged use of prednisone including but not limited to weight gain, insomnia, osteoporosis, mood changes, diabetes, susceptibility to infection, glaucoma and high blood pressure.  In cases where prednisone use is prolonged, patients should be monitored with blood pressure checks, serum glucose levels and an eye exam.  Additionally, the patient may need to be placed on GI prophylaxis, PCP prophylaxis, and calcium and vitamin D supplementation and/or a bisphosphonate.  The patient verbalized understanding of the proper use and the possible adverse effects of prednisone.  All of the patient's questions and concerns were addressed. Adbry Counseling: I discussed with the patient the risks of tralokinumab including but not limited to eye infection and irritation, cold sores, injection site reactions, worsening of asthma, allergic reactions and increased risk of parasitic infection.  Live vaccines should be avoided while taking tralokinumab. The patient understands that monitoring is required and they must alert us or the primary physician if symptoms of infection or other concerning signs are noted. Sotyktu Pregnancy And Lactation Text: There is insufficient data to evaluate whether or not Sotyktu is safe to use during pregnancy.   It is not known if Sotyktu passes into breast milk and whether or not it is safe to use when breastfeeding.   Azathioprine Pregnancy And Lactation Text: This medication is Pregnancy Category D and isn't considered safe during pregnancy. It is unknown if this medication is excreted in breast milk. Tranexamic Acid Counseling:  Patient advised of the small risk of bleeding problems with tranexamic acid. They were also instructed to call if they developed any nausea, vomiting or diarrhea. All of the patient's questions and concerns were addressed. Griseofulvin Pregnancy And Lactation Text: This medication is Pregnancy Category X and is known to cause serious birth defects. It is unknown if this medication is excreted in breast milk but breast feeding should be avoided. Isotretinoin Pregnancy And Lactation Text: This medication is Pregnancy Category X and is considered extremely dangerous during pregnancy. It is unknown if it is excreted in breast milk. Detail Level: Detailed Bactrim Counseling:  I discussed with the patient the risks of sulfa antibiotics including but not limited to GI upset, allergic reaction, drug rash, diarrhea, dizziness, photosensitivity, and yeast infections.  Rarely, more serious reactions can occur including but not limited to aplastic anemia, agranulocytosis, methemoglobinemia, blood dyscrasias, liver or kidney failure, lung infiltrates or desquamative/blistering drug rashes. Calcipotriene Counseling:  I discussed with the patient the risks of calcipotriene including but not limited to erythema, scaling, itching, and irritation. Doxepin Counseling:  Patient advised that the medication is sedating and not to drive a car after taking this medication. Patient informed of potential adverse effects including but not limited to dry mouth, urinary retention, and blurry vision.  The patient verbalized understanding of the proper use and possible adverse effects of doxepin.  All of the patient's questions and concerns were addressed. Tremfya Counseling: I discussed with the patient the risks of guselkumab including but not limited to immunosuppression, serious infections, and drug reactions.  The patient understands that monitoring is required including a PPD at baseline and must alert us or the primary physician if symptoms of infection or other concerning signs are noted. Otezla Pregnancy And Lactation Text: This medication is Pregnancy Category C and it isn't known if it is safe during pregnancy. It is unknown if it is excreted in breast milk. Solaraze Counseling:  I discussed with the patient the risks of Solaraze including but not limited to erythema, scaling, itching, weeping, crusting, and pain. Metronidazole Counseling:  I discussed with the patient the risks of metronidazole including but not limited to seizures, nausea/vomiting, a metallic taste in the mouth, nausea/vomiting and severe allergy. Hydroquinone Counseling:  Patient advised that medication may result in skin irritation, lightening (hypopigmentation), dryness, and burning.  In the event of skin irritation, the patient was advised to reduce the amount of the drug applied or use it less frequently.  Rarely, spots that are treated with hydroquinone can become darker (pseudoochronosis).  Should this occur, patient instructed to stop medication and call the office. The patient verbalized understanding of the proper use and possible adverse effects of hydroquinone.  All of the patient's questions and concerns were addressed. Niacinamide Counseling: I recommended taking niacin or niacinamide, also know as vitamin B3, twice daily. Recent evidence suggests that taking vitamin B3 (500 mg twice daily) can reduce the risk of actinic keratoses and non-melanoma skin cancers. Side effects of vitamin B3 include flushing and headache. Opioid Counseling: I discussed with the patient the potential side effects of opioids including but not limited to addiction, altered mental status, and depression. I stressed avoiding alcohol, benzodiazepines, muscle relaxants and sleep aids unless specifically okayed by a physician. The patient verbalized understanding of the proper use and possible adverse effects of opioids. All of the patient's questions and concerns were addressed. They were instructed to flush the remaining pills down the toilet if they did not need them for pain. Valtrex Pregnancy And Lactation Text: this medication is Pregnancy Category B and is considered safe during pregnancy. This medication is not directly found in breast milk but it's metabolite acyclovir is present. Bactrim Pregnancy And Lactation Text: This medication is Pregnancy Category D and is known to cause fetal risk.  It is also excreted in breast milk. Calcipotriene Pregnancy And Lactation Text: This medication has not been proven safe during pregnancy. It is unknown if this medication is excreted in breast milk. Dutasteride Pregnancy And Lactation Text: This medication is absolutely contraindicated in women, especially during pregnancy and breast feeding. Feminization of male fetuses is possible if taking while pregnant. Xeljanz Counseling: I discussed with the patient the risks of Xeljanz therapy including increased risk of infection, liver issues, headache, diarrhea, or cold symptoms. Live vaccines should be avoided. They were instructed to call if they have any problems. High Dose Vitamin A Counseling: Side effects reviewed, pt to contact office should one occur. Cellcept Counseling:  I discussed with the patient the risks of mycophenolate mofetil including but not limited to infection/immunosuppression, GI upset, hypokalemia, hypercholesterolemia, bone marrow suppression, lymphoproliferative disorders, malignancy, GI ulceration/bleed/perforation, colitis, interstitial lung disease, kidney failure, progressive multifocal leukoencephalopathy, and birth defects.  The patient understands that monitoring is required including a baseline creatinine and regular CBC testing. In addition, patient must alert us immediately if symptoms of infection or other concerning signs are noted. Humira Counseling:  I discussed with the patient the risks of adalimumab including but not limited to myelosuppression, immunosuppression, autoimmune hepatitis, demyelinating diseases, lymphoma, and serious infections.  The patient understands that monitoring is required including a PPD at baseline and must alert us or the primary physician if symptoms of infection or other concerning signs are noted. Dapsone Pregnancy And Lactation Text: This medication is Pregnancy Category C and is not considered safe during pregnancy or breast feeding. Adbry Pregnancy And Lactation Text: It is unknown if this medication will adversely affect pregnancy or breast feeding. Oxybutynin Counseling:  I discussed with the patient the risks of oxybutynin including but not limited to skin rash, drowsiness, dry mouth, difficulty urinating, and blurred vision. Itraconazole Counseling:  I discussed with the patient the risks of itraconazole including but not limited to liver damage, nausea/vomiting, neuropathy, and severe allergy.  The patient understands that this medication is best absorbed when taken with acidic beverages such as non-diet cola or ginger ale.  The patient understands that monitoring is required including baseline LFTs and repeat LFTs at intervals.  The patient understands that they are to contact us or the primary physician if concerning signs are noted. Simponi Counseling:  I discussed with the patient the risks of golimumab including but not limited to myelosuppression, immunosuppression, autoimmune hepatitis, demyelinating diseases, lymphoma, and serious infections.  The patient understands that monitoring is required including a PPD at baseline and must alert us or the primary physician if symptoms of infection or other concerning signs are noted. Opioid Pregnancy And Lactation Text: These medications can lead to premature delivery and should be avoided during pregnancy. These medications are also present in breast milk in small amounts. Niacinamide Pregnancy And Lactation Text: These medications are considered safe during pregnancy. Solaraze Pregnancy And Lactation Text: This medication is Pregnancy Category B and is considered safe. There is some data to suggest avoiding during the third trimester. It is unknown if this medication is excreted in breast milk. Doxepin Pregnancy And Lactation Text: This medication is Pregnancy Category C and it isn't known if it is safe during pregnancy. It is also excreted in breast milk and breast feeding isn't recommended. Topical Sulfur Applications Counseling: Topical Sulfur Counseling: Patient counseled that this medication may cause skin irritation or allergic reactions.  In the event of skin irritation, the patient was advised to reduce the amount of the drug applied or use it less frequently.   The patient verbalized understanding of the proper use and possible adverse effects of topical sulfur application.  All of the patient's questions and concerns were addressed. High Dose Vitamin A Pregnancy And Lactation Text: High dose vitamin A therapy is contraindicated during pregnancy and breast feeding. Opzelura Counseling:  I discussed with the patient the risks of Opzelura including but not limited to nasopharngitis, bronchitis, ear infection, eosinophila, hives, diarrhea, folliculitis, tonsillitis, and rhinorrhea.  Taken orally, this medication has been linked to serious infections; higher rate of mortality; malignancy and lymphoproliferative disorders; major adverse cardiovascular events; thrombosis; thrombocytopenia, anemia, and neutropenia; and lipid elevations. Gabapentin Counseling: I discussed with the patient the risks of gabapentin including but not limited to dizziness, somnolence, fatigue and ataxia. Cephalexin Counseling: I counseled the patient regarding use of cephalexin as an antibiotic for prophylactic and/or therapeutic purposes. Cephalexin (commonly prescribed under brand name Keflex) is a cephalosporin antibiotic which is active against numerous classes of bacteria, including most skin bacteria. Side effects may include nausea, diarrhea, gastrointestinal upset, rash, hives, yeast infections, and in rare cases, hepatitis, kidney disease, seizures, fever, confusion, neurologic symptoms, and others. Patients with severe allergies to penicillin medications are cautioned that there is about a 10% incidence of cross-reactivity with cephalosporins. When possible, patients with penicillin allergies should use alternatives to cephalosporins for antibiotic therapy. Metronidazole Pregnancy And Lactation Text: This medication is Pregnancy Category B and considered safe during pregnancy.  It is also excreted in breast milk. Finasteride Counseling:  I discussed with the patient the risks of use of finasteride including but not limited to decreased libido, decreased ejaculate volume, gynecomastia, and depression. Women should not handle medication.  All of the patient's questions and concerns were addressed. Include Pregnancy/Lactation Warning?: No 5-Fu Counseling: 5-Fluorouracil Counseling:  I discussed with the patient the risks of 5-fluorouracil including but not limited to erythema, scaling, itching, weeping, crusting, and pain. Cimzia Counseling:  I discussed with the patient the risks of Cimzia including but not limited to immunosuppression, allergic reactions and infections.  The patient understands that monitoring is required including a PPD at baseline and must alert us or the primary physician if symptoms of infection or other concerning signs are noted. Cibinqo Counseling: I discussed with the patient the risks of Cibinqo therapy including but not limited to common cold, nausea, headache, cold sores, increased blood CPK levels, dizziness, UTIs, fatigue, acne, and vomitting. Live vaccines should be avoided.  This medication has been linked to serious infections; higher rate of mortality; malignancy and lymphoproliferative disorders; major adverse cardiovascular events; thrombosis; thrombocytopenia and lymphopenia; lipid elevations; and retinal detachment. Xelmarkusz Pregnancy And Lactation Text: This medication is Pregnancy Category D and is not considered safe during pregnancy.  The risk during breast feeding is also uncertain. Topical Sulfur Applications Pregnancy And Lactation Text: This medication is Pregnancy Category C and has an unknown safety profile during pregnancy. It is unknown if this topical medication is excreted in breast milk. Hydroxyzine Counseling: Patient advised that the medication is sedating and not to drive a car after taking this medication.  Patient informed of potential adverse effects including but not limited to dry mouth, urinary retention, and blurry vision.  The patient verbalized understanding of the proper use and possible adverse effects of hydroxyzine.  All of the patient's questions and concerns were addressed. Xolair Counseling:  Patient informed of potential adverse effects including but not limited to fever, muscle aches, rash and allergic reactions.  The patient verbalized understanding of the proper use and possible adverse effects of Xolair.  All of the patient's questions and concerns were addressed. Minocycline Counseling: Patient advised regarding possible photosensitivity and discoloration of the teeth, skin, lips, tongue and gums.  Patient instructed to avoid sunlight, if possible.  When exposed to sunlight, patients should wear protective clothing, sunglasses, and sunscreen.  The patient was instructed to call the office immediately if the following severe adverse effects occur:  hearing changes, easy bruising/bleeding, severe headache, or vision changes.  The patient verbalized understanding of the proper use and possible adverse effects of minocycline.  All of the patient's questions and concerns were addressed. Imiquimod Counseling:  I discussed with the patient the risks of imiquimod including but not limited to erythema, scaling, itching, weeping, crusting, and pain.  Patient understands that the inflammatory response to imiquimod is variable from person to person and was educated regarded proper titration schedule.  If flu-like symptoms develop, patient knows to discontinue the medication and contact us. Finasteride Pregnancy And Lactation Text: This medication is absolutely contraindicated during pregnancy. It is unknown if it is excreted in breast milk. Nsaids Counseling: NSAID Counseling: I discussed with the patient that NSAIDs should be taken with food. Prolonged use of NSAIDs can result in the development of stomach ulcers.  Patient advised to stop taking NSAIDs if abdominal pain occurs.  The patient verbalized understanding of the proper use and possible adverse effects of NSAIDs.  All of the patient's questions and concerns were addressed. Topical Retinoid counseling:  Patient advised to apply a pea-sized amount only at bedtime and wait 30 minutes after washing their face before applying.  If too drying, patient may add a non-comedogenic moisturizer. The patient verbalized understanding of the proper use and possible adverse effects of retinoids.  All of the patient's questions and concerns were addressed. Opzelura Pregnancy And Lactation Text: There is insufficient data to evaluate drug-associated risk for major birth defects, miscarriage, or other adverse maternal or fetal outcomes.  There is a pregnancy registry that monitors pregnancy outcomes in pregnant persons exposed to the medication during pregnancy.  It is unknown if this medication is excreted in breast milk.  Do not breastfeed during treatment and for about 4 weeks after the last dose. Ilumya Counseling: I discussed with the patient the risks of tildrakizumab including but not limited to immunosuppression, malignancy, posterior leukoencephalopathy syndrome, and serious infections.  The patient understands that monitoring is required including a PPD at baseline and must alert us or the primary physician if symptoms of infection or other concerning signs are noted. Ketoconazole Counseling:   Patient counseled regarding improving absorption with orange juice.  Adverse effects include but are not limited to breast enlargement, headache, diarrhea, nausea, upset stomach, liver function test abnormalities, taste disturbance, and stomach pain.  There is a rare possibility of liver failure that can occur when taking ketoconazole. The patient understands that monitoring of LFTs may be required, especially at baseline. The patient verbalized understanding of the proper use and possible adverse effects of ketoconazole.  All of the patient's questions and concerns were addressed. Cyclophosphamide Counseling:  I discussed with the patient the risks of cyclophosphamide including but not limited to hair loss, hormonal abnormalities, decreased fertility, abdominal pain, diarrhea, nausea and vomiting, bone marrow suppression and infection. The patient understands that monitoring is required while taking this medication. Cephalexin Pregnancy And Lactation Text: This medication is Pregnancy Category B and considered safe during pregnancy.  It is also excreted in breast milk but can be used safely for shorter doses. Azelaic Acid Counseling: Patient counseled that medicine may cause skin irritation and to avoid applying near the eyes.  In the event of skin irritation, the patient was advised to reduce the amount of the drug applied or use it less frequently.   The patient verbalized understanding of the proper use and possible adverse effects of azelaic acid.  All of the patient's questions and concerns were addressed. Xolair Pregnancy And Lactation Text: This medication is Pregnancy Category B and is considered safe during pregnancy. This medication is excreted in breast milk. Propranolol Counseling:  I discussed with the patient the risks of propranolol including but not limited to low heart rate, low blood pressure, low blood sugar, restlessness and increased cold sensitivity. They should call the office if they experience any of these side effects. Hydroxyzine Pregnancy And Lactation Text: This medication is not safe during pregnancy and should not be taken. It is also excreted in breast milk and breast feeding isn't recommended. Wartpeel Counseling:  I discussed with the patient the risks of Wartpeel including but not limited to erythema, scaling, itching, weeping, crusting, and pain. Arava Counseling:  Patient counseled regarding adverse effects of Arava including but not limited to nausea, vomiting, abnormalities in liver function tests. Patients may develop mouth sores, rash, diarrhea, and abnormalities in blood counts. The patient understands that monitoring is required including LFTs and blood counts.  There is a rare possibility of scarring of the liver and lung problems that can occur when taking methotrexate. Persistent nausea, loss of appetite, pale stools, dark urine, cough, and shortness of breath should be reported immediately. Patient advised to discontinue Arava treatment and consult with a physician prior to attempting conception. The patient will have to undergo a treatment to eliminate Arava from the body prior to conception. Cimzia Pregnancy And Lactation Text: This medication crosses the placenta but can be considered safe in certain situations. Cimzia may be excreted in breast milk. Skyrizi Counseling: I discussed with the patient the risks of risankizumab-rzaa including but not limited to immunosuppression, and serious infections.  The patient understands that monitoring is required including a PPD at baseline and must alert us or the primary physician if symptoms of infection or other concerning signs are noted. Nsaids Pregnancy And Lactation Text: These medications are considered safe up to 30 weeks gestation. It is excreted in breast milk. Cibinqo Pregnancy And Lactation Text: It is unknown if this medication will adversely affect pregnancy or breast feeding.  You should not take this medication if you are currently pregnant or planning a pregnancy or while breastfeeding. Clindamycin Counseling: I counseled the patient regarding use of clindamycin as an antibiotic for prophylactic and/or therapeutic purposes. Clindamycin is active against numerous classes of bacteria, including skin bacteria. Side effects may include nausea, diarrhea, gastrointestinal upset, rash, hives, yeast infections, and in rare cases, colitis. Birth Control Pills Counseling: Birth Control Pill Counseling: I discussed with the patient the potential side effects of OCPs including but not limited to increased risk of stroke, heart attack, thrombophlebitis, deep venous thrombosis, hepatic adenomas, breast changes, GI upset, headaches, and depression.  The patient verbalized understanding of the proper use and possible adverse effects of OCPs. All of the patient's questions and concerns were addressed. Glycopyrrolate Counseling:  I discussed with the patient the risks of glycopyrrolate including but not limited to skin rash, drowsiness, dry mouth, difficulty urinating, and blurred vision. Tetracycline Counseling: Patient counseled regarding possible photosensitivity and increased risk for sunburn.  Patient instructed to avoid sunlight, if possible.  When exposed to sunlight, patients should wear protective clothing, sunglasses, and sunscreen.  The patient was instructed to call the office immediately if the following severe adverse effects occur:  hearing changes, easy bruising/bleeding, severe headache, or vision changes.  The patient verbalized understanding of the proper use and possible adverse effects of tetracycline.  All of the patient's questions and concerns were addressed. Patient understands to avoid pregnancy while on therapy due to potential birth defects. Picato Counseling:  I discussed with the patient the risks of Picato including but not limited to erythema, scaling, itching, weeping, crusting, and pain.

## 2024-10-18 NOTE — CONSULT NOTE ADULT - SUBJECTIVE AND OBJECTIVE BOX
NEUROLOGY CONSULT NOTE    NAME:  PATRIA OWEN      ASSESSMENT:        RECOMMENDATIONS:        NOTE TO BE COMPLETED - PLEASE REFER TO ABOVE ONLY AND IGNORE INFORMATION BELOW    *******************************      CHIEF COMPLAINT:  Patient is a 58y old  Male who presents with a chief complaint of CVA (21 Oct 2024 11:44)      HPI:      58-year-old male, AAOx3, Walks independently with past medical history significant for asthma and GERD  presenting with Right-sided numbness.  Patient states that he noticed this morning at 7 AM, he had numbness from the right elbow to his right hand, as well as  to his right lower extremity, from the right knee to the right foot.  Patient states that now, numbness has progressed to involve his entire right side of his body. Patient also reports he has Left sided facial droop and swelling of his left eye.  He does endorse mild headache as well as nausea. But Denies any dizziness, CP, SOB, abdominal pain, vomiting diarrhea, or dysuria . No recent travel or sick contacts.     To note: Patient was here yesterday for Left sided eye swelling but was sent home.   ED course:  Vitals: Temp 98.1, HR 82, /94  CT brain perfusion neg  CTH neg   s/p reglan  (18 Oct 2024 15:29)      NEURO HPI:      PAST MEDICAL & SURGICAL HISTORY:  Asthma  GERD (gastroesophageal reflux disease)  Hernia      MEDICATIONS:  acetaminophen     Tablet .. 650 milliGRAM(s) Oral every 6 hours PRN  aspirin  chewable 324 milliGRAM(s) Oral daily  atorvastatin 80 milliGRAM(s) Oral at bedtime  pantoprazole    Tablet 40 milliGRAM(s) Oral before breakfast  polyethylene glycol 3350 17 Gram(s) Oral daily      ALLERGIES:  penicillin (Hives; Rash)      FAMILY HISTORY:  Family history of irritable colon (Mother)      SOCIAL HISTORY:  Denies alcohol, tobacco, or illicit drug use      REVIEW OF SYSTEMS:  GENERAL: No fever, weight changes, fatigue  EYES: No eye pain or discharge  EAR/NOSE/MOUTH/THROAT: No sinus or throat pain; No difficulty hearing  NECK: No pain or stiffness  RESPIRATORY: No cough, wheezing, chills, or hemoptysis  CARDIOVASCULAR: No chest pain, palpitations, shortness of breath, or dyspnea on exertion  GASTROINTESTINAL: No abdominal pain, nausea, vomiting, hematemesis, diarrhea, or constipation  GENITOURINARY: No dysuria, frequency, hematuria, or incontinence  SKIN: No rashes or lesions  ENDOCRINE: No heat or cold intolerance  HEMATOLOGIC: No easy bruising or bleeding  PSYCHIATRIC: No depression, anxiety, or mood swings  MUSCULOSKELETAL: No joint pain or swelling  NEUROLOGICAL: As per HPI          OBJECTIVE:    Vital Signs Last 24 Hrs  T(C): 36.7 (10-18-24 @ 12:49), Max: 36.7 (10-18-24 @ 12:39)  HR: 80 (10-18-24 @ 13:42) (74 - 82)  BP: 155/90 (10-18-24 @ 12:49) (122/84 - 156/94)  RR: 18 (10-18-24 @ 12:49) (16 - 18)  SpO2: 100% (10-18-24 @ 12:49) (97% - 100%)  Parameters below as of 21 Oct 2024 15:45  Patient On (Oxygen Delivery Method): room air      General Examination:  General: No acute distress  HEENT: Atraumatic, Normocephalic  Respiratory: CTA B/l.  No crackles, rhonchi, or wheezes.  Cardiovascular: RRR.  Normal S1 & S2.  Normal b/l radial and pedal pulses.    Neurological Examination:  General / Mental Status: AAO x 3.  No aphasia or dysarthria.  Naming and repetition intact.  Cranial Nerves: VFF x 4.  PERRL.  EOMI x 2, No nystagmus or diplopia.  B/l V1-V3 equal and intact to light touch and pinprick.  Symmetric facial movement and palate elevation.  B/l hearing equal to finger rub.  5/5 strength with b/l sternocleidomastoid and trapezius.  Midline tongue protrusion, with no atrophy or fasciculations.  Motor: Normal bulk & tone in all four extremities.  5/5 strength throughout all four extremities.  No downward drift, rigidity, spasticity, or tremors in any of the four extremities.  Sensory: Intact to light touch and pinprick in all four extremities.  Negative Romberg.  Reflex: 2+ and symmetric at b/l biceps, triceps, brachioradialis, patellae, and ankles.  Downgoing toes b/l.  Coordination: No dysmetria with b/l finger-to-nose and heel raise tests.  Symmetric rapid alternating movements b/l.  Gait: Normal, narrow-based gait.  No difficulty with tiptoe, heel, and tandem gaits.          LABORATORY VALUES:             General Chemistry:    17-Oct-2024 13:55, Basic Metabolic Panel  Sodium: 140, [135 - 145 mmol/L]  Potassium: 4.0, [3.5 - 5.3 mmol/L]  Chloride:   110, [96 - 108 mmol/L]  Carbon Dioxide: 24, [22 - 31 mmol/L]  Anion Gap: 6, [5 - 17 mmol/L]  Blood Urea Nitrogen:   28, [7 - 18 mg/dL]  Creatinine: 0.93, [0.50 - 1.30 mg/dL]  Glucose: 94, [70 - 99 mg/dL]  Calcium: 9.2, [8.4 - 10.5 mg/dL]  eGFR: 94, [>=60 mL/min/1.73m2], The estimated glomerular filtration rate (eGFR) calculation is based on  	the 2021 CKD-EPI creatinine equation, which is validated in male and  	female population 18 years of age and older (N Engl J Med 2021;  	385:1551-2192).    18-Oct-2024 12:50, Comprehensive Metabolic Panel  Sodium: 139, [135 - 145 mmol/L]  Potassium: 3.9, [3.5 - 5.3 mmol/L]  Chloride: 105, [96 - 108 mmol/L]  Carbon Dioxide: 24, [22 - 31 mmol/L]  Anion Gap: 10, [5 - 17 mmol/L]  Blood Urea Nitrogen:   22, [7 - 18 mg/dL]  Creatinine: 0.86, [0.50 - 1.30 mg/dL]  Glucose:   117, [70 - 99 mg/dL]  Calcium: 9.2, [8.4 - 10.5 mg/dL]  Protein Total: 7.9, [6.0 - 8.3 g/dL]  Albumin: 4.0, [3.5 - 5.0 g/dL]  Bilirubin Total: 0.3, [0.2 - 1.2 mg/dL]  Alkaline Phosphatase: 106, [40 - 120 U/L]  Aspartate Aminotransferase (AST/SGOT): 28, [10 - 40 U/L]  Alanine Aminotransferase (ALT/SGPT): 51, [10 - 60 U/L DA]  eGFR: 99, [>=60 mL/min/1.73m2], The estimated glomerular filtration rate (eGFR) calculation is based on  	the 2021 CKD-EPI creatinine equation, which is validated in male and  	female population 18 years of age and older (N Engl J Med 2021;  	385:9584-1244).    18-Oct-2024 12:50, Troponin I, High Sensitivity  Troponin I, High Sensitivity Result: 17.6, [ - <=78.5 ng/L], Serial measurements of high sensitivity troponin I (hs TnI) showing rapid  	upward or downward changes suggest acute myocardial injury.  Please note  	that a sustained elevation of hsTnI can be caused by renal disease,  	chronic heart failure, sepsis, pulmonary embolism and other clinical  	conditions.  Coagulation:    18-Oct-2024 12:50, Activated Partial Thromboplastin Time  Activated Partial Thromboplastin Time: 35.3, [24.5 - 35.6 sec], The recommended therapeutic heparin range (full dose) is 58-99 seconds.  	Argatroban range is 1.5 to 3.0 times of the baseline APTT value, not to  	exceed 100 seconds.  	Routine coagulation results should be interpreted with caution when  	taking Factor Xa inhibitors or direct thrombin inhibitors; blood sampling  	prior to drug intake is recommended.    18-Oct-2024 12:50, Prothrombin Time and INR, Plasma  Prothrombin Time, Plasma: 11.5, [9.9 - 13.4 sec], Effective September 24, 2024 the reference range has changed.  INR: 0.98, [0.85 - 1.16 ratio], Recommended targets/ranges for therapeutic INR:  	2.0-3.0 Deep vein thrombosis, pulmonary embolism, atrial fibrillation  	2.0-3.0 Mechanical aortic valve, antiphospholipid syndrome with previous  	arterial or venous thromboembolism  	2.5-3.5 Mechanical mitral valve, double mechanical valve (aortic and  	mitral positions, high risk valves)  	Note: Chest 2012 Feb;141(2 Suppl):7S-47S  	Routine coagulation results should be interpreted with caution when  	taking Factor Xa inhibitors or direct thrombin inhibitors; blood sampling  	prior to drug intake is recommended.  Hematology:    17-Oct-2024 13:55, Complete Blood Count  WBC Count: 6.23, [3.80 - 10.50 K/uL]  RBC Count: 4.97, [4.20 - 5.80 M/uL]  Hemoglobin: 16.1, [13.0 - 17.0 g/dL]  Hematocrit: 46.9, [39.0 - 50.0 %]  Mean Cell Volume: 94.4, [80.0 - 100.0 fl]  Mean Cell Hemoglobin: 32.4, [27.0 - 34.0 pg]  Mean Cell Hemoglobin Conc: 34.3, [32.0 - 36.0 gm/dL]  Red Cell Distrib Width: 12.4, [10.3 - 14.5 %]  Platelet Count - Automated: 281, [150 - 400 K/uL]  Nucleated RBC: 0, [0 - 0 /100 WBCs]    18-Oct-2024 12:50, Complete Blood Count + Automated Diff  WBC Count: 6.13, [3.80 - 10.50 K/uL]  RBC Count: 5.25, [4.20 - 5.80 M/uL]  Hemoglobin:   17.1, [13.0 - 17.0 g/dL]  Hematocrit: 49.8, [39.0 - 50.0 %]  Mean Cell Volume: 94.9, [80.0 - 100.0 fl]  Mean Cell Hemoglobin: 32.6, [27.0 - 34.0 pg]  Mean Cell Hemoglobin Conc: 34.3, [32.0 - 36.0 gm/dL]  Red Cell Distrib Width: 12.6, [10.3 - 14.5 %]  Platelet Count - Automated: 279, [150 - 400 K/uL]  Auto Neutrophil #: 3.51, [1.80 - 7.40 K/uL]  Auto Lymphocyte #: 1.99, [1.00 - 3.30 K/uL]  Auto Monocyte #: 0.46, [0.00 - 0.90 K/uL]  Auto Eosinophil #: 0.13, [0.00 - 0.50 K/uL]  Auto Basophil #: 0.02, [0.00 - 0.20 K/uL]  Auto Neutrophil %: 57.3, [43.0 - 77.0 %], Differential percentages must be correlated with absolute numbers for  	clinical significance.  Auto Lymphocyte %: 32.5, [13.0 - 44.0 %]  Auto Monocyte %: 7.5, [2.0 - 14.0 %]  Auto Eosinophil %: 2.1, [0.0 - 6.0 %]  Auto Basophil %: 0.3, [0.0 - 2.0 %]  Auto Immature Granulocyte %: 0.3, [0.0 - 0.9 %], (Includes meta, myelo and promyelocytes). Mild elevations in immature  	granulocytes may be seen with many inflammatory processes and pregnancy;  	clinical correlation suggested.  Nucleated RBC: 0, [0 - 0 /100 WBCs]          NEUROIMAGING:          Please contact the Neurology consult service with any neurological questions.    Zain Wetzel MD   of Neurology  [facility] NEUROLOGY CONSULT NOTE    NAME:  PATRIA OWEN      ASSESSMENT:  58 RHM with acute onset of left facial droop and right-sided sensory deficit, concerning for acute posterior circulation ischemic stroke      RECOMMENDATIONS:    1. Stroke workup  - MRI Brain approved to evaluate for acute intracranial abnormalities (if there are no contraindications)  - Transthoracic Echocardiogram  - Telemetry monitoring while inpatient  - Hemoglobin A1c  - Fasting lipid panel    2. Secondary stroke prevention  - Q4H Neurochecks & Vital signs  - Bedside swallow evaluation before administering any PO meds  - Aspirin 81mg PO Daily (or Aspirin 300mg CA daily if NPO)  - Clopidogrel 75mg PO Daily x 21 days  - Atorvastatin 80mg PO QHS (may adjust dose to achieve goal LDL < 70 mg/dL)  - Treat BP if over 180/110 within first 24 hours of last seen normal; thereafter treat if over 140/90 (goal /80)  - Diabetes management as per primary team  - PT/OT  - DVT ppx: SCDs, Enoxaparin or Heparin          NOTE TO BE COMPLETED - PLEASE REFER TO ABOVE ONLY AND IGNORE INFORMATION BELOW    *******************************      CHIEF COMPLAINT:  Patient is a 58y old  Male who presents with a chief complaint of CVA (21 Oct 2024 11:44)      HPI:      58-year-old male, AAOx3, Walks independently with past medical history significant for asthma and GERD  presenting with Right-sided numbness.  Patient states that he noticed this morning at 7 AM, he had numbness from the right elbow to his right hand, as well as  to his right lower extremity, from the right knee to the right foot.  Patient states that now, numbness has progressed to involve his entire right side of his body. Patient also reports he has Left sided facial droop and swelling of his left eye.  He does endorse mild headache as well as nausea. But Denies any dizziness, CP, SOB, abdominal pain, vomiting diarrhea, or dysuria . No recent travel or sick contacts.     To note: Patient was here yesterday for Left sided eye swelling but was sent home.   ED course:  Vitals: Temp 98.1, HR 82, /94  CT brain perfusion neg  CTH neg   s/p reglan  (18 Oct 2024 15:29)      NEURO HPI:      PAST MEDICAL & SURGICAL HISTORY:  Asthma  GERD (gastroesophageal reflux disease)  Hernia      MEDICATIONS:  acetaminophen     Tablet .. 650 milliGRAM(s) Oral every 6 hours PRN  aspirin  chewable 324 milliGRAM(s) Oral daily  atorvastatin 80 milliGRAM(s) Oral at bedtime  pantoprazole    Tablet 40 milliGRAM(s) Oral before breakfast  polyethylene glycol 3350 17 Gram(s) Oral daily      ALLERGIES:  penicillin (Hives; Rash)      FAMILY HISTORY:  Family history of irritable colon (Mother)      SOCIAL HISTORY:  Denies alcohol, tobacco, or illicit drug use      REVIEW OF SYSTEMS:  GENERAL: No fever, weight changes, fatigue  EYES: No eye pain or discharge  EAR/NOSE/MOUTH/THROAT: No sinus or throat pain; No difficulty hearing  NECK: No pain or stiffness  RESPIRATORY: No cough, wheezing, chills, or hemoptysis  CARDIOVASCULAR: No chest pain, palpitations, shortness of breath, or dyspnea on exertion  GASTROINTESTINAL: No abdominal pain, nausea, vomiting, hematemesis, diarrhea, or constipation  GENITOURINARY: No dysuria, frequency, hematuria, or incontinence  SKIN: No rashes or lesions  ENDOCRINE: No heat or cold intolerance  HEMATOLOGIC: No easy bruising or bleeding  PSYCHIATRIC: No depression, anxiety, or mood swings  MUSCULOSKELETAL: No joint pain or swelling  NEUROLOGICAL: As per HPI          OBJECTIVE:    Vital Signs Last 24 Hrs  T(C): 36.7 (10-18-24 @ 12:49), Max: 36.7 (10-18-24 @ 12:39)  HR: 80 (10-18-24 @ 13:42) (74 - 82)  BP: 155/90 (10-18-24 @ 12:49) (122/84 - 156/94)  RR: 18 (10-18-24 @ 12:49) (16 - 18)  SpO2: 100% (10-18-24 @ 12:49) (97% - 100%)  Parameters below as of 21 Oct 2024 15:45  Patient On (Oxygen Delivery Method): room air      General Examination:  General: No acute distress  HEENT: Atraumatic, Normocephalic  Respiratory: CTA B/l.  No crackles, rhonchi, or wheezes.  Cardiovascular: RRR.  Normal S1 & S2.  Normal b/l radial and pedal pulses.    Neurological Examination:  General / Mental Status: AAO x 3.  No aphasia or dysarthria.  Naming and repetition intact.  Cranial Nerves: VFF x 4.  PERRL.  EOMI x 2, No nystagmus or diplopia.  B/l V1-V3 equal and intact to light touch and pinprick.  Symmetric facial movement and palate elevation.  B/l hearing equal to finger rub.  5/5 strength with b/l sternocleidomastoid and trapezius.  Midline tongue protrusion, with no atrophy or fasciculations.  Motor: Normal bulk & tone in all four extremities.  5/5 strength throughout all four extremities.  No downward drift, rigidity, spasticity, or tremors in any of the four extremities.  Sensory: Intact to light touch and pinprick in all four extremities.  Negative Romberg.  Reflex: 2+ and symmetric at b/l biceps, triceps, brachioradialis, patellae, and ankles.  Downgoing toes b/l.  Coordination: No dysmetria with b/l finger-to-nose and heel raise tests.  Symmetric rapid alternating movements b/l.  Gait: Normal, narrow-based gait.  No difficulty with tiptoe, heel, and tandem gaits.          LABORATORY VALUES:             General Chemistry:    17-Oct-2024 13:55, Basic Metabolic Panel  Sodium: 140, [135 - 145 mmol/L]  Potassium: 4.0, [3.5 - 5.3 mmol/L]  Chloride:   110, [96 - 108 mmol/L]  Carbon Dioxide: 24, [22 - 31 mmol/L]  Anion Gap: 6, [5 - 17 mmol/L]  Blood Urea Nitrogen:   28, [7 - 18 mg/dL]  Creatinine: 0.93, [0.50 - 1.30 mg/dL]  Glucose: 94, [70 - 99 mg/dL]  Calcium: 9.2, [8.4 - 10.5 mg/dL]  eGFR: 94, [>=60 mL/min/1.73m2], The estimated glomerular filtration rate (eGFR) calculation is based on  	the 2021 CKD-EPI creatinine equation, which is validated in male and  	female population 18 years of age and older (N Engl J Med 2021;  	385:3504-1278).    18-Oct-2024 12:50, Comprehensive Metabolic Panel  Sodium: 139, [135 - 145 mmol/L]  Potassium: 3.9, [3.5 - 5.3 mmol/L]  Chloride: 105, [96 - 108 mmol/L]  Carbon Dioxide: 24, [22 - 31 mmol/L]  Anion Gap: 10, [5 - 17 mmol/L]  Blood Urea Nitrogen:   22, [7 - 18 mg/dL]  Creatinine: 0.86, [0.50 - 1.30 mg/dL]  Glucose:   117, [70 - 99 mg/dL]  Calcium: 9.2, [8.4 - 10.5 mg/dL]  Protein Total: 7.9, [6.0 - 8.3 g/dL]  Albumin: 4.0, [3.5 - 5.0 g/dL]  Bilirubin Total: 0.3, [0.2 - 1.2 mg/dL]  Alkaline Phosphatase: 106, [40 - 120 U/L]  Aspartate Aminotransferase (AST/SGOT): 28, [10 - 40 U/L]  Alanine Aminotransferase (ALT/SGPT): 51, [10 - 60 U/L DA]  eGFR: 99, [>=60 mL/min/1.73m2], The estimated glomerular filtration rate (eGFR) calculation is based on  	the 2021 CKD-EPI creatinine equation, which is validated in male and  	female population 18 years of age and older (N Engl J Med 2021;  	385:9228-3934).    18-Oct-2024 12:50, Troponin I, High Sensitivity  Troponin I, High Sensitivity Result: 17.6, [ - <=78.5 ng/L], Serial measurements of high sensitivity troponin I (hs TnI) showing rapid  	upward or downward changes suggest acute myocardial injury.  Please note  	that a sustained elevation of hsTnI can be caused by renal disease,  	chronic heart failure, sepsis, pulmonary embolism and other clinical  	conditions.  Coagulation:    18-Oct-2024 12:50, Activated Partial Thromboplastin Time  Activated Partial Thromboplastin Time: 35.3, [24.5 - 35.6 sec], The recommended therapeutic heparin range (full dose) is 58-99 seconds.  	Argatroban range is 1.5 to 3.0 times of the baseline APTT value, not to  	exceed 100 seconds.  	Routine coagulation results should be interpreted with caution when  	taking Factor Xa inhibitors or direct thrombin inhibitors; blood sampling  	prior to drug intake is recommended.    18-Oct-2024 12:50, Prothrombin Time and INR, Plasma  Prothrombin Time, Plasma: 11.5, [9.9 - 13.4 sec], Effective September 24, 2024 the reference range has changed.  INR: 0.98, [0.85 - 1.16 ratio], Recommended targets/ranges for therapeutic INR:  	2.0-3.0 Deep vein thrombosis, pulmonary embolism, atrial fibrillation  	2.0-3.0 Mechanical aortic valve, antiphospholipid syndrome with previous  	arterial or venous thromboembolism  	2.5-3.5 Mechanical mitral valve, double mechanical valve (aortic and  	mitral positions, high risk valves)  	Note: Chest 2012 Feb;141(2 Suppl):7S-47S  	Routine coagulation results should be interpreted with caution when  	taking Factor Xa inhibitors or direct thrombin inhibitors; blood sampling  	prior to drug intake is recommended.  Hematology:    17-Oct-2024 13:55, Complete Blood Count  WBC Count: 6.23, [3.80 - 10.50 K/uL]  RBC Count: 4.97, [4.20 - 5.80 M/uL]  Hemoglobin: 16.1, [13.0 - 17.0 g/dL]  Hematocrit: 46.9, [39.0 - 50.0 %]  Mean Cell Volume: 94.4, [80.0 - 100.0 fl]  Mean Cell Hemoglobin: 32.4, [27.0 - 34.0 pg]  Mean Cell Hemoglobin Conc: 34.3, [32.0 - 36.0 gm/dL]  Red Cell Distrib Width: 12.4, [10.3 - 14.5 %]  Platelet Count - Automated: 281, [150 - 400 K/uL]  Nucleated RBC: 0, [0 - 0 /100 WBCs]    18-Oct-2024 12:50, Complete Blood Count + Automated Diff  WBC Count: 6.13, [3.80 - 10.50 K/uL]  RBC Count: 5.25, [4.20 - 5.80 M/uL]  Hemoglobin:   17.1, [13.0 - 17.0 g/dL]  Hematocrit: 49.8, [39.0 - 50.0 %]  Mean Cell Volume: 94.9, [80.0 - 100.0 fl]  Mean Cell Hemoglobin: 32.6, [27.0 - 34.0 pg]  Mean Cell Hemoglobin Conc: 34.3, [32.0 - 36.0 gm/dL]  Red Cell Distrib Width: 12.6, [10.3 - 14.5 %]  Platelet Count - Automated: 279, [150 - 400 K/uL]  Auto Neutrophil #: 3.51, [1.80 - 7.40 K/uL]  Auto Lymphocyte #: 1.99, [1.00 - 3.30 K/uL]  Auto Monocyte #: 0.46, [0.00 - 0.90 K/uL]  Auto Eosinophil #: 0.13, [0.00 - 0.50 K/uL]  Auto Basophil #: 0.02, [0.00 - 0.20 K/uL]  Auto Neutrophil %: 57.3, [43.0 - 77.0 %], Differential percentages must be correlated with absolute numbers for  	clinical significance.  Auto Lymphocyte %: 32.5, [13.0 - 44.0 %]  Auto Monocyte %: 7.5, [2.0 - 14.0 %]  Auto Eosinophil %: 2.1, [0.0 - 6.0 %]  Auto Basophil %: 0.3, [0.0 - 2.0 %]  Auto Immature Granulocyte %: 0.3, [0.0 - 0.9 %], (Includes meta, myelo and promyelocytes). Mild elevations in immature  	granulocytes may be seen with many inflammatory processes and pregnancy;  	clinical correlation suggested.  Nucleated RBC: 0, [0 - 0 /100 WBCs]          NEUROIMAGING:          Please contact the Neurology consult service with any neurological questions.    Zain Wetzel MD   of Neurology  [facility] [employer] [facility]

## 2024-10-18 NOTE — ED PROVIDER NOTE - PHYSICAL EXAMINATION
Gen: NAD; well appearing  Head: NCAT  Eyes: EOMI, PERRLA, no conjunctival pallor, no scleral icterus  ENT: mucous membranes moist, no discharge  Neck: neck supple  Resp: CTAB, no W/R/R  CV: RRR, +S1/S2, no M/R/G  GI: Abdomen soft non-distended, NTTP, no masses  MSK: No open wounds, no bruising, no lower extremity edema  Neuro: A&Ox4, decreased sensation RUE>LUE, RLE>LLE, R face>L face, +drift RUE, +drift LLE, limb ataxia heel to shin RLE and RUE finger to nose, follows commands  Ext: no edema, no deformity, warm and well-perfused  Skin: no rash or bruising

## 2024-10-18 NOTE — H&P ADULT - ATTENDING COMMENTS
Mr. Golden is a 59 y/o Ivorian speaking, male from home w/ PMH of asthma and GERD presents to the ER w/ c/o Rt sided weakness and numbness that started at 7am this morning. His weakness is persistent and has not improved. He reports that he had Rt arm cramp a week ago which resolved. He denies any prior hx of CVA, TIA. Pt denies chest pain, palpitations, sob, or other complaints.   Daughter at bedside informed that she noticed his Rt leg being colder than left. Pt denies any leg pain     In ED, VS were stable.    HE underwent CT head and CT Angio head and neck which was unremarkable.     PE as above   Neuro- Rt UE motor strength 3/5, Rt LE 2/5, left facial droop,  Rt LE cold to touch, DP, PT and popiteal pulse palpable.     A/P:  #CVA- Rt sided weakness   #Asthma  #GERD  #DVT ppx     Plan:  -patient p/w RT sided weakness, left facial droop. In ED, NIHSS was 6, no TPA given as patient out of window   -CT head, CT perfusion, CT Angio head and neck- unremarkable except mild to moderate intracranial stenosis   -Start asa load, Plavix, high intensity statin   -Follow ECHO, Telemetry, HbA1C and lipid profile  -Neuro consulted   -PT consult   -Rt LE appears cold, but no pain and pulses including DP,PT and popliteal palpable. Could be vasomotor changes related to stroke. Will get LE arterial duplex to evaluate further  -Resume inhalers   -Resume PPI  -Start Lovenox SC   -D/w daughter at bedside.

## 2024-10-18 NOTE — ED PROVIDER NOTE - CLINICAL SUMMARY MEDICAL DECISION MAKING FREE TEXT BOX
58-year-old male, past medical history significant for asthma, presenting with chief complaint of right-sided numbness 7AM (LKW 6:30 AM), 7 hrs ago and outside tpa window. Code stroke called given symptom onset <24 hrs. Neuro exam showing A&Ox4, decreased sensation RUE>LUE, RLE>LLE, R face>L face, +drift RUE, +drift LLE, limb ataxia heel to shin RLE and RUE finger to nose, follows commands. NIHSS 6. Stroke protocol

## 2024-10-18 NOTE — ED PROVIDER NOTE - CARE PLAN
SAMMI Bazan    I don't think that covers me for hippa there has to be a EDNA on file or we can get a fine     Routing to the provider to see if the provider wants to talk to the Brother    Principal Discharge DX:	Numbness   1

## 2024-10-18 NOTE — H&P ADULT - NSHPREVIEWOFSYSTEMS_GEN_ALL_CORE
REVIEW OF SYSTEMS:    CONSTITUTIONAL: No fever, chills, or weakness.   EYES/ENT: No visual changes;  No ear pain, runny nose, or sore throat.   NECK: No pain or stiffness.  RESPIRATORY: No cough, wheezing, hemoptysis; No shortness of breath.  CARDIOVASCULAR: No chest pain, dyspnea on exertion, or palpitations.  GASTROINTESTINAL: No abdominal or epigastric pain. No nausea, vomiting, or hematemesis; No diarrhea or constipation. No melena or hematochezia.  GENITOURINARY: No dysuria, frequency or hematuria.  NEUROLOGICAL: No numbness or weakness.  SKIN: No itching, rashes. REVIEW OF SYSTEMS:    CONSTITUTIONAL: No fever or chills. + generalized weakness. + headache   EYES/ENT: No visual changes;  No ear pain, runny nose, or sore throat.   NECK: No pain or stiffness.  RESPIRATORY: No cough, wheezing, hemoptysis; No shortness of breath.  CARDIOVASCULAR: No chest pain, dyspnea on exertion, or palpitations.  GASTROINTESTINAL: No abdominal or epigastric pain. + nausea.  NO vomiting, or hematemesis; No diarrhea or constipation. No melena or hematochezia.  GENITOURINARY: No dysuria, frequency or hematuria.  NEUROLOGICAL: + left sided facial weakness, and right sided upper extremity and lower extremity weakness.  SKIN: No itching, rashes.

## 2024-10-18 NOTE — H&P ADULT - NSHPPHYSICALEXAM_GEN_ALL_CORE
T(C): 36.7 (10-18-24 @ 12:49), Max: 36.7 (10-18-24 @ 12:39)  HR: 80 (10-18-24 @ 13:42) (74 - 82)  BP: 155/90 (10-18-24 @ 12:49) (122/84 - 156/94)  RR: 18 (10-18-24 @ 12:49) (16 - 18)  SpO2: 100% (10-18-24 @ 12:49) (97% - 100%)    CONSTITUTIONAL: Well groomed, no apparent distress  EYES: PERRLA and symmetric, EOMI, No conjunctival or scleral injection, non-icteric  ENMT: Oral mucosa with moist membranes. Normal dentition; no pharyngeal injection or exudates             NECK: Supple, symmetric and without tracheal deviation   RESP: No respiratory distress, no use of accessory muscles; CTA b/l, no WRR  CV: RRR, +S1S2, no MRG; no JVD; no peripheral edema  GI: Soft, NT, ND, no rebound, no guarding; no palpable masses; no hepatosplenomegaly; no hernia palpated  LYMPH: No cervical LAD or tenderness; no axillary LAD or tenderness; no inguinal LAD or tenderness  MSK: Normal gait; No digital clubbing or cyanosis; examination of the (head/neck/spine/ribs/pelvis, RUE, LUE, RLE, LLE) without misalignment,            Normal ROM without pain, no spinal tenderness, normal muscle strength/tone  SKIN: No rashes or ulcers noted; no subcutaneous nodules or induration palpable  NEURO: CN II-XII intact; normal reflexes in upper and lower extremities, sensation intact in upper and lower extremities b/l to light touch   PSYCH: Appropriate insight/judgment; A+O x 3, mood and affect appropriate, recent/remote memory intact T(C): 36.7 (10-18-24 @ 12:49), Max: 36.7 (10-18-24 @ 12:39)  HR: 80 (10-18-24 @ 13:42) (74 - 82)  BP: 155/90 (10-18-24 @ 12:49) (122/84 - 156/94)  RR: 18 (10-18-24 @ 12:49) (16 - 18)  SpO2: 100% (10-18-24 @ 12:49) (97% - 100%)    CONSTITUTIONAL: Well groomed, no apparent distress  EYES: PERRLA and symmetric, EOMI, No conjunctival or scleral injection, non-icteric  ENMT: Oral mucosa with moist membranes. Normal dentition; no pharyngeal injection or exudates             NECK: Supple, symmetric and without tracheal deviation   RESP: No respiratory distress, no use of accessory muscles; CTA b/l, no WRR  CV: RRR, +S1S2, no MRG; no JVD; no peripheral edema  GI: Soft, NT, ND, no rebound, no guarding; no palpable masses; no hepatosplenomegaly; no hernia palpated  LYMPH: No cervical LAD or tenderness; no axillary LAD or tenderness; no inguinal LAD or tenderness  MSK: Normal gait; No digital clubbing or cyanosis; examination of the (head/neck/spine/ribs/pelvis, RUE, LUE, RLE, LLE) without misalignment,            Normal ROM without pain, no spinal tenderness, normal muscle strength/tone  SKIN: No rashes or ulcers noted; no subcutaneous nodules or induration palpable  NEURO: left sided facial droop. Right sided weakness in upper and lower extremity.   PSYCH: Appropriate insight/judgment; A+O x 3, mood and affect appropriate, recent/remote memory intact T(C): 36.7 (10-18-24 @ 12:49), Max: 36.7 (10-18-24 @ 12:39)  HR: 80 (10-18-24 @ 13:42) (74 - 82)  BP: 155/90 (10-18-24 @ 12:49) (122/84 - 156/94)  RR: 18 (10-18-24 @ 12:49) (16 - 18)  SpO2: 100% (10-18-24 @ 12:49) (97% - 100%)    CONSTITUTIONAL: Well groomed, no apparent distress  EYES: PERRLA and symmetric, EOMI, No conjunctival or scleral injection, non-icteric  ENMT: Oral mucosa with moist membranes. Normal dentition; no pharyngeal injection or exudates             NECK: Supple, symmetric and without tracheal deviation   RESP: No respiratory distress, no use of accessory muscles; CTA b/l, no WRR  CV: RRR, +S1S2, no MRG; no JVD; no peripheral edema  GI: Soft, NT, ND, no rebound, no guarding; no palpable masses; no hepatosplenomegaly; no hernia palpated  LYMPH: No cervical LAD or tenderness; no axillary LAD or tenderness; no inguinal LAD or tenderness  MSK: Normal gait; No digital clubbing or cyanosis; examination of the (head/neck/spine/ribs/pelvis, RUE, LUE, RLE, LLE) without misalignment,            Normal ROM without pain, no spinal tenderness, normal muscle strength/tone  SKIN: Right leg is more cold the left.   NEURO: left sided facial droop. Right sided weakness in upper and lower extremity.  weak pronator drift on the right.  PSYCH: Appropriate insight/judgment; A+O x 3, mood and affect appropriate, recent/remote memory intact

## 2024-10-18 NOTE — ED ADULT NURSE NOTE - OBJECTIVE STATEMENT
Covering RN VPS: Pt presents to the ED accompanied by daughter c/o right sided facial and body numbness that started around 0700 this morning. Pt denies any N/V, chest pain, SOB, and fevers, Code stroke activated upon ED arrival.  LKW 0630 as per Pt's daughter. Covering RN VPS: Pt presents to the ED accompanied by daughter c/o right sided facial and body numbness that started around 0700 this morning. Pt denies any N/V, chest pain, SOB, and fevers, Code stroke activated upon ED arrival.  LKW 0630 as per Pt's daughter. MD Mclean aware.

## 2024-10-18 NOTE — H&P ADULT - HISTORY OF PRESENT ILLNESS
ED course:  Vitals: Temp 98.1, HR 82, /94  CT brain perfusion neg  CTH neg   s/p reglan      58-year-old male, AAOx3, Walks independently with past medical history significant for asthma and GERD  presenting with ight-sided numbness.  Patient states that he noticed this morning at 7 AM, he had numbness from the right elbow to his right hand, as well as  to his right lower extremity, from the right knee to the right foot.  Patient states that now, numbness has progressed to involve his entire right side of his body. Patient also reports he has Left sided facial droop and swelling of his left eye.  He does endorse mild headache as well as nausea. But Denies any dizziness, CP, SOB, abdominal pain, vomiting diarrhea, or dysuria . No recent travel or sick contacts.       ED course:  Vitals: Temp 98.1, HR 82, /94  CT brain perfusion neg  CTH neg   s/p reglan      58-year-old male, AAOx3, Walks independently with past medical history significant for asthma and GERD  presenting with Right-sided numbness.  Patient states that he noticed this morning at 7 AM, he had numbness from the right elbow to his right hand, as well as  to his right lower extremity, from the right knee to the right foot.  Patient states that now, numbness has progressed to involve his entire right side of his body. Patient also reports he has Left sided facial droop and swelling of his left eye.  He does endorse mild headache as well as nausea. But Denies any dizziness, CP, SOB, abdominal pain, vomiting diarrhea, or dysuria . No recent travel or sick contacts.       TO note: Patient was here yesterday for Left sided eye swelling but was sent home.     ED course:  Vitals: Temp 98.1, HR 82, /94  CT brain perfusion neg  CTH neg   s/p reglan

## 2024-10-18 NOTE — PATIENT PROFILE ADULT - NSFALLSECTIONLABEL_GEN_A_CORE
Julio Conde)  Urology  [address] [place] [address]  Phone: [phone]  Fax: [phone]  Follow Up Time:     Stefano Griffith)  Urology  [address] [place] [address], [place] [address]  Phone: [phone]  Fax: [phone]  Follow Up Time:
.

## 2024-10-18 NOTE — H&P ADULT - ASSESSMENT
58-year-old male, AAOx3, Walks independently with past medical history significant for asthma and GERD  presenting with ight-sided numbness.  Patient states that he noticed this morning at 7 AM, he had numbness from the right elbow to his right hand, as well as  to his right lower extremity, from the right knee to the right foot. CT brain perfusion neg. CTH neg. Vitals wnl and labs wnl. Patient is being admitted for stroke work up.  58-year-old male, AAOx3, Walks independently with past medical history significant for asthma and GERD  presenting with ight-sided numbness.  Patient states that he noticed this morning at 7 AM, he had numbness from the right elbow to his right hand, as well as  to his right lower extremity, from the right knee to the right foot. CT brain perfusion neg. CTH neg. Vitals wnl and labs wnl. Patient is being admitted for CVA work up.

## 2024-10-19 LAB
A1C WITH ESTIMATED AVERAGE GLUCOSE RESULT: 6.4 % — HIGH (ref 4–5.6)
ALBUMIN SERPL ELPH-MCNC: 3.8 G/DL — SIGNIFICANT CHANGE UP (ref 3.5–5)
ALP SERPL-CCNC: 69 U/L — SIGNIFICANT CHANGE UP (ref 40–120)
ALT FLD-CCNC: 44 U/L DA — SIGNIFICANT CHANGE UP (ref 10–60)
ANION GAP SERPL CALC-SCNC: 8 MMOL/L — SIGNIFICANT CHANGE UP (ref 5–17)
AST SERPL-CCNC: 22 U/L — SIGNIFICANT CHANGE UP (ref 10–40)
BILIRUB SERPL-MCNC: 0.5 MG/DL — SIGNIFICANT CHANGE UP (ref 0.2–1.2)
BUN SERPL-MCNC: 17 MG/DL — SIGNIFICANT CHANGE UP (ref 7–18)
CALCIUM SERPL-MCNC: 9.2 MG/DL — SIGNIFICANT CHANGE UP (ref 8.4–10.5)
CHLORIDE SERPL-SCNC: 107 MMOL/L — SIGNIFICANT CHANGE UP (ref 96–108)
CHOLEST SERPL-MCNC: 219 MG/DL — HIGH
CO2 SERPL-SCNC: 24 MMOL/L — SIGNIFICANT CHANGE UP (ref 22–31)
CREAT SERPL-MCNC: 0.93 MG/DL — SIGNIFICANT CHANGE UP (ref 0.5–1.3)
EGFR: 94 ML/MIN/1.73M2 — SIGNIFICANT CHANGE UP
ESTIMATED AVERAGE GLUCOSE: 137 MG/DL — HIGH (ref 68–114)
GLUCOSE SERPL-MCNC: 124 MG/DL — HIGH (ref 70–99)
HCT VFR BLD CALC: 48.9 % — SIGNIFICANT CHANGE UP (ref 39–50)
HDLC SERPL-MCNC: 44 MG/DL — SIGNIFICANT CHANGE UP
HGB BLD-MCNC: 16.8 G/DL — SIGNIFICANT CHANGE UP (ref 13–17)
LIPID PNL WITH DIRECT LDL SERPL: 101 MG/DL — HIGH
MAGNESIUM SERPL-MCNC: 2.4 MG/DL — SIGNIFICANT CHANGE UP (ref 1.6–2.6)
MCHC RBC-ENTMCNC: 32.4 PG — SIGNIFICANT CHANGE UP (ref 27–34)
MCHC RBC-ENTMCNC: 34.4 GM/DL — SIGNIFICANT CHANGE UP (ref 32–36)
MCV RBC AUTO: 94.2 FL — SIGNIFICANT CHANGE UP (ref 80–100)
NON HDL CHOLESTEROL: 175 MG/DL — HIGH
NRBC # BLD: 0 /100 WBCS — SIGNIFICANT CHANGE UP (ref 0–0)
PHOSPHATE SERPL-MCNC: 3.9 MG/DL — SIGNIFICANT CHANGE UP (ref 2.5–4.5)
PLATELET # BLD AUTO: 286 K/UL — SIGNIFICANT CHANGE UP (ref 150–400)
POTASSIUM SERPL-MCNC: 4 MMOL/L — SIGNIFICANT CHANGE UP (ref 3.5–5.3)
POTASSIUM SERPL-SCNC: 4 MMOL/L — SIGNIFICANT CHANGE UP (ref 3.5–5.3)
PROT SERPL-MCNC: 7.3 G/DL — SIGNIFICANT CHANGE UP (ref 6–8.3)
RBC # BLD: 5.19 M/UL — SIGNIFICANT CHANGE UP (ref 4.2–5.8)
RBC # FLD: 12.6 % — SIGNIFICANT CHANGE UP (ref 10.3–14.5)
SODIUM SERPL-SCNC: 139 MMOL/L — SIGNIFICANT CHANGE UP (ref 135–145)
TRIGL SERPL-MCNC: 369 MG/DL — HIGH
WBC # BLD: 4.98 K/UL — SIGNIFICANT CHANGE UP (ref 3.8–10.5)
WBC # FLD AUTO: 4.98 K/UL — SIGNIFICANT CHANGE UP (ref 3.8–10.5)

## 2024-10-19 PROCEDURE — 99233 SBSQ HOSP IP/OBS HIGH 50: CPT | Mod: GC

## 2024-10-19 RX ORDER — ACETAMINOPHEN 500 MG
650 TABLET ORAL ONCE
Refills: 0 | Status: COMPLETED | OUTPATIENT
Start: 2024-10-19 | End: 2024-10-19

## 2024-10-19 RX ADMIN — Medication 650 MILLIGRAM(S): at 11:36

## 2024-10-19 RX ADMIN — Medication 40 MILLIGRAM(S): at 21:27

## 2024-10-19 RX ADMIN — Medication 80 MILLIGRAM(S): at 21:18

## 2024-10-19 RX ADMIN — Medication 81 MILLIGRAM(S): at 12:33

## 2024-10-19 RX ADMIN — Medication 650 MILLIGRAM(S): at 21:18

## 2024-10-19 RX ADMIN — Medication 650 MILLIGRAM(S): at 22:18

## 2024-10-19 RX ADMIN — Medication 650 MILLIGRAM(S): at 10:36

## 2024-10-19 RX ADMIN — PANTOPRAZOLE SODIUM 40 MILLIGRAM(S): 40 TABLET, DELAYED RELEASE ORAL at 06:07

## 2024-10-19 RX ADMIN — CLOPIDOGREL 75 MILLIGRAM(S): 75 TABLET ORAL at 12:33

## 2024-10-19 NOTE — PHYSICAL THERAPY INITIAL EVALUATION ADULT - REFERRAL TO ANOTHER SERVICE NEEDED, PT EVAL
Pt states he is having difficulty with drinking water and swallowing solid foods. DO aware/speech language pathology

## 2024-10-19 NOTE — PHYSICAL THERAPY INITIAL EVALUATION ADULT - ADDITIONAL COMMENTS
Pt lives in a private basement inside of a house. Pt has 10 lilli basement with rails on both sides. Pt was fully independent with ambulation and ADLs prior to admission

## 2024-10-19 NOTE — PROGRESS NOTE ADULT - ASSESSMENT
58-year-old male, AAOx3, Walks independently with past medical history significant for asthma and GERD  presenting with right-sided numbness.  Patient states that he noticed this morning at 7 AM, he had numbness from the right elbow to his right hand, as well as  to his right lower extremity, from the right knee to the right foot. CT brain perfusion neg. CTH neg. Vitals wnl and labs wnl. Patient is being admitted for CVA work up.

## 2024-10-19 NOTE — PHYSICAL THERAPY INITIAL EVALUATION ADULT - BED MOBILITY LIMITATIONS, REHAB EVAL
R UE + LE/decreased ability to use arms for pushing/pulling/decreased ability to use legs for bridging/pushing

## 2024-10-19 NOTE — PHYSICAL THERAPY INITIAL EVALUATION ADULT - LIVES WITH, PROFILE
of one  year, it has not intensified in nature and has not needed more and  more treatment, so it is a stable angina. He is not short of breath. There is no cough or sputum. Couple of weeks ago, he was lifting a  heavy basket and he developed a new hernia in his left groin. It is  painful from time to time, but it reduces, comes on when he coughs. He had an old hernia on the right side, which was repaired with a  mesh. When the patient arrived here, blood pressure 202/93, heart  rate 78 per minute, respirations 14, temperature 97.8. Pressure came  down to 180, 165, 157, 66. It is now 148/70. Continues to have pain. PAST MEDICAL HISTORY:  Hypertension, hyperlipidemia, and coronary  artery disease. PAST SURGICAL HISTORY:  Hernia surgery on the right, cardiac surgery,  and bypass surgery, four vessels on the right in 1997 and three in  2014. FAMILY HISTORY:  The patient has one brother and one sister. Has high  blood pressure in the family. No diabetes. SOCIAL HISTORY:  He does not smoke or drink. Children, the patient  has four. [employer] attendance, yes, [employer] in [address] [place] [address]. HOME MEDICATIONS:  He is on metoprolol, he truly takes 25 mg in the  morning and 12.5 mg at night, Mevacor 40 mg at night. REVIEW OF SYSTEMS:  CNS:  No headaches, no dizziness, no lightheadedness. NOSE AND THROAT:  No runny nose or postnasal drip. No epistaxis. RESPIRATORY SYSTEM:  No cough or sputum. No shortness of breath. CARDIOVASCULAR:  Chest pain as described. For the last one year, it  has been stable. URINARY SYSTEM:  He has a bit of prostatism. No blood in the urine. CONSTITUTIONAL:  He bleeds very easily when he is given anticoagulants  and even on the aspirin, he gets nosebleeds. I think there was a GI  bleed once. EARS:  No hearing loss, tinnitus, or ringing. EYES:  No double vision or blurred vision. LYMPHATIC SYSTEM:  No peripheral lymphadenopathy.   MUSCULOSKELETAL SYSTEM:  No Wife + Daughter/children/spouse aches, pain, swelling, or redness in any  of the joints except the left proximal metacarpophalangeal joint,  which was from osteoarthritis. ENDOCRINE:  No diabetes. No thyroid disease. EYES:  No double vision or blurred vision. PSYCHIATRIC:  No depression. No disorientation to time, place, and  person. VASCULAR SYSTEM:  His feet are cold. _____ when he takes high doses  of metoprolol and when he is bradycardic. PHYSICAL EXAMINATION:  GENERAL:  He is conscious, alert, and oriented in time, place, and  person. VITAL SIGNS:  Blood pressure 157/66; heart rate 52 per minute, it  dropped down into the 40s; respirations 16, temperature 97.8, pulse  oximetry 100%, weight 155 pounds, body mass index 21.7. HEAD AND SCALP. Atraumatic. OROPHARYNX:  Soft and hard palate and dentition is normal.  CHEST:  Chest wall palpation, percussion, and auscultation is normal.   Vesicular breath sounds only heard. HEART:  Palpation, percussion, auscultation is normal.  First and  second heart sounds only heard. ABDOMEN:  Full. Soft. Nontender. Bowel sounds normal.  Spleen,  liver, and kidney not enlarged. PERIPHERAL VASCULAR SYSTEM:  Radial pulses on the right is strong,  radial pulse on the left is weak, bilateral femoral pulses are strong,  dorsalis pedis pulses are weak on both sides, posterior tibial pulses  are better on both sides. Capillary refill is slightly less in both  lower extremity and toes. Both feet are little colder. NEUROLOGICAL SYSTEM:  Normal muscle power, sensation, and coordination  in all four extremities. EYES:  No double vision or blurred vision. Pupils are round and  reactive to light directly and consensually. Extraocular eye  movements are normal.  EARS:  External auditory canal, eardrums normal.  No perforation. NOSE:  Nasal cavity turbinates, maxillary, frontal, ethmoidal sinuses  normal.  CENTRAL NERVOUS SYSTEM:  Cranial nerves II through XII intact.   MUSCULOSKELETAL SYSTEM: not have here, but the rhythm  strip did show normal sinus rhythm. Ordering another EKG stat. The  patient has a hernia. It is a little painful. It should not be, but  it is reducible. We need General Surgery consult. Troponin q.6 hours  x3. Ultrasound of the gallbladder, lipase, and amylase daily. May  need MRCP. For the lung nodule, consult Pulmonology. We will need a  PET scan. The nodule does not look benign clinically. Still needs a  followup. Plan of care explained to the patient. He should be  evaluated for his heart. Hospitalist Service will follow. Diet is  cardiac diet. Activity, bed rest.  We will watch his heart rate, he  may need a loop recorder for tachy-tamia syndrome and may need a  pacemaker.         Adamaris Cunningham MD, [facility]    D: 10/17/2017 5:26:18       T: 10/17/2017 8:16:15     [email or web address]  Job#: [id number]     Doc#: [id number]

## 2024-10-19 NOTE — PROGRESS NOTE ADULT - SUBJECTIVE AND OBJECTIVE BOX
PGY-1 Progress Note discussed with attending      PLEASE CONTACT ON CALL TEAM:  - On Call Team (Please refer to Christiano) FROM 5:00 PM - 8:30PM  - Nightfloat Team FROM 8:30 -7:30 AM    INTERVAL HPI/OVERNIGHT EVENTS: No acute events overnight.  Patient examined at bedside this AM. Reports that he has almost complete loss of sensation on right side of body. Also feels like bottom of feet on right side hot, and left side cold. Reports that he cannot feel temperature well.       REVIEW OF SYSTEMS:  CONSTITUTIONAL: No fever, weight loss, or fatigue  RESPIRATORY: No cough, wheezing, chills or hemoptysis; No shortness of breath  CARDIOVASCULAR: No chest pain, palpitations, dizziness, or leg swelling  GASTROINTESTINAL: No abdominal pain. No nausea, vomiting, or hematemesis; No diarrhea or constipation. No melena or hematochezia.  GENITOURINARY: No dysuria or hematuria, urinary frequency  NEUROLOGICAL: No headaches, memory loss, +loss of strength, +numbness, no tremors  SKIN: No itching, burning, rashes, or lesions     MEDICATIONS  (STANDING):  aspirin  chewable 81 milliGRAM(s) Oral daily  atorvastatin 80 milliGRAM(s) Oral at bedtime  clopidogrel Tablet 75 milliGRAM(s) Oral daily  enoxaparin Injectable 40 milliGRAM(s) SubCutaneous every 24 hours  pantoprazole    Tablet 40 milliGRAM(s) Oral before breakfast    MEDICATIONS  (PRN):      Vital Signs Last 24 Hrs  T(C): 36.8 (19 Oct 2024 11:13), Max: 36.9 (18 Oct 2024 14:49)  T(F): 98.2 (19 Oct 2024 11:13), Max: 98.4 (18 Oct 2024 14:49)  HR: 98 (19 Oct 2024 11:13) (67 - 106)  BP: 106/89 (19 Oct 2024 11:13) (106/89 - 150/87)  BP(mean): --  RR: 18 (19 Oct 2024 11:13) (16 - 19)  SpO2: 97% (19 Oct 2024 11:13) (95% - 100%)    Parameters below as of 19 Oct 2024 11:13  Patient On (Oxygen Delivery Method): room air        PHYSICAL EXAMINATION:  GENERAL: NAD  HEAD:  Atraumatic, Normocephalic  EYES:  conjunctiva and sclera clear  NECK: Supple, No JVD, Normal thyroid  CHEST/LUNG: Clear to auscultation. Clear to percussion bilaterally; No rales, rhonchi, wheezing, or rubs  HEART: Regular rate and rhythm; No murmurs, rubs, or gallops  ABDOMEN: Soft, Nontender, Nondistended; Bowel sounds present, no pain or masses on palpation  NERVOUS SYSTEM:  Alert & Oriented X3. 3/5 strength entire right side. Ambulation difficulty. Left facial droop mildly present.   EXTREMITIES:  2+ Peripheral Pulses, No clubbing, cyanosis, or edema  SKIN: warm dry                          16.8   4.98  )-----------( 286      ( 19 Oct 2024 05:30 )             48.9     10-19    139  |  107  |  17  ----------------------------<  124[H]  4.0   |  24  |  0.93    Ca    9.2      19 Oct 2024 05:30  Phos  3.9     10-19  Mg     2.4     10-19    TPro  7.3  /  Alb  3.8  /  TBili  0.5  /  DBili  x   /  AST  22  /  ALT  44  /  AlkPhos  69  10-19    LIVER FUNCTIONS - ( 19 Oct 2024 05:30 )  Alb: 3.8 g/dL / Pro: 7.3 g/dL / ALK PHOS: 69 U/L / ALT: 44 U/L DA / AST: 22 U/L / GGT: x               PT/INR - ( 18 Oct 2024 12:50 )   PT: 11.5 sec;   INR: 0.98 ratio         PTT - ( 18 Oct 2024 12:50 )  PTT:35.3 sec    I&O's Summary          CAPILLARY BLOOD GLUCOSE      RADIOLOGY & ADDITIONAL TESTS:

## 2024-10-19 NOTE — PROGRESS NOTE ADULT - PROBLEM SELECTOR PLAN 1
P/w Left  sided facial droop and rigth sided weakness   LKW 7 am   Vitals: Temp 98.1, HR 82, /94  CT brain perfusion neg  CTH neg   s/p reglan   s/p aspirin 325   Code stroke NIHSS   6  called in ED  EKG showing NSR  Lipids elevated  A1c 6.4  SHWETA normal    passed dysphagia screen - however pt endorsing swallowing difficulty - f/u speech and swallow. puree diet + thick liquids for now  -ASA, plavix 75 mg for 3 weeks, atorvastatin 80 mg   -admit to telemetry  -f/u TTE  -f/u MRI Head  Neurology consulted: Dr. Wetzel

## 2024-10-19 NOTE — PHYSICAL THERAPY INITIAL EVALUATION ADULT - DIAGNOSIS, PT EVAL
(ICF Model) Pt. present w/deficits in Body Structures/Function (Impairments), incl: Strength, Balance, sensation, pain leading to deficits in performing the below noted Activities (Limitations).

## 2024-10-19 NOTE — PROGRESS NOTE ADULT - ATTENDING COMMENTS
This is a late entry, patient was evaluated on 10/19    Mr. Golden is a 57 y/o Macedonian speaking, male from home w/ PMH of asthma and GERD presents to the ER w/ c/o Rt sided weakness and numbness that started at 7am this morning. His weakness is persistent and has not improved. He reports that he had Rt arm cramp a week ago which resolved. He denies any prior hx of CVA, TIA. Pt denies chest pain, palpitations, sob, or other complaints. Admitted for CVA w/u    Pt was evaluated, c/o feeling Rt sided weakness- now w/ numbness and sensory impairment.  ID Bernadette [id number]     PE as above     Labs reviewed-cbc, bmp, lipid profile     A/P:  #CVA- Rt sided weakness   #Hyperlipidemia  #Hypertriglyceridemia   #Asthma  #GERD  #DVT ppx     Plan:  -patient p/w RT sided weakness, left facial droop. In ED, NIHSS was 6, no TPA given as patient out of window   -CT head, CT perfusion, CT Angio head and neck- unremarkable except mild to moderate intracranial stenosis   -C/w asa, Plavix, statin   -Follow MR head, ECHO, Telemetry,   -Hba1C 6.4%, Lipid profile noted   -Neuro consulted   -PT consult   -LE not cold today, SHWETA reviewed- wnl.   -c/w  inhalers   -c/w  PPI  -C/w  Lovenox SC   -D/w daughter at bedside.

## 2024-10-19 NOTE — PHYSICAL THERAPY INITIAL EVALUATION ADULT - PERTINENT HX OF CURRENT PROBLEM, REHAB EVAL
pt admitted on 10/18/24 for R sided numbness/weakness and CVA workup. Pt presented to ED for L eye swelling on 10/17 but was sent home after treatment. At home pt began to feel R sided numbness and a headache on the left side of his head. Pt returned to ED on 10/18 for stroke like symptoms. CT head shows no acute infarct or hemorrhage. MRI pending at this time.

## 2024-10-19 NOTE — PHYSICAL THERAPY INITIAL EVALUATION ADULT - GENERAL OBSERVATIONS, REHAB EVAL
Consult received, chart reviewed. Patient received supine in bed, NAD, + tele. Patient agreed to EVALUATION from Physical Therapist.

## 2024-10-20 LAB
ALBUMIN SERPL ELPH-MCNC: 3.7 G/DL — SIGNIFICANT CHANGE UP (ref 3.5–5)
ALP SERPL-CCNC: 69 U/L — SIGNIFICANT CHANGE UP (ref 40–120)
ALT FLD-CCNC: 42 U/L DA — SIGNIFICANT CHANGE UP (ref 10–60)
ANION GAP SERPL CALC-SCNC: 6 MMOL/L — SIGNIFICANT CHANGE UP (ref 5–17)
AST SERPL-CCNC: 22 U/L — SIGNIFICANT CHANGE UP (ref 10–40)
BILIRUB SERPL-MCNC: 0.5 MG/DL — SIGNIFICANT CHANGE UP (ref 0.2–1.2)
BUN SERPL-MCNC: 21 MG/DL — HIGH (ref 7–18)
CALCIUM SERPL-MCNC: 9.1 MG/DL — SIGNIFICANT CHANGE UP (ref 8.4–10.5)
CHLORIDE SERPL-SCNC: 109 MMOL/L — HIGH (ref 96–108)
CO2 SERPL-SCNC: 26 MMOL/L — SIGNIFICANT CHANGE UP (ref 22–31)
CREAT SERPL-MCNC: 1 MG/DL — SIGNIFICANT CHANGE UP (ref 0.5–1.3)
EGFR: 86 ML/MIN/1.73M2 — SIGNIFICANT CHANGE UP
GLUCOSE SERPL-MCNC: 122 MG/DL — HIGH (ref 70–99)
HCT VFR BLD CALC: 49.3 % — SIGNIFICANT CHANGE UP (ref 39–50)
HGB BLD-MCNC: 16.9 G/DL — SIGNIFICANT CHANGE UP (ref 13–17)
MAGNESIUM SERPL-MCNC: 2.3 MG/DL — SIGNIFICANT CHANGE UP (ref 1.6–2.6)
MCHC RBC-ENTMCNC: 32.8 PG — SIGNIFICANT CHANGE UP (ref 27–34)
MCHC RBC-ENTMCNC: 34.3 GM/DL — SIGNIFICANT CHANGE UP (ref 32–36)
MCV RBC AUTO: 95.7 FL — SIGNIFICANT CHANGE UP (ref 80–100)
NRBC # BLD: 0 /100 WBCS — SIGNIFICANT CHANGE UP (ref 0–0)
PHOSPHATE SERPL-MCNC: 3.8 MG/DL — SIGNIFICANT CHANGE UP (ref 2.5–4.5)
PLATELET # BLD AUTO: 288 K/UL — SIGNIFICANT CHANGE UP (ref 150–400)
POTASSIUM SERPL-MCNC: 3.9 MMOL/L — SIGNIFICANT CHANGE UP (ref 3.5–5.3)
POTASSIUM SERPL-SCNC: 3.9 MMOL/L — SIGNIFICANT CHANGE UP (ref 3.5–5.3)
PROT SERPL-MCNC: 7.3 G/DL — SIGNIFICANT CHANGE UP (ref 6–8.3)
RBC # BLD: 5.15 M/UL — SIGNIFICANT CHANGE UP (ref 4.2–5.8)
RBC # FLD: 12.4 % — SIGNIFICANT CHANGE UP (ref 10.3–14.5)
SODIUM SERPL-SCNC: 141 MMOL/L — SIGNIFICANT CHANGE UP (ref 135–145)
WBC # BLD: 5.59 K/UL — SIGNIFICANT CHANGE UP (ref 3.8–10.5)
WBC # FLD AUTO: 5.59 K/UL — SIGNIFICANT CHANGE UP (ref 3.8–10.5)

## 2024-10-20 PROCEDURE — 70552 MRI BRAIN STEM W/DYE: CPT | Mod: 26

## 2024-10-20 PROCEDURE — 99233 SBSQ HOSP IP/OBS HIGH 50: CPT | Mod: GC

## 2024-10-20 RX ORDER — ACETAMINOPHEN 500 MG
650 TABLET ORAL EVERY 6 HOURS
Refills: 0 | Status: DISCONTINUED | OUTPATIENT
Start: 2024-10-20 | End: 2024-10-24

## 2024-10-20 RX ADMIN — Medication 80 MILLIGRAM(S): at 21:51

## 2024-10-20 RX ADMIN — PANTOPRAZOLE SODIUM 40 MILLIGRAM(S): 40 TABLET, DELAYED RELEASE ORAL at 05:37

## 2024-10-20 RX ADMIN — CLOPIDOGREL 75 MILLIGRAM(S): 75 TABLET ORAL at 12:27

## 2024-10-20 RX ADMIN — Medication 650 MILLIGRAM(S): at 10:00

## 2024-10-20 RX ADMIN — Medication 81 MILLIGRAM(S): at 12:28

## 2024-10-20 RX ADMIN — Medication 650 MILLIGRAM(S): at 09:08

## 2024-10-20 RX ADMIN — Medication 40 MILLIGRAM(S): at 21:51

## 2024-10-20 NOTE — PROGRESS NOTE ADULT - SUBJECTIVE AND OBJECTIVE BOX
PGY-1 Progress Note discussed with attending    PLEASE MS TEAMS AUTHOR TILL 5:00 PM    PLEASE CONTACT ON CALL TEAM:  - On Call Team (Please refer to Christiano) FROM 5:00 PM - 8:30PM  - Nightfloat Team FROM 8:30 -7:30 AM      INTERVAL HPI/OVERNIGHT EVENTS: No acute events overnight.    SUBJECTIVE:  [id number]. Patient seen and examined at bedside this morning. Still feels R-sided decrease in sensation and temperature sensation and weakness. Also c/o intermittent L eye pain radiating to the back of the head and LUE pain.     ---------------------------    REVIEW OF SYSTEMS:  CONSTITUTIONAL: No fever, weight loss, or fatigue  RESPIRATORY: No cough, wheezing, chills or hemoptysis; No shortness of breath  CARDIOVASCULAR: No chest pain, palpitations, dizziness, or leg swelling  GASTROINTESTINAL: No abdominal pain. No nausea, vomiting, or hematemesis; No diarrhea or constipation. No melena or hematochezia.  GENITOURINARY: No dysuria or hematuria, urinary frequency  NEUROLOGICAL: No headaches, memory loss, or tremors. +R-sided loss of sensation, temperature weakness  SKIN: No itching, burning, rashes, or lesions     MEDICATIONS  (STANDING):  aspirin  chewable 81 milliGRAM(s) Oral daily  atorvastatin 80 milliGRAM(s) Oral at bedtime  clopidogrel Tablet 75 milliGRAM(s) Oral daily  enoxaparin Injectable 40 milliGRAM(s) SubCutaneous every 24 hours  pantoprazole    Tablet 40 milliGRAM(s) Oral before breakfast    MEDICATIONS  (PRN):  acetaminophen     Tablet .. 650 milliGRAM(s) Oral every 6 hours PRN Temp greater or equal to 38C (100.4F), Mild Pain (1 - 3), Moderate Pain (4 - 6)      Vital Signs Last 24 Hrs  T(C): 36.7 (20 Oct 2024 07:30), Max: 37 (19 Oct 2024 15:36)  T(F): 98.1 (20 Oct 2024 07:30), Max: 98.6 (19 Oct 2024 15:36)  HR: 77 (20 Oct 2024 07:30) (77 - 100)  BP: 124/86 (20 Oct 2024 07:30) (111/80 - 132/93)  BP(mean): --  RR: 18 (20 Oct 2024 07:30) (18 - 18)  SpO2: 98% (20 Oct 2024 07:30) (95% - 98%)    Parameters below as of 20 Oct 2024 07:30  Patient On (Oxygen Delivery Method): room air        -----------------------------    PHYSICAL EXAMINATION:  GENERAL: NAD, lying in bed  HEAD:  Atraumatic, Normocephalic  EYES:  conjunctiva and sclera clear  NECK: Supple, No JVD  CHEST/LUNG: Clear to auscultation bilaterally; No rales, rhonchi, wheezing, or rubs  HEART: Regular rate and rhythm; No murmurs, rubs, or gallops  ABDOMEN: Soft, Nontender, Nondistended; Bowel sounds present, no pain or masses on palpation  NERVOUS SYSTEM:  Alert & Oriented X3  : voiding well  EXTREMITIES:  2+ Peripheral Pulses, No clubbing, cyanosis, or edema  SKIN: warm dry                          16.9   5.59  )-----------( 288      ( 20 Oct 2024 05:20 )             49.3     10-20    141  |  109[H]  |  21[H]  ----------------------------<  122[H]  3.9   |  26  |  1.00    Ca    9.1      20 Oct 2024 05:20  Phos  3.8     10-20  Mg     2.3     10-20    TPro  7.3  /  Alb  3.7  /  TBili  0.5  /  DBili  x   /  AST  22  /  ALT  42  /  AlkPhos  69  10-20    LIVER FUNCTIONS - ( 20 Oct 2024 05:20 )  Alb: 3.7 g/dL / Pro: 7.3 g/dL / ALK PHOS: 69 U/L / ALT: 42 U/L DA / AST: 22 U/L / GGT: x               PT/INR - ( 18 Oct 2024 12:50 )   PT: 11.5 sec;   INR: 0.98 ratio         PTT - ( 18 Oct 2024 12:50 )  PTT:35.3 sec    I&O's Summary          CAPILLARY BLOOD GLUCOSE      RADIOLOGY & ADDITIONAL TESTS:                   PGY-1 Progress Note discussed with attending    PLEASE MS TEAMS AUTHOR TILL 5:00 PM    PLEASE CONTACT ON CALL TEAM:  - On Call Team (Please refer to Christiano) FROM 5:00 PM - 8:30PM  - Nightfloat Team FROM 8:30 -7:30 AM      INTERVAL HPI/OVERNIGHT EVENTS: No acute events overnight.    SUBJECTIVE:  [id number]. Patient seen and examined at bedside this morning. Still feels R-sided decrease in sensation and temperature sensation and weakness. Also c/o intermittent L eye pain radiating to the back of the head and LUE pain.     ---------------------------    REVIEW OF SYSTEMS:  CONSTITUTIONAL: No fever, weight loss, or fatigue  RESPIRATORY: No cough, wheezing, chills or hemoptysis; No shortness of breath  CARDIOVASCULAR: No chest pain, palpitations, dizziness, or leg swelling  GASTROINTESTINAL: No abdominal pain. No nausea, vomiting, or hematemesis; No diarrhea or constipation. No melena or hematochezia.  GENITOURINARY: No dysuria or hematuria, urinary frequency  NEUROLOGICAL: No headaches, memory loss, or tremors. +R-sided loss of sensation, temperature weakness  SKIN: No itching, burning, rashes, or lesions     MEDICATIONS  (STANDING):  aspirin  chewable 81 milliGRAM(s) Oral daily  atorvastatin 80 milliGRAM(s) Oral at bedtime  clopidogrel Tablet 75 milliGRAM(s) Oral daily  enoxaparin Injectable 40 milliGRAM(s) SubCutaneous every 24 hours  pantoprazole    Tablet 40 milliGRAM(s) Oral before breakfast    MEDICATIONS  (PRN):  acetaminophen     Tablet .. 650 milliGRAM(s) Oral every 6 hours PRN Temp greater or equal to 38C (100.4F), Mild Pain (1 - 3), Moderate Pain (4 - 6)      Vital Signs Last 24 Hrs  T(C): 36.7 (20 Oct 2024 07:30), Max: 37 (19 Oct 2024 15:36)  T(F): 98.1 (20 Oct 2024 07:30), Max: 98.6 (19 Oct 2024 15:36)  HR: 77 (20 Oct 2024 07:30) (77 - 100)  BP: 124/86 (20 Oct 2024 07:30) (111/80 - 132/93)  BP(mean): --  RR: 18 (20 Oct 2024 07:30) (18 - 18)  SpO2: 98% (20 Oct 2024 07:30) (95% - 98%)    Parameters below as of 20 Oct 2024 07:30  Patient On (Oxygen Delivery Method): room air        -----------------------------    PHYSICAL EXAMINATION:  GENERAL: NAD, lying in bed  HEAD:  Atraumatic, Normocephalic  EYES:  conjunctiva and sclera clear  NECK: Supple, No JVD  CHEST/LUNG: Clear to auscultation bilaterally; No rales, rhonchi, wheezing, or rubs  HEART: Regular rate and rhythm; No murmurs, rubs, or gallops  ABDOMEN: Soft, Nontender, Nondistended; Bowel sounds present, no pain or masses on palpation  NERVOUS SYSTEM:  Alert & Oriented X3, L facial droop noted, decreased sensation on right V1-3, 3/5 strength on RUE and RLE, decreased sensation to touch on RUE and RLE. Strength 4/5 on LUE and LLE  : voiding well  EXTREMITIES:  2+ Peripheral Pulses, No clubbing, cyanosis, or edema  SKIN: warm dry                          16.9   5.59  )-----------( 288      ( 20 Oct 2024 05:20 )             49.3     10-20    141  |  109[H]  |  21[H]  ----------------------------<  122[H]  3.9   |  26  |  1.00    Ca    9.1      20 Oct 2024 05:20  Phos  3.8     10-20  Mg     2.3     10-20    TPro  7.3  /  Alb  3.7  /  TBili  0.5  /  DBili  x   /  AST  22  /  ALT  42  /  AlkPhos  69  10-20    LIVER FUNCTIONS - ( 20 Oct 2024 05:20 )  Alb: 3.7 g/dL / Pro: 7.3 g/dL / ALK PHOS: 69 U/L / ALT: 42 U/L DA / AST: 22 U/L / GGT: x               PT/INR - ( 18 Oct 2024 12:50 )   PT: 11.5 sec;   INR: 0.98 ratio         PTT - ( 18 Oct 2024 12:50 )  PTT:35.3 sec    I&O's Summary          CAPILLARY BLOOD GLUCOSE      RADIOLOGY & ADDITIONAL TESTS:

## 2024-10-20 NOTE — PROGRESS NOTE ADULT - ATTENDING COMMENTS
Mr. Golden is a 57 y/o Icelandic speaking, male from home w/ PMH of asthma and GERD presents to the ER w/ c/o Rt sided weakness and numbness that started at 7am this morning. His weakness is persistent and has not improved. He reports that he had Rt arm cramp a week ago which resolved. He denies any prior hx of CVA, TIA. Pt denies chest pain, palpitations, sob, or other complaints. Admitted for CVA w/u    Pt was evaluated, ongoing Rt sided weakness, now w/ some sensation in Rt LE. Also c/o left eye pain which is now resolved. Icelandic interpretation provided by daughter at bedside per patient preference     PE as above     Labs reviewed-cbc, bmp, lipid profile     A/P:  #Acute left medullary infarct   #Hyperlipidemia  #Hypertriglyceridemia   #Asthma  #GERD  #DVT ppx     Plan:  -patient p/w RT sided weakness, left facial droop.  -CT head, CT perfusion, CT Angio head and neck- unremarkable except mild to moderate intracranial stenosis   -C/w asa, Plavix, statin   -MR head showed-ACUTE LEFT MEDULLARY INFARCT  -No events on tele so far. Follow ECHO   -Hba1C 6.4%, Lipid profile noted   -Neuro consulted- pending recs   -PT consult- home PT  -SnS eval pending   -c/w  inhalers   -c/w  PPI  -C/w  Lovenox SC   -D/w daughter at bedside.   -Dispo planning

## 2024-10-20 NOTE — PROGRESS NOTE ADULT - PROBLEM SELECTOR PLAN 1
P/w Left  sided facial droop and rigth sided weakness   LKW 7 am   Vitals: Temp 98.1, HR 82, /94  CT brain perfusion neg  CTH neg   s/p reglan   s/p aspirin 325   Code stroke NIHSS   6  called in ED  EKG showing NSR  Lipids elevated  A1c 6.4  SHWETA normal    passed dysphagia screen - however pt endorsing swallowing difficulty - f/u speech and swallow. puree diet + thick liquids for now  -ASA, plavix 75 mg for 3 weeks, atorvastatin 80 mg   -admit to telemetry  -f/u TTE  -f/u MRI Head  Neurology consulted: Dr. Wetzel P/w Left  sided facial droop and rigth sided weakness   LKW 7 am   Vitals: Temp 98.1, HR 82, /94  CT brain perfusion neg  CTH neg   s/p reglan   s/p aspirin 325   Code stroke NIHSS   6  called in ED  EKG showing NSR  Lipids elevated  A1c 6.4  SHWETA normal  MRI brain: acute left medullary infarct    passed dysphagia screen - however pt endorsing swallowing difficulty - f/u speech and swallow. puree diet + thick liquids for now  -ASA, plavix 75 mg for 3 weeks, atorvastatin 80 mg   -admit to telemetry  -f/u TTE  Neurology Dr. Wetzel following

## 2024-10-21 ENCOUNTER — RESULT REVIEW (OUTPATIENT)
Age: 59
End: 2024-10-21

## 2024-10-21 DIAGNOSIS — E78.5 HYPERLIPIDEMIA, UNSPECIFIED: ICD-10-CM

## 2024-10-21 DIAGNOSIS — K59.00 CONSTIPATION, UNSPECIFIED: ICD-10-CM

## 2024-10-21 LAB
ALBUMIN SERPL ELPH-MCNC: 3.4 G/DL — LOW (ref 3.5–5)
ALP SERPL-CCNC: 68 U/L — SIGNIFICANT CHANGE UP (ref 40–120)
ALT FLD-CCNC: 42 U/L DA — SIGNIFICANT CHANGE UP (ref 10–60)
ANION GAP SERPL CALC-SCNC: 6 MMOL/L — SIGNIFICANT CHANGE UP (ref 5–17)
AST SERPL-CCNC: 21 U/L — SIGNIFICANT CHANGE UP (ref 10–40)
BILIRUB SERPL-MCNC: 0.6 MG/DL — SIGNIFICANT CHANGE UP (ref 0.2–1.2)
BUN SERPL-MCNC: 18 MG/DL — SIGNIFICANT CHANGE UP (ref 7–18)
CALCIUM SERPL-MCNC: 9 MG/DL — SIGNIFICANT CHANGE UP (ref 8.4–10.5)
CHLORIDE SERPL-SCNC: 110 MMOL/L — HIGH (ref 96–108)
CO2 SERPL-SCNC: 25 MMOL/L — SIGNIFICANT CHANGE UP (ref 22–31)
CREAT SERPL-MCNC: 0.97 MG/DL — SIGNIFICANT CHANGE UP (ref 0.5–1.3)
EGFR: 89 ML/MIN/1.73M2 — SIGNIFICANT CHANGE UP
GLUCOSE SERPL-MCNC: 115 MG/DL — HIGH (ref 70–99)
HCT VFR BLD CALC: 49.8 % — SIGNIFICANT CHANGE UP (ref 39–50)
HGB BLD-MCNC: 17 G/DL — SIGNIFICANT CHANGE UP (ref 13–17)
MAGNESIUM SERPL-MCNC: 2.2 MG/DL — SIGNIFICANT CHANGE UP (ref 1.6–2.6)
MCHC RBC-ENTMCNC: 32.5 PG — SIGNIFICANT CHANGE UP (ref 27–34)
MCHC RBC-ENTMCNC: 34.1 GM/DL — SIGNIFICANT CHANGE UP (ref 32–36)
MCV RBC AUTO: 95.2 FL — SIGNIFICANT CHANGE UP (ref 80–100)
NRBC # BLD: 0 /100 WBCS — SIGNIFICANT CHANGE UP (ref 0–0)
PHOSPHATE SERPL-MCNC: 3.8 MG/DL — SIGNIFICANT CHANGE UP (ref 2.5–4.5)
PLATELET # BLD AUTO: 255 K/UL — SIGNIFICANT CHANGE UP (ref 150–400)
POTASSIUM SERPL-MCNC: 4 MMOL/L — SIGNIFICANT CHANGE UP (ref 3.5–5.3)
POTASSIUM SERPL-SCNC: 4 MMOL/L — SIGNIFICANT CHANGE UP (ref 3.5–5.3)
PROT SERPL-MCNC: 7.1 G/DL — SIGNIFICANT CHANGE UP (ref 6–8.3)
RBC # BLD: 5.23 M/UL — SIGNIFICANT CHANGE UP (ref 4.2–5.8)
RBC # FLD: 12.4 % — SIGNIFICANT CHANGE UP (ref 10.3–14.5)
SODIUM SERPL-SCNC: 141 MMOL/L — SIGNIFICANT CHANGE UP (ref 135–145)
WBC # BLD: 6.27 K/UL — SIGNIFICANT CHANGE UP (ref 3.8–10.5)
WBC # FLD AUTO: 6.27 K/UL — SIGNIFICANT CHANGE UP (ref 3.8–10.5)

## 2024-10-21 PROCEDURE — 99233 SBSQ HOSP IP/OBS HIGH 50: CPT

## 2024-10-21 PROCEDURE — 99223 1ST HOSP IP/OBS HIGH 75: CPT

## 2024-10-21 PROCEDURE — 99232 SBSQ HOSP IP/OBS MODERATE 35: CPT

## 2024-10-21 PROCEDURE — 93306 TTE W/DOPPLER COMPLETE: CPT | Mod: 26

## 2024-10-21 RX ORDER — POLYETHYLENE GLYCOL 3350 17 G/17G
17 POWDER, FOR SOLUTION ORAL DAILY
Refills: 0 | Status: DISCONTINUED | OUTPATIENT
Start: 2024-10-21 | End: 2024-10-24

## 2024-10-21 RX ORDER — ASPIRIN/MAG CARB/ALUMINUM AMIN 325 MG
324 TABLET ORAL DAILY
Refills: 0 | Status: DISCONTINUED | OUTPATIENT
Start: 2024-10-21 | End: 2024-10-24

## 2024-10-21 RX ADMIN — Medication 80 MILLIGRAM(S): at 21:43

## 2024-10-21 RX ADMIN — CLOPIDOGREL 75 MILLIGRAM(S): 75 TABLET ORAL at 12:20

## 2024-10-21 RX ADMIN — PANTOPRAZOLE SODIUM 40 MILLIGRAM(S): 40 TABLET, DELAYED RELEASE ORAL at 05:56

## 2024-10-21 RX ADMIN — Medication 5 MILLIGRAM(S): at 12:19

## 2024-10-21 RX ADMIN — Medication 81 MILLIGRAM(S): at 12:19

## 2024-10-21 RX ADMIN — POLYETHYLENE GLYCOL 3350 17 GRAM(S): 17 POWDER, FOR SOLUTION ORAL at 12:20

## 2024-10-21 NOTE — PROGRESS NOTE ADULT - SUBJECTIVE AND OBJECTIVE BOX
Neurology Stroke Progress Note    INTERVAL HPI/OVERNIGHT EVENTS:  Patient seen and examined @ bedside with family at bedside. [place]ian peaking, offered  but pt and family requested pts daughter who is at bedside to be the . States he had L sided HA and L teeth discomfort with L eye pain yesterday which has since improved except for some mild L teeth discomfort.     MEDICATIONS  (STANDING):  aspirin  chewable 81 milliGRAM(s) Oral daily  atorvastatin 80 milliGRAM(s) Oral at bedtime  clopidogrel Tablet 75 milliGRAM(s) Oral daily  enoxaparin Injectable 40 milliGRAM(s) SubCutaneous every 24 hours  pantoprazole    Tablet 40 milliGRAM(s) Oral before breakfast  polyethylene glycol 3350 17 Gram(s) Oral daily    MEDICATIONS  (PRN):  acetaminophen     Tablet .. 650 milliGRAM(s) Oral every 6 hours PRN Temp greater or equal to 38C (100.4F), Mild Pain (1 - 3), Moderate Pain (4 - 6)      Allergies  penicillin (Hives; Rash)        Vital Signs Last 24 Hrs  T(C): 36.3 (21 Oct 2024 11:45), Max: 37.1 (21 Oct 2024 08:10)  T(F): 97.3 (21 Oct 2024 11:45), Max: 98.8 (21 Oct 2024 08:10)  HR: 114 (21 Oct 2024 11:45) (83 - 114)  BP: 107/86 (21 Oct 2024 11:45) (107/86 - 138/99)  BP(mean): 95 (21 Oct 2024 11:45) (95 - 103)  RR: 18 (21 Oct 2024 11:45) (18 - 18)  SpO2: 96% (21 Oct 2024 11:45) (95% - 97%)    Parameters below as of 21 Oct 2024 11:45  Patient On (Oxygen Delivery Method): room air        Physical exam:  General: No acute distress, awake and alert  Eyes: Anicteric sclerae, moist conjunctivae, see below for CNs  Neck: trachea midline, FROM, supple.  Cardiovascular: Regular rate and rhythm, S1S2+.   Pulmonary: Anterior breath sounds clear bilaterally, no crackles or wheezing. No use of accessory muscles      Neurologic:  -Mental status: Awake, alert, oriented to person, place, and time. Speech is fluent with intact naming, repetition, and comprehension, Subtle dysarthria+. Recent and remote memory intact. Follows commands. Attention/concentration intact. Fund of knowledge appropriate.    -Cranial nerves:   II: Visual fields are full to confrontation.  III, IV, VI: Extraocular movements are intact without nystagmus. Asymmetric pupils R 6-->5, L 4-->3. L Ptosis+.   V:  Facial sensation V1-V3 decreased on R side.   VII: Face is asymmetric with L NLFF and L Facial droop noted upon activation.   VIII: Hearing is bilaterally intact to finger rub  IX, X: Uvula is midline and soft palate rises symmetrically  XI: Head turning and shoulder shrug are intact.  XII: Tongue protrudes midline  Motor: Normal bulk and tone. RUE and RLE: Briefly antigravity, atleast 3/5, when tested individually RUE Deltoid, Biceps and Triceps : 2/5. R Hip flexion, Knee flexion and extension 2/5 when tested individually.   Rapid alternating movements intact and symmetric.  Sensation: R sided neglect on DSST.   Coordination: No dysmetria on finger-to-nose and heel-to-shin bilaterally on RUE/RLE out of proportion to weakness.   Reflexes: B/L downgoing toes.  Gait: Deferred.    NIHSS: 8  mRS: 0    LABS:                        17.0   6.27  )-----------( 255      ( 21 Oct 2024 05:36 )             49.8     10-21    141  |  110[H]  |  18  ----------------------------<  115[H]  4.0   |  25  |  0.97    Ca    9.0      21 Oct 2024 05:36  Phos  3.8     10-21  Mg     2.2     10-21    TPro  7.1  /  Alb  3.4[L]  /  TBili  0.6  /  DBili  x   /  AST  21  /  ALT  42  /  AlkPhos  68  10-21      Urinalysis Basic - ( 21 Oct 2024 05:36 )    Color: x / Appearance: x / SG: x / pH: x  Gluc: 115 mg/dL / Ketone: x  / Bili: x / Urobili: x   Blood: x / Protein: x / Nitrite: x   Leuk Esterase: x / RBC: x / WBC x   Sq Epi: x / Non Sq Epi: x / Bacteria: x        RADIOLOGY & ADDITIONAL TESTS:     Neurology Stroke Progress Note    INTERVAL HPI/OVERNIGHT EVENTS:  Patient seen and examined @ bedside with family at bedside. [place] peaking, offered  but pt and family requested pts daughter who is at bedside to be the . States he had L sided HA and L teeth discomfort with L eye pain yesterday which has since improved except for some mild L teeth discomfort.     MEDICATIONS  (STANDING):  aspirin  chewable 81 milliGRAM(s) Oral daily  atorvastatin 80 milliGRAM(s) Oral at bedtime  clopidogrel Tablet 75 milliGRAM(s) Oral daily  enoxaparin Injectable 40 milliGRAM(s) SubCutaneous every 24 hours  pantoprazole    Tablet 40 milliGRAM(s) Oral before breakfast  polyethylene glycol 3350 17 Gram(s) Oral daily    MEDICATIONS  (PRN):  acetaminophen     Tablet .. 650 milliGRAM(s) Oral every 6 hours PRN Temp greater or equal to 38C (100.4F), Mild Pain (1 - 3), Moderate Pain (4 - 6)      Allergies  penicillin (Hives; Rash)        Vital Signs Last 24 Hrs  T(C): 36.3 (21 Oct 2024 11:45), Max: 37.1 (21 Oct 2024 08:10)  T(F): 97.3 (21 Oct 2024 11:45), Max: 98.8 (21 Oct 2024 08:10)  HR: 114 (21 Oct 2024 11:45) (83 - 114)  BP: 107/86 (21 Oct 2024 11:45) (107/86 - 138/99)  BP(mean): 95 (21 Oct 2024 11:45) (95 - 103)  RR: 18 (21 Oct 2024 11:45) (18 - 18)  SpO2: 96% (21 Oct 2024 11:45) (95% - 97%)    Parameters below as of 21 Oct 2024 11:45  Patient On (Oxygen Delivery Method): room air        Physical exam:  General: No acute distress, awake and alert  Eyes: Anicteric sclerae, moist conjunctivae, see below for CNs  Neck: trachea midline, FROM, supple.  Cardiovascular: Regular rate and rhythm, S1S2+.   Pulmonary: Anterior breath sounds clear bilaterally, no crackles or wheezing. No use of accessory muscles      Neurologic:  -Mental status: Awake, alert, oriented to person, place, and time. Speech is fluent with intact naming, repetition, and comprehension, Subtle dysarthria+. Recent and remote memory intact. Follows commands. Attention/concentration intact. Fund of knowledge appropriate.    -Cranial nerves:   II: Visual fields are full to confrontation.  III, IV, VI: Extraocular movements are intact without nystagmus. Asymmetric pupils R 6-->5, L 4-->3. L Ptosis+.   V:  Facial sensation V1-V3 decreased on R side.   VII: Face is asymmetric with L NLFF and L Facial droop noted upon activation.   VIII: Hearing is bilaterally intact to finger rub  IX, X: Uvula is midline and soft palate rises symmetrically  XI: Head turning and shoulder shrug are intact.  XII: Tongue protrudes midline  Motor: Normal bulk and tone. RUE and RLE: Briefly antigravity, atleast 3/5, when tested individually RUE Deltoid, Biceps and Triceps : 2/5. R Hip flexion, Knee flexion and extension 2/5 when tested individually.   Rapid alternating movements intact and symmetric.  Sensation: R sided neglect on DSST.   Coordination: No dysmetria on finger-to-nose and heel-to-shin bilaterally on RUE/RLE out of proportion to weakness.   Reflexes: B/L downgoing toes.  Gait: Deferred.    NIHSS: 8  mRS: 0    LABS:                        17.0   6.27  )-----------( 255      ( 21 Oct 2024 05:36 )             49.8     10-21    141  |  110[H]  |  18  ----------------------------<  115[H]  4.0   |  25  |  0.97    Ca    9.0      21 Oct 2024 05:36  Phos  3.8     10-21  Mg     2.2     10-21    TPro  7.1  /  Alb  3.4[L]  /  TBili  0.6  /  DBili  x   /  AST  21  /  ALT  42  /  AlkPhos  68  10-21      Urinalysis Basic - ( 21 Oct 2024 05:36 )    Color: x / Appearance: x / SG: x / pH: x  Gluc: 115 mg/dL / Ketone: x  / Bili: x / Urobili: x   Blood: x / Protein: x / Nitrite: x   Leuk Esterase: x / RBC: x / WBC x   Sq Epi: x / Non Sq Epi: x / Bacteria: x        RADIOLOGY & ADDITIONAL TESTS:    CT Brain Stroke Protocol (10.18.24 @ 12:51)   IMPRESSION:    1. BRAIN:  No intracranial hemorrhage is appreciated.  No intracranial   mass lesion is appreciated.    2.  RIGHT CAROTID SYSTEM:    No hemodynamically significant stenosis.    3.   LEFT CAROTID SYSTEM:     No hemodynamically significant stenosis.    4.   VERTEBRAL CIRCULATION:    Patent.    5.  ANTERIOR INTRACRANIAL CIRCULATION:     Intracranial atherosclerosis   cavernous and clinoid segments of the internal carotid arteries, mild.    6.  POSTERIOR INTRACRANIAL CIRCULATION:    Intracranial atherosclerosis   left vertebral artery, mild to moderate.    7.  No large vessel occlusion    8.  BRAIN PERFUSION:   No acute infarction of the brain is convincingly   demonstrated.           Neurology Stroke Progress Note    INTERVAL HPI/OVERNIGHT EVENTS:  Patient seen and examined @ bedside with family at bedside. [place] peaking, offered  but pt and family requested pts daughter who is at bedside to be the . States he had L sided HA and L teeth discomfort with L eye pain yesterday which has since improved except for some mild L teeth discomfort.     MEDICATIONS  (STANDING):  aspirin  chewable 81 milliGRAM(s) Oral daily  atorvastatin 80 milliGRAM(s) Oral at bedtime  clopidogrel Tablet 75 milliGRAM(s) Oral daily  enoxaparin Injectable 40 milliGRAM(s) SubCutaneous every 24 hours  pantoprazole    Tablet 40 milliGRAM(s) Oral before breakfast  polyethylene glycol 3350 17 Gram(s) Oral daily    MEDICATIONS  (PRN):  acetaminophen     Tablet .. 650 milliGRAM(s) Oral every 6 hours PRN Temp greater or equal to 38C (100.4F), Mild Pain (1 - 3), Moderate Pain (4 - 6)      Allergies  penicillin (Hives; Rash)        Vital Signs Last 24 Hrs  T(C): 36.3 (21 Oct 2024 11:45), Max: 37.1 (21 Oct 2024 08:10)  T(F): 97.3 (21 Oct 2024 11:45), Max: 98.8 (21 Oct 2024 08:10)  HR: 114 (21 Oct 2024 11:45) (83 - 114)  BP: 107/86 (21 Oct 2024 11:45) (107/86 - 138/99)  BP(mean): 95 (21 Oct 2024 11:45) (95 - 103)  RR: 18 (21 Oct 2024 11:45) (18 - 18)  SpO2: 96% (21 Oct 2024 11:45) (95% - 97%)    Parameters below as of 21 Oct 2024 11:45  Patient On (Oxygen Delivery Method): room air        Physical exam:  General: No acute distress, awake and alert  Eyes: Anicteric sclerae, moist conjunctivae, see below for CNs  Neck: trachea midline, FROM, supple.  Cardiovascular: Regular rate and rhythm, S1S2+.   Pulmonary: Anterior breath sounds clear bilaterally, no crackles or wheezing. No use of accessory muscles      Neurologic:  -Mental status: Awake, alert, oriented to person, place, and time. Speech is fluent with intact naming, repetition, and comprehension, Subtle dysarthria+. Recent and remote memory intact. Follows commands. Attention/concentration intact. Fund of knowledge appropriate.    -Cranial nerves:   II: Visual fields are full to confrontation.  III, IV, VI: Extraocular movements are intact without nystagmus. Asymmetric pupils R 6-->5, L 4-->3. L Ptosis+.   V:  Facial sensation V1-V3 decreased on R side.   VII: Face is asymmetric with L NLFF and L Facial droop noted upon activation.   VIII: Hearing is bilaterally intact to finger rub  IX, X: Uvula is midline and soft palate rises symmetrically  XI: Head turning and shoulder shrug are intact.  XII: Tongue protrudes midline  Motor: Normal bulk and tone. RUE and RLE: Briefly antigravity, atleast 3/5, when tested individually RUE Deltoid, Biceps and Triceps : 2/5. R Hip flexion, Knee flexion and extension 2/5 when tested individually.   Rapid alternating movements intact and symmetric.  Sensation: R sided neglect on DSST.   Coordination: No dysmetria on finger-to-nose and heel-to-shin bilaterally on RUE/RLE out of proportion to weakness.   Reflexes: B/L downgoing toes.  Gait: Deferred.    NIHSS: 8  mRS: 0    LABS:                        17.0   6.27  )-----------( 255      ( 21 Oct 2024 05:36 )             49.8     10-21    141  |  110[H]  |  18  ----------------------------<  115[H]  4.0   |  25  |  0.97    Ca    9.0      21 Oct 2024 05:36  Phos  3.8     10-21  Mg     2.2     10-21    TPro  7.1  /  Alb  3.4[L]  /  TBili  0.6  /  DBili  x   /  AST  21  /  ALT  42  /  AlkPhos  68  10-21      Urinalysis Basic - ( 21 Oct 2024 05:36 )    Color: x / Appearance: x / SG: x / pH: x  Gluc: 115 mg/dL / Ketone: x  / Bili: x / Urobili: x   Blood: x / Protein: x / Nitrite: x   Leuk Esterase: x / RBC: x / WBC x   Sq Epi: x / Non Sq Epi: x / Bacteria: x        RADIOLOGY & ADDITIONAL TESTS:    CT Brain Stroke Protocol (10.18.24 @ 12:51)   IMPRESSION:    1. BRAIN:  No intracranial hemorrhage is appreciated.  No intracranial   mass lesion is appreciated.    2.  RIGHT CAROTID SYSTEM:    No hemodynamically significant stenosis.    3.   LEFT CAROTID SYSTEM:     No hemodynamically significant stenosis.    4.   VERTEBRAL CIRCULATION:    Patent.    5.  ANTERIOR INTRACRANIAL CIRCULATION:     Intracranial atherosclerosis   cavernous and clinoid segments of the internal carotid arteries, mild.    6.  POSTERIOR INTRACRANIAL CIRCULATION:    Intracranial atherosclerosis   left vertebral artery, mild to moderate.    7.  No large vessel occlusion    8.  BRAIN PERFUSION:   No acute infarction of the brain is convincingly   demonstrated.      MR Head w/ IV Cont (10.20.24 @ 11:44)   IMPRESSION: ACUTE LEFT MEDULLARY INFARCT. NO ACUTE INTRACRANIAL   HEMORRHAGE.    TTE W or WO Ultrasound Enhancing Agent (10.21.24 @ 09:23)   CONCLUSIONS:      1. Left ventricular cavity is small. Left ventricular wall thickness is mildly increased. Left ventricularsystolic function is hyperdynamic with an ejection fraction of 74 % by Otero's method of disks.   2. Near complete mid cavity obliteration without significant gradient.   3. There is mild (grade 1) left ventricular diastolic dysfunction.   4. Normal right ventricular cavity size and normal right ventricular systolic function.   5. Trileaflet aortic valve. Fibrocalcific aortic valve sclerosis without stenosis.   6. Structurally normal mitral valve with normal leaflet excursion.   7. No mitral regurgitation.   8. Agitated saline injection reveals bubbles in the left heart, consistent with a patent foramen ovale.   9. No pericardial effusion seen.  10. No prior echocardiogram is available for comparison.

## 2024-10-21 NOTE — PROGRESS NOTE ADULT - ASSESSMENT
Assessment and Plan :     Assessment:   58 R hand dominant Male w/PMH of asthma and GERD presented to ED w/ R sided weakness/numbness.     58-year-old male, AAOx3, Walks independently with past medical history significant for asthma and GERD  presenting with ight-sided numbness.  Patient states that he noticed this morning at 7 AM, he had numbness from the right elbow to his right hand, as well as  to his right lower extremity, from the right knee to the right foot. CT brain perfusion neg. CTH neg. Vitals wnl and labs wnl. Patient is being admitted for CVA work up.    Assessment and Plan :     Assessment:   58 R hand dominant Male w/PMH of asthma and GERD presented to ED w/ R sided weakness/[id number] on the day of admission. CTH negative for any acute intracranial pathology, CTA H/N w/no LVO or HGS and a negative CTP. Admitted to [facility] for further workup. 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     Impression : Acute L medullary infarct in MRI likely could be 2/2 small vessel disease i/s/o vascular risk factors, but TTE w/PFO +. Pending B/L LE doppler (DVT screen)      Reccs:     - MRI : Acute L medullary infarct  - TTE: PFO+  - Please obtain B/L LE doppler.  - Given PFO in TTE, would recommend switching from DAPT (ASA + Plavix) to ASA 325mg po daily (long term) if B/L LE doppler is negative.  - If B/L LE doppler is +, then would need to consider AC.   - On HIS - Atorva 80mg po QHS, Titrate to LDL < 70mg/dl.  - Please obtain cardiology consult for TTE results.   - Stroke labs :   HbA1C: 6.4  LDL: 101  - Will need Outpt Neuro F/U in 2 weeks upon D/C.   - PT/OT eval and follow up.   - Further care per primary team.       Discussed with Neuro attending Dr. Wetzel.     Assessment and Plan :     Assessment:   58 R hand dominant Male w/PMH of asthma and GERD presented to ED w/ R sided weakness/[id number] on the day of admission. CTH negative for any acute intracranial pathology, CTA H/N w/no LVO or HGS and a negative CTP. Admitted to [facility] for further workup. 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     Impression : Acute L medullary infarct in MRI likely could be 2/2 small vessel disease i/s/o vascular risk factors, but TTE w/PFO +. Pending B/L LE doppler (DVT screen)      Reccs:     - MRI : Acute L medullary infarct  - TTE: PFO+  - Please obtain B/L LE doppler.  - Given PFO in TTE, would recommend switching from DAPT (ASA + Plavix) to ASA 325mg po daily (long term) if B/L LE doppler is negative. Pt is on DAPT, If he did receive todays dose, can switch to full dose ASA from tomorrow(10/22/24).  - If B/L LE doppler is +, then would need to consider AC.   - On HIS - Atorva 80mg po QHS, Titrate to LDL < 70mg/dl.  - Please obtain cardiology consult for TTE results.   - Stroke labs :   HbA1C: 6.4  LDL: 101  - Will need Outpt Neuro F/U in 2 weeks upon D/C.   - PT/OT eval and follow up.   - Further care per primary team.       Discussed with Neuro attending Dr. Wetzel.     Assessment and Plan :     Assessment:   58 R hand dominant Male w/PMH of asthma and GERD presented to ED w/ R sided weakness/[id number] on the day of admission. CTH negative for any acute intracranial pathology, CTA H/N w/no LVO or HGS and a negative CTP. Admitted to [facility] for further workup. 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     Impression : Acute L medullary infarct in MRI likely could be 2/2 small vessel disease i/s/o vascular risk factors, but TTE w/PFO +. Pending B/L LE doppler (DVT screen)      Reccs:     - MRI : Acute L medullary infarct  - TTE: PFO+  - Please obtain B/L LE doppler.  - Given PFO in TTE, would recommend switching from DAPT (ASA + Plavix) to ASA 325mg po daily (long term) if B/L LE doppler is negative. Pt is on DAPT, If he did receive todays dose, can switch to full dose ASA from tomorrow(10/22/24).  - If B/L LE doppler is +, then would need to consider AC.   - On HIS - Atorva 80mg po QHS, Titrate to LDL < 70mg/dl.  - Please obtain cardiology consult for TTE results.   - Consider Outpt ILR per STROKE AF.   - Stroke labs :   HbA1C: 6.4  LDL: 101  - Will need Outpt Neuro F/U in 2 weeks upon D/C.   - Outpt Ophthal and Dental eval.  - PT/OT eval and follow up.   - Further care per primary team.       Discussed with Neuro attending Dr. Wetzel.

## 2024-10-21 NOTE — PROGRESS NOTE ADULT - SUBJECTIVE AND OBJECTIVE BOX
PGY-1 Progress Note discussed with attending      PLEASE CONTACT ON CALL TEAM:  - On Call Team (Please refer to Christiano) FROM 5:00 PM - 8:30PM  - Nightfloat Team FROM 8:30 -7:30 AM    INTERVAL HPI/OVERNIGHT EVENTS:     No acute overnight events. Pt evaluated at bedisde. Pt has no new complaints. Reports improvement in weakness of RUE and RLE.      MEDICATIONS  (STANDING):  aspirin  chewable 81 milliGRAM(s) Oral daily  atorvastatin 80 milliGRAM(s) Oral at bedtime  clopidogrel Tablet 75 milliGRAM(s) Oral daily  enoxaparin Injectable 40 milliGRAM(s) SubCutaneous every 24 hours  pantoprazole    Tablet 40 milliGRAM(s) Oral before breakfast    MEDICATIONS  (PRN):  acetaminophen     Tablet .. 650 milliGRAM(s) Oral every 6 hours PRN Temp greater or equal to 38C (100.4F), Mild Pain (1 - 3), Moderate Pain (4 - 6)      Vital Signs Last 24 Hrs  T(C): 37.1 (21 Oct 2024 08:10), Max: 37.1 (21 Oct 2024 08:10)  T(F): 98.8 (21 Oct 2024 08:10), Max: 98.8 (21 Oct 2024 08:10)  HR: 83 (21 Oct 2024 08:10) (83 - 104)  BP: 726/92 (21 Oct 2024 08:10) (109/91 - 726/92)  BP(mean): 103 (21 Oct 2024 08:10) (103 - 103)  RR: 18 (21 Oct 2024 08:10) (18 - 18)  SpO2: 96% (21 Oct 2024 08:10) (95% - 97%)    Parameters below as of 21 Oct 2024 08:10  Patient On (Oxygen Delivery Method): room air        PHYSICAL EXAMINATION:  GENERAL: NAD  HEAD:  Atraumatic, Normocephalic  EYES:  conjunctiva and sclera clear  NECK: Supple  CHEST/LUNG: Clear to auscultation  HEART: Regular rate and rhythm; No murmurs, rubs, or gallops  ABDOMEN: Soft, Nontender, Bowel sounds present, no masses on palpation  NERVOUS SYSTEM:  Alert and oriented. L facial droop, weakness of RUE and RLE, decreased sensation to touch on RUE and RLE. 4/5 strength of RLE, RUE, sensation intact.  EXTREMITIES:  No BLE edema  SKIN: warm, dry                          17.0   6.27  )-----------( 255      ( 21 Oct 2024 05:36 )             49.8     10-21    141  |  110[H]  |  18  ----------------------------<  115[H]  4.0   |  25  |  0.97    Ca    9.0      21 Oct 2024 05:36  Phos  3.8     10-21  Mg     2.2     10-21    TPro  7.1  /  Alb  3.4[L]  /  TBili  0.6  /  DBili  x   /  AST  21  /  ALT  42  /  AlkPhos  68  10-21    LIVER FUNCTIONS - ( 21 Oct 2024 05:36 )  Alb: 3.4 g/dL / Pro: 7.1 g/dL / ALK PHOS: 68 U/L / ALT: 42 U/L DA / AST: 21 U/L / GGT: x                   I&O's Summary    20 Oct 2024 07:01  -  21 Oct 2024 07:00  --------------------------------------------------------  IN: 120 mL / OUT: 750 mL / NET: -630 mL

## 2024-10-21 NOTE — PROGRESS NOTE ADULT - PROBLEM SELECTOR PLAN 1
P/w Left  sided facial droop and rigth sided weakness   LKW 7 am   Vitals: Temp 98.1, HR 82, /94  CT brain perfusion neg  CTH neg   s/p reglan   s/p aspirin 325   Code stroke NIHSS   6  called in ED  EKG showing NSR  Lipids elevated  A1c 6.4  SHWETA normal  MRI brain: acute left medullary infarct    passed dysphagia screen - however pt endorsing swallowing difficulty - f/u speech and swallow. puree diet + thick liquids for now  -ASA, plavix 75 mg for 3 weeks, atorvastatin 80 mg   -admit to telemetry  -f/u TTE  Neurology Dr. Wetzel following P/w Left  sided facial droop and rigth sided weakness   LKW 7 am   Vitals: Temp 98.1, HR 82, /94  CT brain perfusion neg  CTH neg   s/p reglan   s/p aspirin 325   Code stroke NIHSS   6  called in ED  EKG showing NSR  Lipids elevated  A1c 6.4  SHWETA normal  MRI brain: acute left medullary infarct    passed dysphagia screen - however pt endorsing swallowing difficulty   - f/u speech and swallow. puree diet + thick liquids for now  -ASA, plavix 75 mg for 3 weeks, atorvastatin 80 mg   -admit to telemetry  -f/u TTE, ordered  Neurology Dr. Wetzel following P/w Left  sided facial droop and rigth sided weakness   LKW 7 am   Vitals: Temp 98.1, HR 82, /94  CT brain perfusion neg  CTH neg   s/p reglan   s/p aspirin 325   Code stroke NIHSS   6  called in ED  EKG showing NSR  Lipids elevated  A1c 6.4  SHWETA normal  MRI brain: acute left medullary infarct  passed dysphagia screen - however pt endorsing swallowing difficulty   - f/u speech and swallow. puree diet + thick liquids for now  -ASA, plavix 75 mg for 3 weeks, atorvastatin 80 mg   -admit to telemetry  -f/u TTE, ordered  Neurology Dr. Wetzel following

## 2024-10-21 NOTE — CONSULT NOTE ADULT - SUBJECTIVE AND OBJECTIVE BOX
CHIEF COMPLAINT: right upper and lower extremities numbness    Pt. Panamanian speaking. HPI and ROS taken with assistance of daughter Samuel at bedside    HPI:  58-year-old male, AAOx3, walks independently with past medical history significant for asthma and GERD  presenting with Right-sided numbness.  Patient states that he noticed he had numbness from the right elbow to his right hand, as well as  to his right lower extremity, from the right knee to the right foot.  Patient states that numbness has progressed to involve his entire right side of his body. Patient also reported he had left sided facial droop and swelling of his left eye.  He does endorse mild headache as well as nausea. Patient denies chest pain, palpitations, syncope, shortness of breath, LE edema, PND/orthopnea.        PAST MEDICAL & SURGICAL HISTORY:  Asthma  GERD (gastroesophageal reflux disease)  Hernia    Allergies    penicillin (Hives; Rash)    Intolerances        MEDICATIONS  (STANDING):  aspirin  chewable 324 milliGRAM(s) Oral daily  atorvastatin 80 milliGRAM(s) Oral at bedtime  pantoprazole    Tablet 40 milliGRAM(s) Oral before breakfast  polyethylene glycol 3350 17 Gram(s) Oral daily    MEDICATIONS  (PRN):  acetaminophen     Tablet .. 650 milliGRAM(s) Oral every 6 hours PRN Temp greater or equal to 38C (100.4F), Mild Pain (1 - 3), Moderate Pain (4 - 6)      FAMILY HISTORY:  Family history of irritable colon (Mother)        ***No family history of premature coronary artery disease or sudden cardiac death    SOCIAL HISTORY:  Smoking-denies  Alcohol-drinks beer and hard liqueur on weekends  Illicit Drug use-denies    REVIEW OF SYSTEMS:  Constitutional: [ ] fever, [ ]weight loss,  [ ]fatigue  Eyes: [ ] visual changes  Respiratory: [ ]shortness of breath;  [ ] cough, [ ]wheezing, [ ]chills, [ ]hemoptysis  Cardiovascular: [ ] chest pain, [ ]palpitations, [ ]dizziness,  [ ]leg swelling [ ]syncope  Gastrointestinal: [ ] abdominal pain, [ ]nausea, [ ]vomiting,  [ ]diarrhea   Genitourinary: [ ] dysuria, [ ] hematuria  Neurologic: [ X] headaches [ ] tremors  [ ] weakness [ ] lightheadedness [x ]numbness right upper and lower extremities  Skin: [ ] itching, [ ]burning, [ ] rashes  Endocrine: [ ] heat or cold intolerance  Musculoskeletal: [ ] joint pain or swelling; [ ] muscle, back, or extremity pain  Psychiatric: [ ] depression, [ ]anxiety, [ ]mood swings, or [ ]difficulty sleeping  Hematologic: [ ] easy bruising, [ ] bleeding gums     [ x] All others negative	  [ ] Unable to obtain    Vital Signs Last 24 Hrs  T(C): 36.8 (21 Oct 2024 15:45), Max: 37.1 (21 Oct 2024 08:10)  T(F): 98.2 (21 Oct 2024 15:45), Max: 98.8 (21 Oct 2024 08:10)  HR: 120 (21 Oct 2024 15:45) (83 - 120)  BP: 115/88 (21 Oct 2024 15:45) (107/86 - 138/99)  BP(mean): 95 (21 Oct 2024 11:45) (95 - 103)  RR: 19 (21 Oct 2024 15:45) (18 - 19)  SpO2: 98% (21 Oct 2024 15:45) (96% - 98%)    Parameters below as of 21 Oct 2024 15:45  Patient On (Oxygen Delivery Method): room air      I&O's Summary    20 Oct 2024 07:01  -  21 Oct 2024 07:00  --------------------------------------------------------  IN: 120 mL / OUT: 750 mL / NET: -630 mL        PHYSICAL EXAM:  General: No acute distress  HEENT: EOMI  Neck:  No JVD  Lungs: Clear to auscultation bilaterally; No rales or wheezing  Heart: Regular rate and rhythm; No murmurs, rubs, or gallops  Abdomen: soft, non tender, non distended   Extremities: warm, no edema   Nervous system:  Alert & Oriented X3  Psychiatric: Normal affect  Skin: No rashes or lesions    LABS:  10-21    141  |  110[H]  |  18  ----------------------------<  115[H]  4.0   |  25  |  0.97    Ca    9.0      21 Oct 2024 05:36  Phos  3.8     10-21  Mg     2.2     10-21    TPro  7.1  /  Alb  3.4[L]  /  TBili  0.6  /  DBili  x   /  AST  21  /  ALT  42  /  AlkPhos  68  10-21    Creatinine Trend: 0.97<--, 1.00<--, 0.93<--, 0.86<--, 0.93<--                        17.0   6.27  )-----------( 255      ( 21 Oct 2024 05:36 )             49.8         Lipid Panel:   Cardiac Enzymes:           RADIOLOGY:< from: MR Head w/ IV Cont (10.20.24 @ 11:44) >    IMPRESSION: ACUTE LEFT MEDULLARY INFARCT. NO ACUTE INTRACRANIAL   HEMORRHAGE.    --- End of Report ---    < end of copied text >  < from:  Physiol Extremity Lower 3+ Level, BI (10.18.24 @ 21:15) >  Findings/  Impression:  Right lower extremity: The ankle brachial index is 1.31. TBI is 0.95 with   digital pressures of 125 mmHg. The pulse waveforms are normal. No   pressure gradient.    Left lower extremity: The ankle brachial index is 1.30. TBI is 0.92 with   digital pressures of 121 mmHg. The pulse waveforms are normal. No   pressure gradient.    --- End of Report ---    < end of copied text >  < from: CT Angio Neck Stroke Protocol w/ IV Cont (10.18.24 @ 13:02) >  IMPRESSION:    1. BRAIN:  No intracranial hemorrhage is appreciated.  No intracranial   mass lesion is appreciated.    2.  RIGHT CAROTID SYSTEM:    No hemodynamically significant stenosis.    3.   LEFT CAROTID SYSTEM:     No hemodynamically significant stenosis.    4.   VERTEBRAL CIRCULATION:    Patent.    5.  ANTERIOR INTRACRANIAL CIRCULATION:     Intracranial atherosclerosis   cavernous and clinoid segments of the internal carotid arteries, mild.    6.  POSTERIOR INTRACRANIAL CIRCULATION:    Intracranial atherosclerosis   left vertebral artery, mild to moderate.    7.  No large vessel occlusion    8.  BRAIN PERFUSION:   No acute infarction of the brain is convincingly   demonstrated.      CRITICAL VALUE:    I discussed this case with the treating emergency   department physician at  10/18/2024 1:35 PM.  Hospital policies for   critical values including read back policy were followed.  The verbal   communication of the critical value supplements this written report.    --- End of Report ---            ZURI ESTRELLA MD; Attending Radiologist  This document has been electronically signed. Oct 18 2024  1:48PM    < end of copied text >  < from: CT Head No Cont (10.17.24 @ 14:04) >    IMPRESSION:  1.  No acute intracranial hemorrhage, mass effect, or midline shift.  2.  Chronic small vessel disease. If there is concern for acute   neurologic compromise, recommend MRI of the brain for further evaluation.        --- End of Report ---          < end of copied text >      ECG [my interpretation]: 10/18/2024 13:10:07    TELEMETRY: NSR, Sinus tachy HR range 65-140s    ECHO: < from: TTE W or WO Ultrasound Enhancing Agent (10.21.24 @ 09:23) >  CONCLUSIONS:      1. Left ventricular cavity is small. Left ventricular wall thickness is mildly increased. Left ventricularsystolic function is hyperdynamic with an ejection fraction of 74 % by Otero's method of disks.   2. Near complete mid cavity obliteration without significant gradient.   3. There is mild (grade 1) left ventricular diastolic dysfunction.   4. Normal right ventricular cavity size and normal right ventricular systolic function.   5. Trileaflet aortic valve. Fibrocalcific aortic valve sclerosis without stenosis.   6. Structurally normal mitral valve with normal leaflet excursion.   7. No mitral regurgitation.   8. Agitated saline injection reveals bubbles in the left heart, consistent with a patent foramen ovale.   9. No pericardial effusion seen.  10. No prior echocardiogram is available for comparison.    ________________________________________________________________________________________  FINDINGS:     Left Ventricle:  A false tendon (normal anatomic variant) is noted in the left ventricular cavity. The left ventricular cavity is small. Left ventricular wall thickness is mildly increased. Left ventricular systolic function is hyperdynamic with a calculated ejection fraction of 74 % by the Otero's biplane method of disks. There are no regional wall motion abnormalities seen. Near complete mid cavity obliteration without significant gradient. There is mild (grade 1) left ventricular diastolic dysfunction.     Right Ventricle:  The right ventricular cavity is normal in size and right ventricular systolic function is normal. Tricuspid annular plane systolic excursion (TAPSE) is 2.0 cm (normal >=1.7 cm).     Left Atrium:  The left atrium is normal in size with an indexed volume of 10.78 ml/m².     Right Atrium:  The right atrium is normal in size.     Interatrial Septum:  Agitated saline injection reveals bubbles in the left heart, consistent with a patent foramen ovale.     Aortic Valve:  The aortic valve appears trileaflet. There is fibrocalcific aortic valve sclerosis without stenosis. There is no evidence of aortic regurgitation.     Mitral Valve:  Structurally normal mitral valve with normal leaflet excursion. There is no evidence of mitral regurgitation.     Tricuspid Valve:  Structurally normal tricuspid valve with normal leaflet excursion. There is trace tricuspid regurgitation. There is no echocardiographicevidence of pulmonary hypertension. Estimated pulmonary artery systolic pressure is 24 mmHg.     Pulmonic Valve:  Structurally normal pulmonic valve with normal leaflet excursion. There is trace pulmonic regurgitation.     Aorta:  The aortic root andascending aorta appear normal in size.     Pericardium:  No pericardial effusion seen.     Systemic Veins:  The inferior vena cava is normal in size measuring 0.90 cm in diameter, (normal <2.1cm) with normal inspiratory collapse (normal >50%) consistent with normal right atrial pressure (~3, range 0-5mmHg).  ____________________________________________________________________    < end of copied text >

## 2024-10-21 NOTE — PROGRESS NOTE ADULT - ATTENDING COMMENTS
Mr. Golden is a 57 y/o Haitian speaking, male from home w/ PMH of asthma and GERD presents to the ER w/ c/o Rt sided weakness and numbness that started at 7am this morning. His weakness is persistent and has not improved. He reports that he had Rt arm cramp a week ago which resolved. He denies any prior hx of CVA, TIA. Pt denies chest pain, palpitations, sob, or other complaints. Admitted for CVA w/u    Pt was evaluated, ongoing Rt sided weakness. Also complained of constipation.     PE as above     Labs reviewed-cbc, bmp    A/P:  #Acute left medullary infarct   #PFO  #Hyperlipidemia  #Hypertriglyceridemia   #Asthma  #GERD  #DVT ppx     Plan:  -patient p/w RT sided weakness, left facial droop.  -CT head, CT perfusion, CT Angio head and neck- unremarkable except mild to moderate intracranial stenosis   -MR head showed-ACUTE LEFT MEDULLARY INFARCT  -No events on tele so far.   -ECHO w/ PFO.  Check US duplex to r/o DVT. Cards consulted, plan for CARLOS ALBERTO in am   -D/w neuro, change to asa 325 and statin   -Hba1C 6.4%, Lipid profile noted   -PT consult- home PT  -SnS eval pending   -c/w  inhalers   -c/w  PPI  -C/w  Lovenox SC   -D/w daughter at bedside.

## 2024-10-22 ENCOUNTER — RESULT REVIEW (OUTPATIENT)
Age: 59
End: 2024-10-22

## 2024-10-22 LAB
ANION GAP SERPL CALC-SCNC: 5 MMOL/L — SIGNIFICANT CHANGE UP (ref 5–17)
BUN SERPL-MCNC: 17 MG/DL — SIGNIFICANT CHANGE UP (ref 7–18)
CALCIUM SERPL-MCNC: 9.3 MG/DL — SIGNIFICANT CHANGE UP (ref 8.4–10.5)
CHLORIDE SERPL-SCNC: 108 MMOL/L — SIGNIFICANT CHANGE UP (ref 96–108)
CO2 SERPL-SCNC: 26 MMOL/L — SIGNIFICANT CHANGE UP (ref 22–31)
CREAT SERPL-MCNC: 0.94 MG/DL — SIGNIFICANT CHANGE UP (ref 0.5–1.3)
EGFR: 93 ML/MIN/1.73M2 — SIGNIFICANT CHANGE UP
GLUCOSE SERPL-MCNC: 109 MG/DL — HIGH (ref 70–99)
HCT VFR BLD CALC: 47.9 % — SIGNIFICANT CHANGE UP (ref 39–50)
HGB BLD-MCNC: 16.6 G/DL — SIGNIFICANT CHANGE UP (ref 13–17)
MAGNESIUM SERPL-MCNC: 2.3 MG/DL — SIGNIFICANT CHANGE UP (ref 1.6–2.6)
MCHC RBC-ENTMCNC: 32.6 PG — SIGNIFICANT CHANGE UP (ref 27–34)
MCHC RBC-ENTMCNC: 34.7 GM/DL — SIGNIFICANT CHANGE UP (ref 32–36)
MCV RBC AUTO: 94.1 FL — SIGNIFICANT CHANGE UP (ref 80–100)
NRBC # BLD: 0 /100 WBCS — SIGNIFICANT CHANGE UP (ref 0–0)
PHOSPHATE SERPL-MCNC: 3.6 MG/DL — SIGNIFICANT CHANGE UP (ref 2.5–4.5)
PLATELET # BLD AUTO: 263 K/UL — SIGNIFICANT CHANGE UP (ref 150–400)
POTASSIUM SERPL-MCNC: 4 MMOL/L — SIGNIFICANT CHANGE UP (ref 3.5–5.3)
POTASSIUM SERPL-SCNC: 4 MMOL/L — SIGNIFICANT CHANGE UP (ref 3.5–5.3)
RBC # BLD: 5.09 M/UL — SIGNIFICANT CHANGE UP (ref 4.2–5.8)
RBC # FLD: 12.5 % — SIGNIFICANT CHANGE UP (ref 10.3–14.5)
SODIUM SERPL-SCNC: 139 MMOL/L — SIGNIFICANT CHANGE UP (ref 135–145)
WBC # BLD: 7.23 K/UL — SIGNIFICANT CHANGE UP (ref 3.8–10.5)
WBC # FLD AUTO: 7.23 K/UL — SIGNIFICANT CHANGE UP (ref 3.8–10.5)

## 2024-10-22 PROCEDURE — 93325 DOPPLER ECHO COLOR FLOW MAPG: CPT | Mod: 26

## 2024-10-22 PROCEDURE — 93970 EXTREMITY STUDY: CPT | Mod: 26

## 2024-10-22 PROCEDURE — 99233 SBSQ HOSP IP/OBS HIGH 50: CPT | Mod: GC

## 2024-10-22 PROCEDURE — 93312 ECHO TRANSESOPHAGEAL: CPT | Mod: 26

## 2024-10-22 PROCEDURE — 93320 DOPPLER ECHO COMPLETE: CPT | Mod: 26

## 2024-10-22 PROCEDURE — 99232 SBSQ HOSP IP/OBS MODERATE 35: CPT

## 2024-10-22 PROCEDURE — 99233 SBSQ HOSP IP/OBS HIGH 50: CPT | Mod: 25,57

## 2024-10-22 RX ORDER — ENOXAPARIN SODIUM 40MG/0.4ML
40 SYRINGE (ML) SUBCUTANEOUS EVERY 24 HOURS
Refills: 0 | Status: DISCONTINUED | OUTPATIENT
Start: 2024-10-22 | End: 2024-10-24

## 2024-10-22 RX ADMIN — Medication 650 MILLIGRAM(S): at 19:02

## 2024-10-22 RX ADMIN — Medication 324 MILLIGRAM(S): at 12:58

## 2024-10-22 RX ADMIN — Medication 80 MILLIGRAM(S): at 21:21

## 2024-10-22 RX ADMIN — Medication 650 MILLIGRAM(S): at 18:13

## 2024-10-22 RX ADMIN — PANTOPRAZOLE SODIUM 40 MILLIGRAM(S): 40 TABLET, DELAYED RELEASE ORAL at 06:04

## 2024-10-22 RX ADMIN — Medication 40 MILLIGRAM(S): at 21:21

## 2024-10-22 RX ADMIN — POLYETHYLENE GLYCOL 3350 17 GRAM(S): 17 POWDER, FOR SOLUTION ORAL at 12:58

## 2024-10-22 NOTE — SWALLOW BEDSIDE ASSESSMENT ADULT - SLP PERTINENT HISTORY OF CURRENT PROBLEM
58-year-old male, AAOx3, Walks independently with past medical history significant for asthma and GERD  presenting with Right-sided numbness.  Patient states that he noticed this morning at 7 AM, he had numbness from the right elbow to his right hand, as well as  to his right lower extremity, from the right knee to the right foot.  Patient states that now, numbness has progressed to involve his entire right side of his body. Patient also reports he has Left sided facial droop and swelling of his left eye.  He does endorse mild headache as well as nausea. But Denies any dizziness, CP, SOB, abdominal pain, vomiting diarrhea, or dysuria . No recent travel or sick contacts. In ED, temp 98.1, HR 82, /94. Labs unremarkable.  CT brain perfusion was unremarkable. CT head was unremarkable. Pt was admitted to telemetry for CVA workup. Neurology and cardiology were consulted. MRI showed acute left medullary infarct. TTE showed PFO. CARLOS ALBERTO was negative for clot.

## 2024-10-22 NOTE — CONSULT NOTE ADULT - NS ATTEND AMEND GEN_ALL_CORE FT
Patient with CVA. No evidence of AF. Will plan for ILR placement on Thursday.
58 year old M with HLD, preDM who was found to have acute L medullary CVA, incidentally found to have positive PFO.    1) L Medullary CVA  2) HLD, pre-DM  - EKG showed sinus rhythm without significant abnormalities. Thus far, telemetry has been sinus rhythm  - As discussed with neurology Dr. Wetzel, concern for cardioembolic source of stroke  - Agree with LE Duplex  - Will tentatively plan for CARLOS ALBERTO tomorrow to r/o LA thrombus and other cardioembolic source of stroke. Please make patient NPO at [place]. If CARLOS ALBERTO is negative, will have EP evaluate for ILR  - Continue anti-platelets and statin as per neurology    Plan discussed with medicine team and neurology

## 2024-10-22 NOTE — PROGRESS NOTE ADULT - PROBLEM SELECTOR PLAN 1
-s/p CARLOS ALBERTO- negative for LA thrombus, concern for cardioembolic source of stroke  -EP eval for ILR placement  - secondary stroke prevention  -Will need outpatient  PFO closure work up upon discharge.

## 2024-10-22 NOTE — PROGRESS NOTE ADULT - PROBLEM SELECTOR PLAN 1
P/w Left  sided facial droop and rigth sided weakness   MRI brain: acute left medullary infarct  TTE: PFO  - per Neuro:  ASA 325mg po daily (long term)  - atorvastatin 80  Neurology Dr. Wetzel following  - F/u CARLOS ALBERTO, if negative then will need loop recorder  - F/u BLE doppler  Cardiology Dr Poole

## 2024-10-22 NOTE — PROGRESS NOTE ADULT - ASSESSMENT
58-year-old male, AAOx3, walks independently with past medical history significant for asthma and GERD  presenting with Right-sided numbness.  Patient noted with +PFO on echo. Cardiology was consulted      ·  Problem: CVA (cerebrovascular accident).   ·  Recommendation: -not confirmed if cardioembolic  -Keep pt NPO after midnight for possible CARLOS ALBERTO tomorrow  -if CARLOS ALBERTO negative, EP eval for ILR placement  - secondary stroke prevention  -Will need outpatient  PFO work up upon discharge.     Problem/Recommendation - 2:  ·  Problem: HLD (hyperlipidemia).   ·  Recommendation: -continue Atorvastatin 80 mg PO QHS.    Attestation Statements:    Attestation Statements:  Attending and PA/NP shared services statement (NON-critical care):     Attending to bill.     I independently performed the documented:     History, Exam and Medical decision making.     I have made amendments to the documentation where necessary. Additional comments: 58 year old M with HLD, preDM who was found to have acute L medullary CVA, incidentally found to have positive PFO.    1) L Medullary CVA  2) HLD, pre-DM  - EKG showed sinus rhythm without significant abnormalities. Thus far, telemetry has been sinus rhythm  - As discussed with neurology Dr. Wetzel, concern for cardioembolic source of stroke  - Agree with LE Duplex  - Will tentatively plan for CARLOS ALBERTO tomorrow to r/o LA thrombus and other cardioembolic source of stroke. Please make patient NPO at [place]. If CARLOS ALBERTO is negative, will have EP evaluate for ILR  - Continue anti-platelets and statin as per neurology       58-year-old male, AAOx3, walks independently with past medical history significant for asthma and GERD  presenting with Right-sided numbness.  Patient noted with +PFO on echo. Cardiology was consulted

## 2024-10-22 NOTE — CONSULT NOTE ADULT - SUBJECTIVE AND OBJECTIVE BOX
CHIEF COMPLAINT:    HPI:    PAST MEDICAL & SURGICAL HISTORY:  Asthma      GERD (gastroesophageal reflux disease)      Hernia          Allergies    penicillin (Hives; Rash)    Intolerances        MEDICATIONS  (STANDING):  aspirin  chewable 324 milliGRAM(s) Oral daily  atorvastatin 80 milliGRAM(s) Oral at bedtime  enoxaparin Injectable 40 milliGRAM(s) SubCutaneous every 24 hours  pantoprazole    Tablet 40 milliGRAM(s) Oral before breakfast  polyethylene glycol 3350 17 Gram(s) Oral daily    MEDICATIONS  (PRN):  acetaminophen     Tablet .. 650 milliGRAM(s) Oral every 6 hours PRN Temp greater or equal to 38C (100.4F), Mild Pain (1 - 3), Moderate Pain (4 - 6)      FAMILY HISTORY:  Family history of irritable colon (Mother)        ***No family history of premature coronary artery disease or sudden cardiac death    SOCIAL HISTORY:  Smoking-  Alcohol-  Illicit Drug use-    REVIEW OF SYSTEMS:  Constitutional: [ ] fever, [ ]weight loss,  [ ]fatigue  Eyes: [ ] visual changes  Respiratory: [ ]shortness of breath;  [ ] cough, [ ]wheezing, [ ]chills, [ ]hemoptysis  Cardiovascular: [ ] chest pain, [ ]palpitations, [ ]dizziness,  [ ]leg swelling [ ]syncope  Gastrointestinal: [ ] abdominal pain, [ ]nausea, [ ]vomiting,  [ ]diarrhea   Genitourinary: [ ] dysuria, [ ] hematuria  Neurologic: [ ] headaches [ ] tremors  [ ] weakness [ ] lightheadedness  Skin: [ ] itching, [ ]burning, [ ] rashes  Endocrine: [ ] heat or cold intolerance  Musculoskeletal: [ ] joint pain or swelling; [ ] muscle, back, or extremity pain  Psychiatric: [ ] depression, [ ]anxiety, [ ]mood swings, or [ ]difficulty sleeping  Hematologic: [ ] easy bruising, [ ] bleeding gums     [ x] All others negative	  [ ] Unable to obtain    Vital Signs Last 24 Hrs  T(C): 36.8 (22 Oct 2024 08:14), Max: 36.9 (22 Oct 2024 04:49)  T(F): 98.2 (22 Oct 2024 08:14), Max: 98.4 (22 Oct 2024 04:49)  HR: 94 (22 Oct 2024 08:14) (78 - 120)  BP: 111/79 (22 Oct 2024 08:14) (111/79 - 126/86)  BP(mean): --  RR: 18 (22 Oct 2024 08:14) (18 - 19)  SpO2: 95% (22 Oct 2024 08:14) (95% - 98%)    Parameters below as of 22 Oct 2024 08:14  Patient On (Oxygen Delivery Method): room air      I&O's Summary    21 Oct 2024 07:01  -  22 Oct 2024 07:00  --------------------------------------------------------  IN: 120 mL / OUT: 1000 mL / NET: -880 mL    22 Oct 2024 07:01  -  22 Oct 2024 14:59  --------------------------------------------------------  IN: 250 mL / OUT: 0 mL / NET: 250 mL        PHYSICAL EXAM:  General: No acute distress  HEENT: EOMI  Neck:  No JVD  Lungs: Clear to auscultation bilaterally; No rales or wheezing  Heart: Regular rate and rhythm; No murmurs, rubs, or gallops  Abdomen: soft, non tender, non distended   Extremities: warm, no edema   Nervous system:  Alert & Oriented X3  Psychiatric: Normal affect  Skin: No rashes or lesions    LABS:  10-22    139  |  108  |  17  ----------------------------<  109[H]  4.0   |  26  |  0.94    Ca    9.3      22 Oct 2024 06:47  Phos  3.6     10-22  Mg     2.3     10-22    TPro  7.1  /  Alb  3.4[L]  /  TBili  0.6  /  DBili  x   /  AST  21  /  ALT  42  /  AlkPhos  68  10-21    Creatinine Trend: 0.94<--, 0.97<--, 1.00<--, 0.93<--, 0.86<--, 0.93<--                        16.6   7.23  )-----------( 263      ( 22 Oct 2024 06:47 )             47.9         Lipid Panel:   Cardiac Enzymes:           RADIOLOGY:    ECG [my interpretation]:    TELEMETRY:    ECHO:    STRESS TEST:    CATHETERIZATION: CHIEF COMPLAINT: right upper and lower extremities numbness    Pt. Danish speaking. HPI and ROS taken with assistance of daughter Samuel at bedside    HPI:  58-year-old male, AAOx3, walks independently with past medical history significant for asthma and GERD  presenting with Right-sided numbness.  Patient states that he noticed he had numbness from the right elbow to his right hand, as well as  to his right lower extremity, from the right knee to the right foot.  Patient states that numbness has progressed to involve his entire right side of his body. Patient also reported he had left sided facial droop and swelling of his left eye.  He does endorse mild headache as well as nausea. Patient denies chest pain, palpitations, syncope, shortness of breath, LE edema, PND/orthopnea.          PAST MEDICAL & SURGICAL HISTORY:  Asthma      GERD (gastroesophageal reflux disease)      Hernia          Allergies    penicillin (Hives; Rash)    Intolerances        MEDICATIONS  (STANDING):  aspirin  chewable 324 milliGRAM(s) Oral daily  atorvastatin 80 milliGRAM(s) Oral at bedtime  enoxaparin Injectable 40 milliGRAM(s) SubCutaneous every 24 hours  pantoprazole    Tablet 40 milliGRAM(s) Oral before breakfast  polyethylene glycol 3350 17 Gram(s) Oral daily    MEDICATIONS  (PRN):  acetaminophen     Tablet .. 650 milliGRAM(s) Oral every 6 hours PRN Temp greater or equal to 38C (100.4F), Mild Pain (1 - 3), Moderate Pain (4 - 6)      FAMILY HISTORY:  Family history of irritable colon (Mother)        ***No family history of premature coronary artery disease or sudden cardiac death    SOCIAL HISTORY:  Smoking-denies  Alcohol-drinks beer and hard liqueur on weekends  Illicit Drug use-denies    REVIEW OF SYSTEMS:  Constitutional: [ ] fever, [ ]weight loss,  [ ]fatigue  Eyes: [ ] visual changes  Respiratory: [ ]shortness of breath;  [ ] cough, [ ]wheezing, [ ]chills, [ ]hemoptysis  Cardiovascular: [ ] chest pain, [ ]palpitations, [ ]dizziness,  [ ]leg swelling [ ]syncope  Gastrointestinal: [ ] abdominal pain, [ ]nausea, [ ]vomiting,  [ ]diarrhea   Genitourinary: [ ] dysuria, [ ] hematuria  Neurologic: [ x] headaches [ ] tremors  [ x] weakness-right sided weakness [ ] lightheadedness  Skin: [ ] itching, [ ]burning, [ ] rashes  Endocrine: [ ] heat or cold intolerance  Musculoskeletal: [ ] joint pain or swelling; [ ] muscle, back, or extremity pain  Psychiatric: [ ] depression, [ ]anxiety, [ ]mood swings, or [ ]difficulty sleeping  Hematologic: [ ] easy bruising, [ ] bleeding gums     [ x] All others negative	  [ ] Unable to obtain    Vital Signs Last 24 Hrs  T(C): 36.8 (22 Oct 2024 08:14), Max: 36.9 (22 Oct 2024 04:49)  T(F): 98.2 (22 Oct 2024 08:14), Max: 98.4 (22 Oct 2024 04:49)  HR: 94 (22 Oct 2024 08:14) (78 - 120)  BP: 111/79 (22 Oct 2024 08:14) (111/79 - 126/86)  BP(mean): --  RR: 18 (22 Oct 2024 08:14) (18 - 19)  SpO2: 95% (22 Oct 2024 08:14) (95% - 98%)    Parameters below as of 22 Oct 2024 08:14  Patient On (Oxygen Delivery Method): room air      I&O's Summary    21 Oct 2024 07:01  -  22 Oct 2024 07:00  --------------------------------------------------------  IN: 120 mL / OUT: 1000 mL / NET: -880 mL    22 Oct 2024 07:01  -  22 Oct 2024 14:59  --------------------------------------------------------  IN: 250 mL / OUT: 0 mL / NET: 250 mL        PHYSICAL EXAM:  General: No acute distress  HEENT: EOMI  Neck:  No JVD  Lungs: Clear to auscultation bilaterally; No rales or wheezing  Heart: Regular rate and rhythm; No murmurs, rubs, or gallops  Abdomen: soft, non tender, non distended   Extremities: warm, no edema   Nervous system:  Alert & Oriented X3  Psychiatric: Normal affect  Skin: No rashes or lesions    LABS:  10-22    139  |  108  |  17  ----------------------------<  109[H]  4.0   |  26  |  0.94    Ca    9.3      22 Oct 2024 06:47  Phos  3.6     10-22  Mg     2.3     10-22    TPro  7.1  /  Alb  3.4[L]  /  TBili  0.6  /  DBili  x   /  AST  21  /  ALT  42  /  AlkPhos  68  10-21    Creatinine Trend: 0.94<--, 0.97<--, 1.00<--, 0.93<--, 0.86<--, 0.93<--                        16.6   7.23  )-----------( 263      ( 22 Oct 2024 06:47 )             47.9         Lipid Panel:   Cardiac Enzymes:           RADIOLOGY:< from: MR Head w/ IV Cont (10.20.24 @ 11:44) >    IMPRESSION: ACUTE LEFT MEDULLARY INFARCT. NO ACUTE INTRACRANIAL   HEMORRHAGE.    --- End of Report ---    < end of copied text >  < from:  Physiol Extremity Lower 3+ Level, BI (10.18.24 @ 21:15) >  Findings/  Impression:  Right lower extremity: The ankle brachial index is 1.31. TBI is 0.95 with   digital pressures of 125 mmHg. The pulse waveforms are normal. No   pressure gradient.    Left lower extremity: The ankle brachial index is 1.30. TBI is 0.92 with   digital pressures of 121 mmHg. The pulse waveforms are normal. No   pressure gradient.    --- End of Report ---    < end of copied text >  < from: CT Angio Neck Stroke Protocol w/ IV Cont (10.18.24 @ 13:02) >  IMPRESSION:    1. BRAIN:  No intracranial hemorrhage is appreciated.  No intracranial   mass lesion is appreciated.    2.  RIGHT CAROTID SYSTEM:    No hemodynamically significant stenosis.    3.   LEFT CAROTID SYSTEM:     No hemodynamically significant stenosis.    4.   VERTEBRAL CIRCULATION:    Patent.    5.  ANTERIOR INTRACRANIAL CIRCULATION:     Intracranial atherosclerosis   cavernous and clinoid segments of the internal carotid arteries, mild.    6.  POSTERIOR INTRACRANIAL CIRCULATION:    Intracranial atherosclerosis   left vertebral artery, mild to moderate.    7.  No large vessel occlusion    8.  BRAIN PERFUSION:   No acute infarction of the brain is convincingly   demonstrated.      CRITICAL VALUE:    I discussed this case with the treating emergency   department physician at  10/18/2024 1:35 PM.  Hospital policies for   critical values including read back policy were followed.  The verbal   communication of the critical value supplements this written report.    --- End of Report ---            ZURI ESTRELLA MD; Attending Radiologist  This document has been electronically signed. Oct 18 2024  1:48PM    < end of copied text >  < from: CT Head No Cont (10.17.24 @ 14:04) >    IMPRESSION:  1.  No acute intracranial hemorrhage, mass effect, or midline shift.  2.  Chronic small vessel disease. If there is concern for acute   neurologic compromise, recommend MRI of the brain for further evaluation.        --- End of Report ---          < end of copied text >      ECG [my interpretation]: 10/18/2024 13:10:07    TELEMETRY: NSR, Sinus tachy HR range 65-140s    ECHO: < from: TTE W or WO Ultrasound Enhancing Agent (10.21.24 @ 09:23) >  CONCLUSIONS:      1. Left ventricular cavity is small. Left ventricular wall thickness is mildly increased. Left ventricularsystolic function is hyperdynamic with an ejection fraction of 74 % by Otero's method of disks.   2. Near complete mid cavity obliteration without significant gradient.   3. There is mild (grade 1) left ventricular diastolic dysfunction.   4. Normal right ventricular cavity size and normal right ventricular systolic function.   5. Trileaflet aortic valve. Fibrocalcific aortic valve sclerosis without stenosis.   6. Structurally normal mitral valve with normal leaflet excursion.   7. No mitral regurgitation.   8. Agitated saline injection reveals bubbles in the left heart, consistent with a patent foramen ovale.   9. No pericardial effusion seen.  10. No prior echocardiogram is available for comparison.    ________________________________________________________________________________________  FINDINGS:     Left Ventricle:  A false tendon (normal anatomic variant) is noted in the left ventricular cavity. The left ventricular cavity is small. Left ventricular wall thickness is mildly increased. Left ventricular systolic function is hyperdynamic with a calculated ejection fraction of 74 % by the Otero's biplane method of disks. There are no regional wall motion abnormalities seen. Near complete mid cavity obliteration without significant gradient. There is mild (grade 1) left ventricular diastolic dysfunction.     Right Ventricle:  The right ventricular cavity is normal in size and right ventricular systolic function is normal. Tricuspid annular plane systolic excursion (TAPSE) is 2.0 cm (normal >=1.7 cm).     Left Atrium:  The left atrium is normal in size with an indexed volume of 10.78 ml/m².     Right Atrium:  The right atrium is normal in size.     Interatrial Septum:  Agitated saline injection reveals bubbles in the left heart, consistent with a patent foramen ovale.     Aortic Valve:  The aortic valve appears trileaflet. There is fibrocalcific aortic valve sclerosis without stenosis. There is no evidence of aortic regurgitation.     Mitral Valve:  Structurally normal mitral valve with normal leaflet excursion. There is no evidence of mitral regurgitation.     Tricuspid Valve:  Structurally normal tricuspid valve with normal leaflet excursion. There is trace tricuspid regurgitation. There is no echocardiographicevidence of pulmonary hypertension. Estimated pulmonary artery systolic pressure is 24 mmHg.     Pulmonic Valve:  Structurally normal pulmonic valve with normal leaflet excursion. There is trace pulmonic regurgitation.     Aorta:  The aortic root andascending aorta appear normal in size.     Pericardium:  No pericardial effusion seen.     Systemic Veins:  The inferior vena cava is normal in size measuring 0.90 cm in diameter, (normal <2.1cm) with normal inspiratory collapse (normal >50%) consistent with normal right atrial pressure (~3, range 0-5mmHg).  ____________________________________________________________________    < end of copied text >    < from: CARLOS ALBERTO W or WO Ultrasound Enhancing Agent (10.22.24 @ 10:58) >     CONCLUSIONS:      1. No cardiac source of embolism was identified.   2. No evidence of left atrial or left atrial appendage thrombus.   3. Left ventricular systolic function is normal with an ejection fraction visually estimated at 55 to 60 %.   4. Trace aortic regurgitation.   5. Agitated saline injection reveals bubbles in the left heart, consistent with a patent foramen ovale.   6. Interatrial septum is aneurysmal.   7. No pericardial effusion seen.   8. Compared to the transthoracic echocardiogram performed on 10/21/2024, a CARLOS ALBERTO was performed and additional information provided.    ____________________________________________________________________  CARLOS ALBERTO Procedure:  Local oropharyngeal anesthetic was provided with viscous lidocaine. Study was performed with anesthesia (please see anesthesia record).  Intravenous conscious sedation was provided with 200 mg Propofol. The CARLOS ALBERTO probe was passed without difficulty. The patient tolerated the procedure well and without complications.     ________________________________________________________________________________________  FINDINGS:     Left Ventricle:  Left ventricular systolic function is normal with an ejection fraction visually estimated at 55 to 60%.     Right Ventricle:  The right ventricular cavity is normal in size and right ventricular systolic function is normal.     Left Atrium:  There is no evidence of left atrial or left atrial appendage thrombus. The left atrial appendage emptying velocity is normal at 46 cm/s (normal >40cm/s).     Right Atrium:  There is no evidence of a right atrial thrombus.     Interatrial Septum:  Interatrial septum is aneurysmal. Agitated saline injection reveals bubbles in the left heart, consistent with a patent foramen ovale.     Aortic Valve:  The aortic valve appears trileaflet with normal systolic excursion. There is no aortic valve stenosis. There is trace aortic regurgitation.     Mitral Valve:  Structurally normal mitral valve with normal leafletexcursion. There is no evidence of mitral regurgitation.     Tricuspid Valve:  The tricuspid valve was not well visualized. There is no evidence of tricuspid regurgitation.     Pulmonic Valve:  The pulmonic valve was not well visualized.     Aorta:  The aortic root at the sinuses of Valsalva is normal in size, measuring 3.47 cm (indexed 2.08 cm/m²). There is no atheroma in the visualized portions of the transverse aortic arch. There is no atheroma in the visualized portions of the descending aorta.     Pericardium:  No pericardial effusion seen.  ____________________________________________________________________  QUANTITATIVE DATA:  Left Ventricle Measurements: (Indexed to BSA)     Visualized LV EF%: 55 to 60%    Aorta Measurements: (Normalrange) (Indexed to BSA)     Ao Root d 3.47 cm (3.1 - 3.7 cm) 2.08 cm/m²             ________________________________________________________________________________________    < end of copied text >

## 2024-10-22 NOTE — SWALLOW BEDSIDE ASSESSMENT ADULT - DIET PRIOR TO ADMI
Thelma Booker is an 33 year old female.  Patient presents for Pre Op Consultation.  Procedure: laparoscopic cholecystectomy   Date: 17  Surgeon: Dr JESSICA Costello   Facility: [facility]      CHOLELITHIASIS  Patient has chronic moderate  cholelithiasis without choledocolithiasis, with  RUQ abdominal pain, with radiation to the mid back, without radiation to right shoulder, without fever, with  fatty food exacerbation,  for over one year, treated with fasting, low fat diet, position change, NSAID's,  PPI, with  no  resolution of abdominal discomfort and nausea without complications of anorexia or emesis or weight loss.     GASTROESOPHAGEAL REFLUX DISEASE  The patient has chronic continuous  moderate  gastro-esophageal reflux disease without Hernandez's esophagitis for over one year , treated with  no medication, once daily  proton pump inhibitor, with complete relief of pyrosis, hoarseness , without hematemesis, melena present. Patient has  had an EGD .            Past Medical History:   Diagnosis Date   • Calculus of gallbladder without cholecystitis 2017   • Eczema    • JUV OSTEOCHONDROSIS ARM 2002     Past Surgical History:   Procedure Laterality Date   • WRIST SURGERY       Family History   Problem Relation Age of Onset   • Stroke Father    • High blood pressure Father    • Lipids Father    • High blood pressure Mother    • High cholesterol Mother    • Diabetes Paternal Grandfather    • Breast cancer Maternal Grandmother 86           Social History   Substance Use Topics   • Smoking status: Never Smoker   • Smokeless tobacco: Never Used   • Alcohol use No       Prior to Admission Meds:  (Not in a hospital admission)  Scheduled Meds:  Continuous Infusions:  PRN Meds:    Allergies: ALLERGIES:  No Known Allergies    Active Problems:    * No active hospital problems. *    Blood pressure 127/78, pulse 97, temperature 99.5 °F (37.5 °C), temperature source Tympanic,  height 5' (1.524 m), weight 68.5 kg, SpO2 100 %.    Review of Systems   Constitutional: Negative for diaphoresis and fever.   HENT: Negative for sore throat.    Eyes: Negative for blurred vision and double vision.   Respiratory: Negative for cough and shortness of breath.    Cardiovascular: Negative for chest pain and palpitations.   Gastrointestinal: Positive for abdominal pain and heartburn. Negative for vomiting.   Genitourinary: Negative for dysuria and frequency.        Her  last normal menstrual period (LNMP) 17, [id number], and she is on oral contraceptive medication.   Musculoskeletal: Negative for back pain and joint pain.   Skin: Negative for itching and rash.   Neurological: Negative for dizziness, seizures and headaches.   Endo/Heme/Allergies: Negative for polydipsia. Does not bruise/bleed easily.   Psychiatric/Behavioral: Negative for depression. The patient is not nervous/anxious.        Physical Exam   Constitutional: She is oriented to person, place, and time. She appears well-developed and well-nourished. No distress.   HENT:   Head: Normocephalic.   Right Ear: External ear normal.   Left Ear: External ear normal.   Nose: Nose normal.   Mouth/Throat: Oropharynx is clear and moist. No oropharyngeal exudate.   Eyes: Conjunctivae and EOM are normal. Pupils are equal, round, and reactive to light. Right eye exhibits no discharge. Left eye exhibits no discharge. No scleral icterus.   Exam of bilateral fundi shows no abnormal macular or papular findings.     Neck: Normal range of motion. Neck supple. Normal carotid pulses and no JVD present. Carotid bruit is not present. No thyromegaly present.   Cardiovascular: Normal rate, regular rhythm, normal heart sounds and intact distal pulses.  Exam reveals no gallop and no friction rub.    No murmur heard.  Pulmonary/Chest: Effort normal and breath sounds normal. No respiratory distress. She has no wheezes. She has no rales.   Abdominal: Soft. Bowel sounds are  normal. She exhibits no distension and no mass. There is no hepatosplenomegaly. There is tenderness in the right upper quadrant. There is no rebound and no guarding. Hernia confirmed negative in the right inguinal area and confirmed negative in the left inguinal area.   There is no bilateral costovertebral angle tenderness present. There is present right upper quadrant minimal  tenderness.      Genitourinary: No breast swelling, tenderness, discharge or bleeding.   Genitourinary Comments: Pelvic exam declined by patient.   Musculoskeletal: She exhibits no edema or tenderness.   Lymphadenopathy:     She has no cervical adenopathy.        Right: No inguinal adenopathy present.        Left: No inguinal adenopathy present.   Neurological: She is alert and oriented to person, place, and time. She displays normal reflexes. No cranial nerve deficit or sensory deficit. She exhibits normal muscle tone. Gait abnormal. Coordination normal.   Skin: No rash noted. No erythema.   Psychiatric: She has a normal mood and affect. Her behavior is normal. Thought content normal.       Assessment:  1. Pre-op exam  Patient medically stable for the above pending surgery.  - Prothrombin Time; Future  - Pregnancy Test Qualitative Serum; Future  -per patient instructions    2. Calculus of gallbladder without cholecystitis without obstruction  Active, Symptomatic   Patient medically stable for the above pending surgery.  -per patient instructions    3. Gastroesophageal reflux disease without esophagitis  Controlled   -continue current prescription medication use  -per patient instructions      Plan:  Outpatient Encounter Prescriptions as of 5/30/2017   Medication Sig Dispense Refill   • Ferrous Gluconate 325 (36 Fe) MG Tab Take 1 tablet by mouth 2 times daily. 60 tablet 1   • lansoprazole (PREVACID) 30 MG capsule Take 1 capsule by mouth 2 times daily. 180 capsule 0   • norethindrone-ethinyl estradiol (NECON 1/35, 28,) 1-35 MG-MCG per tablet  Take 1 tablet by mouth daily. 28 packet 2   • mometasone (ELOCON) 0.1 % cream Apply 1 application topically daily. 45 g 0   • Multiple Vitamins-Minerals (MULTIVITAMIN PO) Take 1 tablet by mouth daily.     • triamcinolone (ARISTOCORT) 0.1 % cream Apply 1 application topically as needed.     • albuterol 108 (90 BASE) MCG/ACT inhaler Inhale 2 puffs into the lungs every 4 hours as needed for Shortness of Breath, Wheezing or Other (cough). 1 Inhaler 0   • meloxicam (MOBIC) 15 MG tablet Take 1 tablet by mouth daily as needed for Pain. Take with food, stop if causes stomach upset 30 tablet 1     No facility-administered encounter medications on file as of 5/30/2017.        Dr Lo gave patient the AVS (after visit summary), discussed content and follow up plans, and informed patient to continue medications as advised.  PREGNANCY TEST QUALITATIVE SERUM   Status:  Final result   Visible to patient:  No (Not Released)   Dx:  Pre-op exam Order: [id number]          Ref Range & Units 10:36 AM       HCG QUALITATIVE NEGATIVE NEGATIVE     Resulting Agency  [facility]          Specimen Collected: 05/30/17 10:36 AM     Last Resulted: 05/30/17 12:35 PM                /11/2017 12:16 PM - [facility]   Component Results   Component Value Ref Range & Units Status Collected Lab   COLOR YELLOW YELLOW Final 05/11/2017 11:35 AM [facility]   APPEARANCE CLEAR  Final 05/11/2017 11:35 AM [facility]   GLUCOSE(URINE) NEGATIVE NEGATIVE mg/dL Final 05/11/2017 11:35 AM [facility]   BILIRUBIN NEGATIVE NEGATIVE Final 05/11/2017 11:35 AM [facility]   KETONES TRACE (A) NEGATIVE mg/dL Final 05/11/2017 11:35 AM [facility]   SPECIFIC GRAVITY 1.010 1.005 - 1.030 Final 05/11/2017 11:35 AM [facility]   BLOOD NEGATIVE NEGATIVE Final 05/11/2017 11:35 AM [facility]   pH 6.5 5.0 - 7.0 Units Final 05/11/2017 11:35 AM [facility]   PROTEIN(URINE) NEGATIVE NEGATIVE mg/dL Final 05/11/2017 11:35 AM [facility]   UROBILINOGEN 0.2 0.0 - 1.0 mg/dL Final 05/11/2017 11:35 AM [facility]   NITRITE NEGATIVE NEGATIVE Final 05/11/2017  11:35 AM [facility]   LEUKOCYTE ESTERASE NEGATIVE NEGATIVE Final 05/11/2017 11:35 AM [facility]   SPECIMEN TYPE   Final 05/11/2017 11:35 AM [facility]   URINE, CLEAN CATCH/MIDSTREAM       WBC   Date Value Ref Range Status   05/11/2017 10.7 4.2 - 11.0 K/mcL Final     RBC   Date Value Ref Range Status   05/11/2017 4.70 4.00 - 5.20 mil/mcL Final     HGB   Date Value Ref Range Status   05/11/2017 9.9 (L) 12.0 - 15.5 g/dL Final     HCT   Date Value Ref Range Status   05/11/2017 31.8 (L) 36.0 - 46.5 % Final     MCV   Date Value Ref Range Status   05/11/2017 67.7 (L) 78.0 - 100.0 fl Final     MCH   Date Value Ref Range Status   05/11/2017 21.1 (L) 26.0 - 34.0 pg Final     MCHC   Date Value Ref Range Status   05/11/2017 31.1 (L) 32.0 - 36.5 g/dL Final     RDW-CV   Date Value Ref Range Status   05/11/2017 18.7 (H) 11.0 - 15.0 % Final     PLT   Date Value Ref Range Status   05/11/2017 408 140 - 450 K/mcL Final   12/17/2012 297 140 - 450 K/mcL      Neutrophil   Date Value Ref Range Status   05/11/2017 63 % Final     LYMPH   Date Value Ref Range Status   05/11/2017 28 % Final     MONO   Date Value Ref Range Status   05/11/2017 7 % Final     EOSIN   Date Value Ref Range Status   05/11/2017 2 % Final     BASO   Date Value Ref Range Status   05/11/2017 0 % Final     DIFF TYPE   Date Value Ref Range Status   05/11/2017 AUTOMATED DIFFERENTIAL  Final     Absolute Neutrophil   Date Value Ref Range Status   05/11/2017 6.8 1.8 - 7.7 K/mcL Final     Absolute Lymph   Date Value Ref Range Status   05/11/2017 3.0 1.0 - 4.8 K/mcL Final     Absolute Mono   Date Value Ref Range Status   05/11/2017 0.7 0.3 - 0.9 K/mcL Final     Absolute Eos   Date Value Ref Range Status   05/11/2017 0.2 0.1 - 0.5 K/mcL Final     Absolute Baso   Date Value Ref Range Status   05/11/2017 0.0 0.0 - 0.3 K/mcL Final         Glucose   Date/Time Value Ref Range Status   05/11/2017 10:15 AM 87 65 - 99 mg/dL Final     Sodium   Date/Time Value Ref Range Status   05/11/2017 10:15  (L)  135 - 145 mmol/L Final     Potassium   Date/Time Value Ref Range Status   05/11/2017 10:15 AM 3.7 3.4 - 5.1 mmol/L Final     Chloride   Date/Time Value Ref Range Status   05/11/2017 10:15  98 - 107 mmol/L Final     Carbon Dioxide   Date/Time Value Ref Range Status   05/11/2017 10:15 AM 23 21 - 32 mmol/L Final     BUN   Date/Time Value Ref Range Status   05/11/2017 10:15 AM 7 6 - 20 mg/dL Final     Creatinine   Date/Time Value Ref Range Status   05/11/2017 10:15 AM 0.77 0.51 - 0.95 mg/dL Final     TOTAL PROTEIN   Date/Time Value Ref Range Status   05/11/2017 10:15 AM 8.2 6.4 - 8.2 g/dL Final     Albumin   Date/Time Value Ref Range Status   05/11/2017 10:15 AM 3.5 (L) 3.6 - 5.1 g/dL Final     CALCIUM   Date/Time Value Ref Range Status   05/11/2017 10:15 AM 8.7 8.4 - 10.2 mg/dL Final     TOTAL BILIRUBIN   Date/Time Value Ref Range Status   05/11/2017 10:15 AM 0.3 0.2 - 1.0 mg/dL Final     AST/SGOT   Date/Time Value Ref Range Status   05/11/2017 10:15 AM 19 <38 Units/L Final     ALK PHOSPHATASE   Date/Time Value Ref Range Status   05/11/2017 10:15 AM 83 45 - 117 Units/L Final     ALT/SGPT   Date/Time Value Ref Range Status   05/11/2017 10:15 AM 28 <79 Units/L Final     Anion Gap   Date/Time Value Ref Range Status   05/11/2017 10:15 AM 13 10 - 20 mmol/L Final     BUN/Creatinine Ratio   Date/Time Value Ref Range Status   05/11/2017 10:15 AM 9 7 - 25 Final     GLOBULIN   Date/Time Value Ref Range Status   05/11/2017 10:15 AM 4.7 (H) 2.0 - 4.0 g/dL Final     A/G Ratio, Serum   Date/Time Value Ref Range Status   05/11/2017 10:15 AM 0.7 (L) 1.0 - 2.4 Final     GFR Estimate, Non    Date/Time Value Ref Range Status   05/11/2017 10:15 AM >90  Final     Comment:     eGFR results = or >90 mL/min/1.73m2 = Normal kidney function.     GFR Estimate,    Date/Time Value Ref Range Status   05/11/2017 10:15 AM >90  Final     Comment:     eGFR results = or >90 mL/min/1.73m2 = Normal kidney function.          No results found for: HGBA1C    CHOLESTEROL   Date Value Ref Range Status   03/06/2015 224 (H) 100 - 200 mg/dL Final     Comment:        Desirable                <200  Borderline High          200 to 239  High                     >=240          TRIGLYCERIDE   Date Value Ref Range Status   03/06/2015 247 (H) <150 mg/dL Final     Comment:     Normal                   <150  Borderline High          150 to 199  High                     200 to 499  Very High                >=500       HDL   Date Value Ref Range Status   03/06/2015 51 >39 mg/dL Final     Comment:     Interpretive Data: Low <40, Borderline Low 40 to 49. Near Optimal 50 to 59, Optimal >=60     CHOL/HDL   Date Value Ref Range Status   03/06/2015 4.4 <4.5 Final     CALCULATED LDL   Date Value Ref Range Status   03/06/2015 124 <130 mg/dL Final     Comment:        Optimal                  <100  Near Optimal             100 to 129  Borderline High          130 to 159  High                     160 to 189  Very High                >=190          CALCULATED NON HDL   Date Value Ref Range Status   03/06/2015 173 mg/dL Final     Comment:        Therapeutic Target:  CHD and risk equivalents <130  Multiple risk factors    <160  0 to 1 risk factors      <190              PROTIME   Date Value Ref Range Status   05/30/2017 11.0 9.7 - 11.8 sec Final     INR   Date Value Ref Range Status   05/30/2017 1.1  Final     Comment:     INR Therapeutic Range: 2.0 to 3.0 (2.5 to 3.5 recommended for recurrent thrombotic episodes and mechanical prosthetic heart valves.)          No results found for: TSH      No results found for: VITD25      Patient medically stable for the above pending surgery.    The contents of this pre op consultation and studies results are transmitted to Dr JESSICA Costello  at this time.      Patient Instructions   Return if symptoms worsen or fail to improve.    Please follow up for abdominal pain symptoms that persist or worsen as discussed and call 911 or  go to ER for any severe symptoms.    Patient avised to call clinic to discuss any questions regarding test results 48 hours after the tests are reported unless otherwise requested or call if no results received in 2 weeks of them being completed.     Continue current meds per Epic as advised.    Please see After Visit Summary for other details of the remaining discussion.    Avoid aspirin, Aleve, Advil and ibuprofen 5 days pre op.                  Jose Luis Lo MD  5/30/2017   as per Pt, soft easy-to-chew foods; thin liquids

## 2024-10-22 NOTE — CONSULT NOTE ADULT - PROBLEM SELECTOR RECOMMENDATION 9
-not confirmed if cardioembolic  -Keep pt NPO after midnight for possible CARLOS ALBERTO tomorrow  -if CARLOS ALBERTO negative, EP eval for ILR placement  - secondary stroke prevention  -Will need outpatient  PFO work up upon discharge
-There is no telemetry evidence of atrial arrhythmias; nor is there a clear pacing indication at this time.  Per [facility], patient will benefit from prolonged monitoring for detection of AF/PAF as cause of potential cardioembolic source.  ILR implantation for prolonged monitoring for detection of suspected, asymptomatic AF/PAF advised. Risks, benefits, and alternatives discussed with patient and daughter at bedside.  -patient agreeable  -ILR placement scheduled for Thursdays at bedside

## 2024-10-22 NOTE — SWALLOW BEDSIDE ASSESSMENT ADULT - SWALLOW EVAL: DIAGNOSIS
Pt p/w adequate oral & pharyngeal phases of chew & swallow for tolerating easy to chew diet: Good labial seal, Functional bolus manipulation & propulsion, Unremarkable oral phase; Timely and efficient oropharyngeal swallow with no overt s/s of laryngeal penetration or aspiration at this exam.

## 2024-10-22 NOTE — SWALLOW BEDSIDE ASSESSMENT ADULT - COMMENTS
Pt is AA+Ox3, HOB elevated to 90°. Exam given in Dutch by SLP. Pt offered no c/o pain, but attests to having GERD (takes Dexilant at home).

## 2024-10-22 NOTE — PROGRESS NOTE ADULT - SUBJECTIVE AND OBJECTIVE BOX
PGY-1 Progress Note discussed with attending      PLEASE CONTACT ON CALL TEAM:  - On Call Team (Please refer to Christiano) FROM 5:00 PM - 8:30PM  - Nightfloat Team FROM 8:30 -7:30 AM    INTERVAL HPI/OVERNIGHT EVENTS:     No acute overnight events. [facility]. Pt has no new complaints. Ongoing weakness of R leg and arm.    MEDICATIONS  (STANDING):  aspirin  chewable 324 milliGRAM(s) Oral daily  atorvastatin 80 milliGRAM(s) Oral at bedtime  enoxaparin Injectable 40 milliGRAM(s) SubCutaneous every 24 hours  pantoprazole    Tablet 40 milliGRAM(s) Oral before breakfast  polyethylene glycol 3350 17 Gram(s) Oral daily    MEDICATIONS  (PRN):  acetaminophen     Tablet .. 650 milliGRAM(s) Oral every 6 hours PRN Temp greater or equal to 38C (100.4F), Mild Pain (1 - 3), Moderate Pain (4 - 6)      Vital Signs Last 24 Hrs  T(C): 36.8 (22 Oct 2024 08:14), Max: 36.9 (22 Oct 2024 04:49)  T(F): 98.2 (22 Oct 2024 08:14), Max: 98.4 (22 Oct 2024 04:49)  HR: 94 (22 Oct 2024 08:14) (78 - 120)  BP: 111/79 (22 Oct 2024 08:14) (111/79 - 126/86)  BP(mean): --  RR: 18 (22 Oct 2024 08:14) (18 - 19)  SpO2: 95% (22 Oct 2024 08:14) (95% - 98%)    Parameters below as of 22 Oct 2024 08:14  Patient On (Oxygen Delivery Method): room air        PHYSICAL EXAMINATION:  GENERAL: NAD  HEAD:  Atraumatic, Normocephalic  EYES:  conjunctiva and sclera clear  NECK: Supple  CHEST/LUNG: Clear to auscultation  HEART: Regular rate and rhythm; No murmurs, rubs, or gallops  ABDOMEN: Soft, Nontender, Bowel sounds present, no masses on palpation  NERVOUS SYSTEM:  Alert and oriented. Weakness of right leg and arm  EXTREMITIES:  No BLE edema  SKIN: warm, dry                          16.6   7.23  )-----------( 263      ( 22 Oct 2024 06:47 )             47.9     10-22    139  |  108  |  17  ----------------------------<  109[H]  4.0   |  26  |  0.94    Ca    9.3      22 Oct 2024 06:47  Phos  3.6     10-22  Mg     2.3     10-22    TPro  7.1  /  Alb  3.4[L]  /  TBili  0.6  /  DBili  x   /  AST  21  /  ALT  42  /  AlkPhos  68  10-21    LIVER FUNCTIONS - ( 21 Oct 2024 05:36 )  Alb: 3.4 g/dL / Pro: 7.1 g/dL / ALK PHOS: 68 U/L / ALT: 42 U/L DA / AST: 21 U/L / GGT: x                   I&O's Summary    21 Oct 2024 07:01  -  22 Oct 2024 07:00  --------------------------------------------------------  IN: 120 mL / OUT: 1000 mL / NET: -880 mL    22 Oct 2024 07:01  -  22 Oct 2024 13:03  --------------------------------------------------------  IN: 250 mL / OUT: 0 mL / NET: 250 mL

## 2024-10-22 NOTE — CONSULT NOTE ADULT - ASSESSMENT
58-year-old male, AAOx3, walks independently with past medical history significant for asthma and GERD  presenting with Right-sided numbness.  Patient noted with +PFO on echo. Cardiology was consulted  
 58-year-old male, AAOx3, walks independently with past medical history significant for asthma and GERD  presenting with Right-sided numbness.  Patient noted with acute L medullary CVA and +PFO. Electrophysiology was consulted for ILR placement

## 2024-10-22 NOTE — PROGRESS NOTE ADULT - SUBJECTIVE AND OBJECTIVE BOX
PRESENTING CC:    OVERNIGHT EVENTS:    SUBJECTIVE:         ------------------------  PMH:   PAST MEDICAL & SURGICAL HISTORY:  Asthma    GERD (gastroesophageal reflux disease)    Hernia        Allergies    penicillin (Hives; Rash)    Intolerances        MEDICATIONS  (STANDING):  aspirin  chewable 324 milliGRAM(s) Oral daily  atorvastatin 80 milliGRAM(s) Oral at bedtime  enoxaparin Injectable 40 milliGRAM(s) SubCutaneous every 24 hours  pantoprazole    Tablet 40 milliGRAM(s) Oral before breakfast  polyethylene glycol 3350 17 Gram(s) Oral daily    MEDICATIONS  (PRN):  acetaminophen     Tablet .. 650 milliGRAM(s) Oral every 6 hours PRN Temp greater or equal to 38C (100.4F), Mild Pain (1 - 3), Moderate Pain (4 - 6)      FAMILY HISTORY:  Family history of irritable colon (Mother)      Reviewed; no change from my prior note    SOCIAL HISTORY:  Reviewed, no change from my prior note    REVIEW OF SYSTEMS:  Constitutional: [ ] fever, [ ]weight loss,  [ ]fatigue  Eyes: [ ] visual changes  Respiratory: [ ]shortness of breath;  [ ] cough, [ ]wheezing, [ ]chills, [ ]hemoptysis  Cardiovascular: [ ] chest pain, [ ]palpitations, [ ]dizziness,  [ ]leg swelling [ ]syncope  Gastrointestinal: [ ] abdominal pain, [ ]nausea, [ ]vomiting,  [ ]diarrhea   Genitourinary: [ ] dysuria, [ ] hematuria  Neurologic: [ ] headaches [ ] tremors [ ] weakness [ ] lightheadedness  Skin: [ ] itching, [ ]burning, [ ] rashes  Endocrine: [ ] heat or cold intolerance  Musculoskeletal: [ ] joint pain or swelling; [ ] muscle, back, or extremity pain  Psychiatric: [ ] depression, [ ]anxiety, [ ]mood swings, or [ ]difficulty sleeping  Hematologic: [ ] easy bruising, [ ] bleeding gums    [x] All remaining systems negative except as per above.   [  ] Unable to obtain    Vital Signs Last 24 Hrs  T(C): 36.8 (22 Oct 2024 08:14), Max: 36.9 (22 Oct 2024 04:49)  T(F): 98.2 (22 Oct 2024 08:14), Max: 98.4 (22 Oct 2024 04:49)  HR: 94 (22 Oct 2024 08:14) (78 - 120)  BP: 111/79 (22 Oct 2024 08:14) (111/79 - 126/86)  BP(mean): --  RR: 18 (22 Oct 2024 08:14) (18 - 19)  SpO2: 95% (22 Oct 2024 08:14) (95% - 98%)    Parameters below as of 22 Oct 2024 08:14  Patient On (Oxygen Delivery Method): room air      I&O's Summary    21 Oct 2024 07:01  -  22 Oct 2024 07:00  --------------------------------------------------------  IN: 120 mL / OUT: 1000 mL / NET: -880 mL    22 Oct 2024 07:01  -  22 Oct 2024 14:54  --------------------------------------------------------  IN: 250 mL / OUT: 0 mL / NET: 250 mL        PHYSICAL EXAM:  General: No acute distress  HEENT: EOMI  Neck: No JVD  Lungs: Clear to auscultation bilaterally; No rales or wheezing  Heart: Regular rate and rhythm; No murmurs, rubs, or gallops  Abdomen: Soft, non tender, non distended   Extremities: Warm, no edema   Nervous system:  Alert & Oriented X3  Psychiatric: Normal affect  Skin: No rashes or lesions    LABS:  10-22    139  |  108  |  17  ----------------------------<  109[H]  4.0   |  26  |  0.94    Ca    9.3      22 Oct 2024 06:47  Phos  3.6     10-22  Mg     2.3     10-22    TPro  7.1  /  Alb  3.4[L]  /  TBili  0.6  /  DBili  x   /  AST  21  /  ALT  42  /  AlkPhos  68  10-21    Creatinine Trend: 0.94<--, 0.97<--, 1.00<--, 0.93<--, 0.86<--, 0.93<--                        16.6   7.23  )-----------( 263      ( 22 Oct 2024 06:47 )             47.9       Lipid Panel:   Cardiac Enzymes:           RADIOLOGY:< from: MR Head w/ IV Cont (10.20.24 @ 11:44) >    IMPRESSION: ACUTE LEFT MEDULLARY INFARCT. NO ACUTE INTRACRANIAL   HEMORRHAGE.    --- End of Report ---    < end of copied text >  < from: US Physiol Extremity Lower 3+ Level, BI (10.18.24 @ 21:15) >  Findings/  Impression:  Right lower extremity: The ankle brachial index is 1.31. TBI is 0.95 with   digital pressures of 125 mmHg. The pulse waveforms are normal. No   pressure gradient.    Left lower extremity: The ankle brachial index is 1.30. TBI is 0.92 with   digital pressures of 121 mmHg. The pulse waveforms are normal. No   pressure gradient.    --- End of Report ---    < end of copied text >  < from: CT Angio Neck Stroke Protocol w/ IV Cont (10.18.24 @ 13:02) >  IMPRESSION:    1. BRAIN:  No intracranial hemorrhage is appreciated.  No intracranial   mass lesion is appreciated.    2.  RIGHT CAROTID SYSTEM:    No hemodynamically significant stenosis.    3.   LEFT CAROTID SYSTEM:     No hemodynamically significant stenosis.    4.   VERTEBRAL CIRCULATION:    Patent.    5.  ANTERIOR INTRACRANIAL CIRCULATION:     Intracranial atherosclerosis   cavernous and clinoid segments of the internal carotid arteries, mild.    6.  POSTERIOR INTRACRANIAL CIRCULATION:    Intracranial atherosclerosis   left vertebral artery, mild to moderate.    7.  No large vessel occlusion    8.  BRAIN PERFUSION:   No acute infarction of the brain is convincingly   demonstrated.      CRITICAL VALUE:    I discussed this case with the treating emergency   department physician at  10/18/2024 1:35 PM.  Hospital policies for   critical values including read back policy were followed.  The verbal   communication of the critical value supplements this written report.    --- End of Report ---            ZURI ESTRELLA MD; Attending Radiologist  This document has been electronically signed. Oct 18 2024  1:48PM    < end of copied text >  < from: CT Head No Cont (10.17.24 @ 14:04) >    IMPRESSION:  1.  No acute intracranial hemorrhage, mass effect, or midline shift.  2.  Chronic small vessel disease. If there is concern for acute   neurologic compromise, recommend MRI of the brain for further evaluation.        --- End of Report ---          < end of copied text >      ECG [my interpretation]: 10/18/2024 13:10:07    TELEMETRY: NSR, Sinus tachy HR range 65-140s    ECHO: < from: TTE W or WO Ultrasound Enhancing Agent (10.21.24 @ 09:23) >  CONCLUSIONS:      1. Left ventricular cavity is small. Left ventricular wall thickness is mildly increased. Left ventricularsystolic function is hyperdynamic with an ejection fraction of 74 % by Otero's method of disks.   2. Near complete mid cavity obliteration without significant gradient.   3. There is mild (grade 1) left ventricular diastolic dysfunction.   4. Normal right ventricular cavity size and normal right ventricular systolic function.   5. Trileaflet aortic valve. Fibrocalcific aortic valve sclerosis without stenosis.   6. Structurally normal mitral valve with normal leaflet excursion.   7. No mitral regurgitation.   8. Agitated saline injection reveals bubbles in the left heart, consistent with a patent foramen ovale.   9. No pericardial effusion seen.  10. No prior echocardiogram is available for comparison.    ________________________________________________________________________________________  FINDINGS:     Left Ventricle:  A false tendon (normal anatomic variant) is noted in the left ventricular cavity. The left ventricular cavity is small. Left ventricular wall thickness is mildly increased. Left ventricular systolic function is hyperdynamic with a calculated ejection fraction of 74 % by the Otero's biplane method of disks. There are no regional wall motion abnormalities seen. Near complete mid cavity obliteration without significant gradient. There is mild (grade 1) left ventricular diastolic dysfunction.     Right Ventricle:  The right ventricular cavity is normal in size and right ventricular systolic function is normal. Tricuspid annular plane systolic excursion (TAPSE) is 2.0 cm (normal >=1.7 cm).     Left Atrium:  The left atrium is normal in size with an indexed volume of 10.78 ml/m².     Right Atrium:  The right atrium is normal in size.     Interatrial Septum:  Agitated saline injection reveals bubbles in the left heart, consistent with a patent foramen ovale.     Aortic Valve:  The aortic valve appears trileaflet. There is fibrocalcific aortic valve sclerosis without stenosis. There is no evidence of aortic regurgitation.     Mitral Valve:  Structurally normal mitral valve with normal leaflet excursion. There is no evidence of mitral regurgitation.     Tricuspid Valve:  Structurally normal tricuspid valve with normal leaflet excursion. There is trace tricuspid regurgitation. There is no echocardiographicevidence of pulmonary hypertension. Estimated pulmonary artery systolic pressure is 24 mmHg.     Pulmonic Valve:  Structurally normal pulmonic valve with normal leaflet excursion. There is trace pulmonic regurgitation.     Aorta:  The aortic root andascending aorta appear normal in size.     Pericardium:  No pericardial effusion seen.     Systemic Veins:  The inferior vena cava is normal in size measuring 0.90 cm in diameter, (normal <2.1cm) with normal inspiratory collapse (normal >50%) consistent with normal right atrial pressure (~3, range 0-5mmHg).  ____________________________________________________________________    < end of copied text >    < from: CARLOS ALBERTO W or WO Ultrasound Enhancing Agent (10.22.24 @ 10:58) >     CONCLUSIONS:      1. No cardiac source of embolism was identified.   2. No evidence of left atrial or left atrial appendage thrombus.   3. Left ventricular systolic function is normal with an ejection fraction visually estimated at 55 to 60 %.   4. Trace aortic regurgitation.   5. Agitated saline injection reveals bubbles in the left heart, consistent with a patent foramen ovale.   6. Interatrial septum is aneurysmal.   7. No pericardial effusion seen.   8. Compared to the transthoracic echocardiogram performed on 10/21/2024, a CARLOS ALBERTO was performed and additional information provided.    ____________________________________________________________________  CARLOS ALBERTO Procedure:  Local oropharyngeal anesthetic was provided with viscous lidocaine. Study was performed with anesthesia (please see anesthesia record).  Intravenous conscious sedation was provided with 200 mg Propofol. The CARLOS ALBERTO probe was passed without difficulty. The patient tolerated the procedure well and without complications.     ________________________________________________________________________________________  FINDINGS:     Left Ventricle:  Left ventricular systolic function is normal with an ejection fraction visually estimated at 55 to 60%.     Right Ventricle:  The right ventricular cavity is normal in size and right ventricular systolic function is normal.     Left Atrium:  There is no evidence of left atrial or left atrial appendage thrombus. The left atrial appendage emptying velocity is normal at 46 cm/s (normal >40cm/s).     Right Atrium:  There is no evidence of a right atrial thrombus.     Interatrial Septum:  Interatrial septum is aneurysmal. Agitated saline injection reveals bubbles in the left heart, consistent with a patent foramen ovale.     Aortic Valve:  The aortic valve appears trileaflet with normal systolic excursion. There is no aortic valve stenosis. There is trace aortic regurgitation.     Mitral Valve:  Structurally normal mitral valve with normal leafletexcursion. There is no evidence of mitral regurgitation.     Tricuspid Valve:  The tricuspid valve was not well visualized. There is no evidence of tricuspid regurgitation.     Pulmonic Valve:  The pulmonic valve was not well visualized.     Aorta:  The aortic root at the sinuses of Valsalva is normal in size, measuring 3.47 cm (indexed 2.08 cm/m²). There is no atheroma in the visualized portions of the transverse aortic arch. There is no atheroma in the visualized portions of the descending aorta.     Pericardium:  No pericardial effusion seen.  ____________________________________________________________________  QUANTITATIVE DATA:  Left Ventricle Measurements: (Indexed to BSA)     Visualized LV EF%: 55 to 60%    Aorta Measurements: (Normalrange) (Indexed to BSA)     Ao Root d 3.47 cm (3.1 - 3.7 cm) 2.08 cm/m²             ________________________________________________________________________________________    < end of copied text >             PRESENTING CC: right upper and lower extremities numbness    OVERNIGHT EVENTS: No new events overnight. Still c/o Right sided numbness. s/p CARLOS ALBERTO today      PMH:   PAST MEDICAL & SURGICAL HISTORY:  Asthma    GERD (gastroesophageal reflux disease)    Hernia        Allergies    penicillin (Hives; Rash)    Intolerances        MEDICATIONS  (STANDING):  aspirin  chewable 324 milliGRAM(s) Oral daily  atorvastatin 80 milliGRAM(s) Oral at bedtime  enoxaparin Injectable 40 milliGRAM(s) SubCutaneous every 24 hours  pantoprazole    Tablet 40 milliGRAM(s) Oral before breakfast  polyethylene glycol 3350 17 Gram(s) Oral daily    MEDICATIONS  (PRN):  acetaminophen     Tablet .. 650 milliGRAM(s) Oral every 6 hours PRN Temp greater or equal to 38C (100.4F), Mild Pain (1 - 3), Moderate Pain (4 - 6)      FAMILY HISTORY:  Family history of irritable colon (Mother)      Reviewed; no change from my prior note    SOCIAL HISTORY:  Reviewed, no change from my prior note    REVIEW OF SYSTEMS:  Constitutional: [ ] fever, [ ]weight loss,  [ ]fatigue  Eyes: [ ] visual changes  Respiratory: [ ]shortness of breath;  [ ] cough, [ ]wheezing, [ ]chills, [ ]hemoptysis  Cardiovascular: [ ] chest pain, [ ]palpitations, [ ]dizziness,  [ ]leg swelling [ ]syncope  Gastrointestinal: [ ] abdominal pain, [ ]nausea, [ ]vomiting,  [ ]diarrhea   Genitourinary: [ ] dysuria, [ ] hematuria  Neurologic: [ ] headaches [ ] tremors [X ] weakness- Right sided [ ] lightheadedness  Skin: [ ] itching, [ ]burning, [ ] rashes  Endocrine: [ ] heat or cold intolerance  Musculoskeletal: [ ] joint pain or swelling; [ ] muscle, back, or extremity pain  Psychiatric: [ ] depression, [ ]anxiety, [ ]mood swings, or [ ]difficulty sleeping  Hematologic: [ ] easy bruising, [ ] bleeding gums    [x] All remaining systems negative except as per above.   [  ] Unable to obtain    Vital Signs Last 24 Hrs  T(C): 36.8 (22 Oct 2024 08:14), Max: 36.9 (22 Oct 2024 04:49)  T(F): 98.2 (22 Oct 2024 08:14), Max: 98.4 (22 Oct 2024 04:49)  HR: 94 (22 Oct 2024 08:14) (78 - 120)  BP: 111/79 (22 Oct 2024 08:14) (111/79 - 126/86)  BP(mean): --  RR: 18 (22 Oct 2024 08:14) (18 - 19)  SpO2: 95% (22 Oct 2024 08:14) (95% - 98%)    Parameters below as of 22 Oct 2024 08:14  Patient On (Oxygen Delivery Method): room air      I&O's Summary    21 Oct 2024 07:01  -  22 Oct 2024 07:00  --------------------------------------------------------  IN: 120 mL / OUT: 1000 mL / NET: -880 mL    22 Oct 2024 07:01  -  22 Oct 2024 14:54  --------------------------------------------------------  IN: 250 mL / OUT: 0 mL / NET: 250 mL        PHYSICAL EXAM:  General: No acute distress  HEENT: EOMI  Neck: No JVD  Lungs: Clear to auscultation bilaterally; No rales or wheezing  Heart: Regular rate and rhythm; No murmurs, rubs, or gallops  Abdomen: Soft, non tender, non distended   Extremities: Warm, no edema   Nervous system:  Alert & Oriented X3  Psychiatric: Normal affect  Skin: No rashes or lesions    LABS:  10-22    139  |  108  |  17  ----------------------------<  109[H]  4.0   |  26  |  0.94    Ca    9.3      22 Oct 2024 06:47  Phos  3.6     10-22  Mg     2.3     10-22    TPro  7.1  /  Alb  3.4[L]  /  TBili  0.6  /  DBili  x   /  AST  21  /  ALT  42  /  AlkPhos  68  10-21    Creatinine Trend: 0.94<--, 0.97<--, 1.00<--, 0.93<--, 0.86<--, 0.93<--                        16.6   7.23  )-----------( 263      ( 22 Oct 2024 06:47 )             47.9       Lipid Panel:   Cardiac Enzymes:           RADIOLOGY:< from: MR Head w/ IV Cont (10.20.24 @ 11:44) >    IMPRESSION: ACUTE LEFT MEDULLARY INFARCT. NO ACUTE INTRACRANIAL   HEMORRHAGE.    --- End of Report ---    < end of copied text >  < from: US Physiol Extremity Lower 3+ Level, BI (10.18.24 @ 21:15) >  Findings/  Impression:  Right lower extremity: The ankle brachial index is 1.31. TBI is 0.95 with   digital pressures of 125 mmHg. The pulse waveforms are normal. No   pressure gradient.    Left lower extremity: The ankle brachial index is 1.30. TBI is 0.92 with   digital pressures of 121 mmHg. The pulse waveforms are normal. No   pressure gradient.    --- End of Report ---    < end of copied text >  < from: CT Angio Neck Stroke Protocol w/ IV Cont (10.18.24 @ 13:02) >  IMPRESSION:    1. BRAIN:  No intracranial hemorrhage is appreciated.  No intracranial   mass lesion is appreciated.    2.  RIGHT CAROTID SYSTEM:    No hemodynamically significant stenosis.    3.   LEFT CAROTID SYSTEM:     No hemodynamically significant stenosis.    4.   VERTEBRAL CIRCULATION:    Patent.    5.  ANTERIOR INTRACRANIAL CIRCULATION:     Intracranial atherosclerosis   cavernous and clinoid segments of the internal carotid arteries, mild.    6.  POSTERIOR INTRACRANIAL CIRCULATION:    Intracranial atherosclerosis   left vertebral artery, mild to moderate.    7.  No large vessel occlusion    8.  BRAIN PERFUSION:   No acute infarction of the brain is convincingly   demonstrated.      CRITICAL VALUE:    I discussed this case with the treating emergency   department physician at  10/18/2024 1:35 PM.  Hospital policies for   critical values including read back policy were followed.  The verbal   communication of the critical value supplements this written report.    --- End of Report ---            ZURI ESTRELLA MD; Attending Radiologist  This document has been electronically signed. Oct 18 2024  1:48PM    < end of copied text >  < from: CT Head No Cont (10.17.24 @ 14:04) >    IMPRESSION:  1.  No acute intracranial hemorrhage, mass effect, or midline shift.  2.  Chronic small vessel disease. If there is concern for acute   neurologic compromise, recommend MRI of the brain for further evaluation.        --- End of Report ---          < end of copied text >      ECG [my interpretation]: 10/18/2024 13:10:07    TELEMETRY: NSR, Sinus tachy HR range 65-140s    ECHO: < from: TTE W or WO Ultrasound Enhancing Agent (10.21.24 @ 09:23) >  CONCLUSIONS:      1. Left ventricular cavity is small. Left ventricular wall thickness is mildly increased. Left ventricularsystolic function is hyperdynamic with an ejection fraction of 74 % by Otero's method of disks.   2. Near complete mid cavity obliteration without significant gradient.   3. There is mild (grade 1) left ventricular diastolic dysfunction.   4. Normal right ventricular cavity size and normal right ventricular systolic function.   5. Trileaflet aortic valve. Fibrocalcific aortic valve sclerosis without stenosis.   6. Structurally normal mitral valve with normal leaflet excursion.   7. No mitral regurgitation.   8. Agitated saline injection reveals bubbles in the left heart, consistent with a patent foramen ovale.   9. No pericardial effusion seen.  10. No prior echocardiogram is available for comparison.    ________________________________________________________________________________________  FINDINGS:     Left Ventricle:  A false tendon (normal anatomic variant) is noted in the left ventricular cavity. The left ventricular cavity is small. Left ventricular wall thickness is mildly increased. Left ventricular systolic function is hyperdynamic with a calculated ejection fraction of 74 % by the Otero's biplane method of disks. There are no regional wall motion abnormalities seen. Near complete mid cavity obliteration without significant gradient. There is mild (grade 1) left ventricular diastolic dysfunction.     Right Ventricle:  The right ventricular cavity is normal in size and right ventricular systolic function is normal. Tricuspid annular plane systolic excursion (TAPSE) is 2.0 cm (normal >=1.7 cm).     Left Atrium:  The left atrium is normal in size with an indexed volume of 10.78 ml/m².     Right Atrium:  The right atrium is normal in size.     Interatrial Septum:  Agitated saline injection reveals bubbles in the left heart, consistent with a patent foramen ovale.     Aortic Valve:  The aortic valve appears trileaflet. There is fibrocalcific aortic valve sclerosis without stenosis. There is no evidence of aortic regurgitation.     Mitral Valve:  Structurally normal mitral valve with normal leaflet excursion. There is no evidence of mitral regurgitation.     Tricuspid Valve:  Structurally normal tricuspid valve with normal leaflet excursion. There is trace tricuspid regurgitation. There is no echocardiographicevidence of pulmonary hypertension. Estimated pulmonary artery systolic pressure is 24 mmHg.     Pulmonic Valve:  Structurally normal pulmonic valve with normal leaflet excursion. There is trace pulmonic regurgitation.     Aorta:  The aortic root andascending aorta appear normal in size.     Pericardium:  No pericardial effusion seen.     Systemic Veins:  The inferior vena cava is normal in size measuring 0.90 cm in diameter, (normal <2.1cm) with normal inspiratory collapse (normal >50%) consistent with normal right atrial pressure (~3, range 0-5mmHg).  ____________________________________________________________________    < end of copied text >    < from: CARLOS ALBERTO W or WO Ultrasound Enhancing Agent (10.22.24 @ 10:58) >     CONCLUSIONS:      1. No cardiac source of embolism was identified.   2. No evidence of left atrial or left atrial appendage thrombus.   3. Left ventricular systolic function is normal with an ejection fraction visually estimated at 55 to 60 %.   4. Trace aortic regurgitation.   5. Agitated saline injection reveals bubbles in the left heart, consistent with a patent foramen ovale.   6. Interatrial septum is aneurysmal.   7. No pericardial effusion seen.   8. Compared to the transthoracic echocardiogram performed on 10/21/2024, a CARLOS ALBERTO was performed and additional information provided.    ____________________________________________________________________  CARLOS ALBERTO Procedure:  Local oropharyngeal anesthetic was provided with viscous lidocaine. Study was performed with anesthesia (please see anesthesia record).  Intravenous conscious sedation was provided with 200 mg Propofol. The CARLOS ALBERTO probe was passed without difficulty. The patient tolerated the procedure well and without complications.     ________________________________________________________________________________________  FINDINGS:     Left Ventricle:  Left ventricular systolic function is normal with an ejection fraction visually estimated at 55 to 60%.     Right Ventricle:  The right ventricular cavity is normal in size and right ventricular systolic function is normal.     Left Atrium:  There is no evidence of left atrial or left atrial appendage thrombus. The left atrial appendage emptying velocity is normal at 46 cm/s (normal >40cm/s).     Right Atrium:  There is no evidence of a right atrial thrombus.     Interatrial Septum:  Interatrial septum is aneurysmal. Agitated saline injection reveals bubbles in the left heart, consistent with a patent foramen ovale.     Aortic Valve:  The aortic valve appears trileaflet with normal systolic excursion. There is no aortic valve stenosis. There is trace aortic regurgitation.     Mitral Valve:  Structurally normal mitral valve with normal leafletexcursion. There is no evidence of mitral regurgitation.     Tricuspid Valve:  The tricuspid valve was not well visualized. There is no evidence of tricuspid regurgitation.     Pulmonic Valve:  The pulmonic valve was not well visualized.     Aorta:  The aortic root at the sinuses of Valsalva is normal in size, measuring 3.47 cm (indexed 2.08 cm/m²). There is no atheroma in the visualized portions of the transverse aortic arch. There is no atheroma in the visualized portions of the descending aorta.     Pericardium:  No pericardial effusion seen.  ____________________________________________________________________  QUANTITATIVE DATA:  Left Ventricle Measurements: (Indexed to BSA)     Visualized LV EF%: 55 to 60%    Aorta Measurements: (Normalrange) (Indexed to BSA)     Ao Root d 3.47 cm (3.1 - 3.7 cm) 2.08 cm/m²             ________________________________________________________________________________________    < end of copied text >

## 2024-10-22 NOTE — PROGRESS NOTE ADULT - ATTENDING COMMENTS
57 y/o Bulgarian speaking, male from home w/ PMH of asthma and GERD presents to the ER w/ c/o Rt sided weakness and numbness that started at 7am this morning. His weakness is persistent and has not improved. He reports that he had Rt arm cramp a week ago which resolved. He denies any prior hx of CVA, TIA. Pt denies chest pain, palpitations, sob, or other complaints. Admitted for CVA w/u    Pt was evaluated, ongoing Rt sided weakness. Also complained of constipation.     PE as above     Labs reviewed-cbc, bmp    A/P:  #Acute left medullary infarct   #PFO  #Hyperlipidemia  #Hypertriglyceridemia   #Asthma  #GERD  #DVT ppx     -patient p/w RT sided weakness, left facial droop.  -CT head, CT perfusion, CT Angio head and neck- unremarkable except mild to moderate intracranial stenosis   -MR head showed-ACUTE LEFT MEDULLARY INFARCT  -No events on tele so far.   -ECHO w/ PFO.  Check US duplex to r/o DVT.   - CARLOS ALBERTO performed - negative for thrombus or etiology for embolism   - EP consult - plan for ILR placement thursday  -D/w neuro, change to asa 325 and statin   -Hba1C 6.4%, Lipid profile noted   -PT consult- home PT  -SnS eval pending   -c/w  inhalers   -c/w  PPI  -C/w  Lovenox SC   -D/w daughter at bedside.

## 2024-10-23 ENCOUNTER — TRANSCRIPTION ENCOUNTER (OUTPATIENT)
Age: 59
End: 2024-10-23

## 2024-10-23 DIAGNOSIS — K21.9 GASTRO-ESOPHAGEAL REFLUX DISEASE WITHOUT ESOPHAGITIS: ICD-10-CM

## 2024-10-23 DIAGNOSIS — S05.00XA INJURY OF CONJUNCTIVA AND CORNEAL ABRASION WITHOUT FOREIGN BODY, UNSPECIFIED EYE, INITIAL ENCOUNTER: ICD-10-CM

## 2024-10-23 LAB
ANION GAP SERPL CALC-SCNC: 6 MMOL/L — SIGNIFICANT CHANGE UP (ref 5–17)
BUN SERPL-MCNC: 19 MG/DL — HIGH (ref 7–18)
CALCIUM SERPL-MCNC: 8.8 MG/DL — SIGNIFICANT CHANGE UP (ref 8.4–10.5)
CHLORIDE SERPL-SCNC: 109 MMOL/L — HIGH (ref 96–108)
CO2 SERPL-SCNC: 25 MMOL/L — SIGNIFICANT CHANGE UP (ref 22–31)
CREAT SERPL-MCNC: 1 MG/DL — SIGNIFICANT CHANGE UP (ref 0.5–1.3)
EGFR: 86 ML/MIN/1.73M2 — SIGNIFICANT CHANGE UP
GLUCOSE SERPL-MCNC: 108 MG/DL — HIGH (ref 70–99)
HCT VFR BLD CALC: 46.1 % — SIGNIFICANT CHANGE UP (ref 39–50)
HGB BLD-MCNC: 15.7 G/DL — SIGNIFICANT CHANGE UP (ref 13–17)
MAGNESIUM SERPL-MCNC: 2.3 MG/DL — SIGNIFICANT CHANGE UP (ref 1.6–2.6)
MCHC RBC-ENTMCNC: 32.4 PG — SIGNIFICANT CHANGE UP (ref 27–34)
MCHC RBC-ENTMCNC: 34.1 GM/DL — SIGNIFICANT CHANGE UP (ref 32–36)
MCV RBC AUTO: 95.2 FL — SIGNIFICANT CHANGE UP (ref 80–100)
NRBC # BLD: 0 /100 WBCS — SIGNIFICANT CHANGE UP (ref 0–0)
PHOSPHATE SERPL-MCNC: 3.1 MG/DL — SIGNIFICANT CHANGE UP (ref 2.5–4.5)
PLATELET # BLD AUTO: 249 K/UL — SIGNIFICANT CHANGE UP (ref 150–400)
POTASSIUM SERPL-MCNC: 4 MMOL/L — SIGNIFICANT CHANGE UP (ref 3.5–5.3)
POTASSIUM SERPL-SCNC: 4 MMOL/L — SIGNIFICANT CHANGE UP (ref 3.5–5.3)
RBC # BLD: 4.84 M/UL — SIGNIFICANT CHANGE UP (ref 4.2–5.8)
RBC # FLD: 12.4 % — SIGNIFICANT CHANGE UP (ref 10.3–14.5)
SODIUM SERPL-SCNC: 140 MMOL/L — SIGNIFICANT CHANGE UP (ref 135–145)
WBC # BLD: 5.59 K/UL — SIGNIFICANT CHANGE UP (ref 3.8–10.5)
WBC # FLD AUTO: 5.59 K/UL — SIGNIFICANT CHANGE UP (ref 3.8–10.5)

## 2024-10-23 PROCEDURE — 99232 SBSQ HOSP IP/OBS MODERATE 35: CPT | Mod: GC

## 2024-10-23 PROCEDURE — 99233 SBSQ HOSP IP/OBS HIGH 50: CPT

## 2024-10-23 RX ORDER — LIDOCAINE HCL 60 MG/3 ML
10 SYRINGE (ML) INJECTION ONCE
Refills: 0 | Status: COMPLETED | OUTPATIENT
Start: 2024-10-24 | End: 2024-10-24

## 2024-10-23 RX ORDER — GLYCERIN/PROPYLENE GLYCOL 0.6 %-0.6%
2 DROPPERETTE, SINGLE-USE DROP DISPENSER OPHTHALMIC (EYE) THREE TIMES A DAY
Refills: 0 | Status: DISCONTINUED | OUTPATIENT
Start: 2024-10-23 | End: 2024-10-24

## 2024-10-23 RX ORDER — ASPIRIN/MAG CARB/ALUMINUM AMIN 325 MG
1 TABLET ORAL
Qty: 90 | Refills: 0
Start: 2024-10-23

## 2024-10-23 RX ORDER — GLYCERIN/PROPYLENE GLYCOL 0.6 %-0.6%
1 DROPPERETTE, SINGLE-USE DROP DISPENSER OPHTHALMIC (EYE)
Qty: 1 | Refills: 0
Start: 2024-10-23

## 2024-10-23 RX ORDER — TOBRAMYCIN 3 MG/ML
2 SOLUTION OPHTHALMIC EVERY 4 HOURS
Refills: 0 | Status: DISCONTINUED | OUTPATIENT
Start: 2024-10-23 | End: 2024-10-23

## 2024-10-23 RX ADMIN — PANTOPRAZOLE SODIUM 40 MILLIGRAM(S): 40 TABLET, DELAYED RELEASE ORAL at 06:31

## 2024-10-23 RX ADMIN — Medication 2 DROP(S): at 13:35

## 2024-10-23 RX ADMIN — Medication 650 MILLIGRAM(S): at 21:33

## 2024-10-23 RX ADMIN — Medication 80 MILLIGRAM(S): at 21:33

## 2024-10-23 RX ADMIN — Medication 2 DROP(S): at 06:30

## 2024-10-23 RX ADMIN — TOBRAMYCIN 2 DROP(S): 3 SOLUTION OPHTHALMIC at 06:31

## 2024-10-23 RX ADMIN — Medication 2 DROP(S): at 21:33

## 2024-10-23 RX ADMIN — Medication 650 MILLIGRAM(S): at 22:33

## 2024-10-23 RX ADMIN — Medication 324 MILLIGRAM(S): at 12:02

## 2024-10-23 NOTE — PROGRESS NOTE ADULT - ASSESSMENT
58-year-old male, AAOx3, walks independently with past medical history significant for asthma and GERD  presenting with Right-sided numbness.  Patient noted with +PFO on echo. Cardiology was consulted

## 2024-10-23 NOTE — DISCHARGE NOTE PROVIDER - NSDCMRMEDTOKEN_GEN_ALL_CORE_FT
cetirizine 10 mg oral capsule: 1 cap(s) orally once a day  Dexilant 60 mg oral delayed release capsule: 1 cap(s) orally once a day  dicyclomine 10 mg oral capsule: 2 cap(s) orally once a day   aspirin 325 mg oral capsule: 1 cap(s) orally once a day  cetirizine 10 mg oral capsule: 1 cap(s) orally once a day  Dexilant 60 mg oral delayed release capsule: 1 cap(s) orally once a day  dicyclomine 10 mg oral capsule: 2 cap(s) orally once a day  Lipitor 80 mg oral tablet: 1 tab(s) orally once a day (at bedtime)  Lubricant Eye Drops ophthalmic solution: 1 drop(s) to each affected eye once a day   aspirin 325 mg oral capsule: 1 cap(s) orally once a day  atorvastatin 80 mg oral tablet: 1 tab(s) orally once a day  cetirizine 10 mg oral capsule: 1 cap(s) orally once a day  Dexilant 60 mg oral delayed release capsule: 1 cap(s) orally once a day  dicyclomine 10 mg oral capsule: 2 cap(s) orally once a day  Lubricant Eye Drops ophthalmic solution: 1 drop(s) to each affected eye once a day   aspirin 325 mg oral capsule: 1 cap(s) orally once a day  aspirin 325 mg oral capsule: 1 cap(s) orally once a day  atorvastatin 80 mg oral tablet: 1 tab(s) orally once a day (at bedtime)  cetirizine 10 mg oral capsule: 1 cap(s) orally once a day  Dexilant 60 mg oral delayed release capsule: 1 cap(s) orally once a day  dicyclomine 10 mg oral capsule: 2 cap(s) orally once a day  Lubricant Eye Drops ophthalmic solution: 1 drop(s) to each affected eye once a day

## 2024-10-23 NOTE — PROGRESS NOTE ADULT - PROBLEM SELECTOR PLAN 1
P/w Left  sided facial droop and rigth sided weakness   MRI brain: acute left medullary infarct  TTE: PFO  - per Neuro:  ASA 325mg po daily (long term)  - atorvastatin 80  Neurology Dr. Wetzel following  Cardiology Dr Poole following  BLE dopper negative for DVT  - CARLOS ALBERTO negative, plan for loop recorder with EP 10/24 Dr Rain P/w Left  sided facial droop and rigth sided weakness   MRI brain: acute left medullary infarct  TTE: PFO  - per Neuro:  ASA 325mg po daily (long term)  - atorvastatin 80  Neurology Dr. Wetzel following  Cardiology Dr Poole following  BLE dopper negative for DVT  - CARLOS ALBERTO negative  - Plan for loop recorder with EP 10/24 Dr Rain

## 2024-10-23 NOTE — PROGRESS NOTE ADULT - SUBJECTIVE AND OBJECTIVE BOX
PGY-1 Progress Note discussed with attending      PLEASE CONTACT ON CALL TEAM:  - On Call Team (Please refer to Christiano) FROM 5:00 PM - 8:30PM  - Nightfloat Team FROM 8:30 -7:30 AM    INTERVAL HPI/OVERNIGHT EVENTS:     [facility] at [facility]. Reports left eye pain , redness, heaviness, and bluriness.        MEDICATIONS  (STANDING):  artificial  tears Solution 2 Drop(s) Left EYE three times a day  aspirin  chewable 324 milliGRAM(s) Oral daily  atorvastatin 80 milliGRAM(s) Oral at bedtime  enoxaparin Injectable 40 milliGRAM(s) SubCutaneous every 24 hours  pantoprazole    Tablet 40 milliGRAM(s) Oral before breakfast  polyethylene glycol 3350 17 Gram(s) Oral daily    MEDICATIONS  (PRN):  acetaminophen     Tablet .. 650 milliGRAM(s) Oral every 6 hours PRN Temp greater or equal to 38C (100.4F), Mild Pain (1 - 3), Moderate Pain (4 - 6)      Vital Signs Last 24 Hrs  T(C): 36.2 (23 Oct 2024 10:35), Max: 37.1 (22 Oct 2024 15:45)  T(F): 97.2 (23 Oct 2024 10:35), Max: 98.8 (22 Oct 2024 15:45)  HR: 79 (23 Oct 2024 10:35) (78 - 103)  BP: 123/89 (23 Oct 2024 10:35) (110/90 - 142/119)  BP(mean): 101 (23 Oct 2024 10:35) (98 - 101)  RR: 18 (23 Oct 2024 10:35) (18 - 19)  SpO2: 95% (23 Oct 2024 10:35) (93% - 98%)    Parameters below as of 23 Oct 2024 10:35  Patient On (Oxygen Delivery Method): room air        PHYSICAL EXAMINATION:  GENERAL: NAD  HEAD:  Atraumatic, Normocephalic  EYES:  Left eye redness, swollen eye lid. Extraocular motions intact.  NECK: Supple  CHEST/LUNG: Clear to auscultation  HEART: Regular rate and rhythm; No murmurs, rubs, or gallops  ABDOMEN: Soft, Nontender, Bowel sounds present, no masses on palpation  NERVOUS SYSTEM:  Alert and oriented x 3. Right leg and Right arm weakness.  EXTREMITIES:  No BLE edema  SKIN: warm, dry                          15.7   5.59  )-----------( 249      ( 23 Oct 2024 06:53 )             46.1     10-23    140  |  109[H]  |  19[H]  ----------------------------<  108[H]  4.0   |  25  |  1.00    Ca    8.8      23 Oct 2024 06:53  Phos  3.1     10-23  Mg     2.3     10-23                I&O's Summary    22 Oct 2024 07:01  -  23 Oct 2024 07:00  --------------------------------------------------------  IN: 300 mL / OUT: 550 mL / NET: -250 mL

## 2024-10-23 NOTE — PROGRESS NOTE ADULT - SUBJECTIVE AND OBJECTIVE BOX
PRESENTING CC: right upper and lower extremities numbness    OVERNIGHT EVENTS: No new events overnight. Still c/o Right sided numbness, left eye pain  PMH:   PAST MEDICAL & SURGICAL HISTORY:  Asthma    GERD (gastroesophageal reflux disease)    Hernia        Allergies    penicillin (Hives; Rash)    Intolerances        MEDICATIONS  (STANDING):  artificial  tears Solution 2 Drop(s) Left EYE three times a day  aspirin  chewable 324 milliGRAM(s) Oral daily  atorvastatin 80 milliGRAM(s) Oral at bedtime  enoxaparin Injectable 40 milliGRAM(s) SubCutaneous every 24 hours  pantoprazole    Tablet 40 milliGRAM(s) Oral before breakfast  polyethylene glycol 3350 17 Gram(s) Oral daily    MEDICATIONS  (PRN):  acetaminophen     Tablet .. 650 milliGRAM(s) Oral every 6 hours PRN Temp greater or equal to 38C (100.4F), Mild Pain (1 - 3), Moderate Pain (4 - 6)      FAMILY HISTORY:  Family history of irritable colon (Mother)      Reviewed; no change from my prior note    SOCIAL HISTORY:  Reviewed, no change from my prior note    REVIEW OF SYSTEMS:  Constitutional: [ ] fever, [ ]weight loss,  [ ]fatigue  Eyes: [ ] visual changes  Respiratory: [ ]shortness of breath;  [ ] cough, [ ]wheezing, [ ]chills, [ ]hemoptysis  Cardiovascular: [ ] chest pain, [ ]palpitations, [ ]dizziness,  [ ]leg swelling [ ]syncope  Gastrointestinal: [ ] abdominal pain, [ ]nausea, [ ]vomiting,  [ ]diarrhea   Genitourinary: [ ] dysuria, [ ] hematuria  Neurologic: [ ] headaches [ ] tremors [X ] weakness-Right side [ ] lightheadedness  Skin: [ ] itching, [ ]burning, [ ] rashes  Endocrine: [ ] heat or cold intolerance  Musculoskeletal: [ ] joint pain or swelling; [ ] muscle, back, or extremity pain  Psychiatric: [ ] depression, [ ]anxiety, [ ]mood swings, or [ ]difficulty sleeping  Hematologic: [ ] easy bruising, [ ] bleeding gums    [x] All remaining systems negative except as per above.   [  ] Unable to obtain    Vital Signs Last 24 Hrs  T(C): 36.2 (23 Oct 2024 10:35), Max: 37.1 (22 Oct 2024 15:45)  T(F): 97.2 (23 Oct 2024 10:35), Max: 98.8 (22 Oct 2024 15:45)  HR: 79 (23 Oct 2024 10:35) (78 - 103)  BP: 123/89 (23 Oct 2024 10:35) (110/90 - 142/119)  BP(mean): 101 (23 Oct 2024 10:35) (98 - 101)  RR: 18 (23 Oct 2024 10:35) (18 - 19)  SpO2: 95% (23 Oct 2024 10:35) (93% - 98%)    Parameters below as of 23 Oct 2024 10:35  Patient On (Oxygen Delivery Method): room air      I&O's Summary    22 Oct 2024 07:01  -  23 Oct 2024 07:00  --------------------------------------------------------  IN: 300 mL / OUT: 550 mL / NET: -250 mL        PHYSICAL EXAM:  General: No acute distress  HEENT: EOMI  Neck: No JVD  Lungs: Clear to auscultation bilaterally; No rales or wheezing  Heart: Regular rate and rhythm; No murmurs, rubs, or gallops  Abdomen: Soft, non tender, non distended   Extremities: Warm, no edema   Nervous system:  Alert & Oriented X3, rt. upper extr. weakness  Psychiatric: Normal affect  Skin: No rashes or lesions    LABS:  10-23    140  |  109[H]  |  19[H]  ----------------------------<  108[H]  4.0   |  25  |  1.00    Ca    8.8      23 Oct 2024 06:53  Phos  3.1     10-23  Mg     2.3     10-23      Creatinine Trend: 1.00<--, 0.94<--, 0.97<--, 1.00<--, 0.93<--, 0.86<--                        15.7   5.59  )-----------( 249      ( 23 Oct 2024 06:53 )             46.1       Lipid Panel:   Cardiac Enzymes:     ADIOLOGY:< from: MR Head w/ IV Cont (10.20.24 @ 11:44) >    IMPRESSION: ACUTE LEFT MEDULLARY INFARCT. NO ACUTE INTRACRANIAL   HEMORRHAGE.    --- End of Report ---    < end of copied text >  < from: US Physiol Extremity Lower 3+ Level, BI (10.18.24 @ 21:15) >  Findings/  Impression:  Right lower extremity: The ankle brachial index is 1.31. TBI is 0.95 with   digital pressures of 125 mmHg. The pulse waveforms are normal. No   pressure gradient.    Left lower extremity: The ankle brachial index is 1.30. TBI is 0.92 with   digital pressures of 121 mmHg. The pulse waveforms are normal. No   pressure gradient.    --- End of Report ---    < end of copied text >  < from: CT Angio Neck Stroke Protocol w/ IV Cont (10.18.24 @ 13:02) >  IMPRESSION:    1. BRAIN:  No intracranial hemorrhage is appreciated.  No intracranial   mass lesion is appreciated.    2.  RIGHT CAROTID SYSTEM:    No hemodynamically significant stenosis.    3.   LEFT CAROTID SYSTEM:     No hemodynamically significant stenosis.    4.   VERTEBRAL CIRCULATION:    Patent.    5.  ANTERIOR INTRACRANIAL CIRCULATION:     Intracranial atherosclerosis   cavernous and clinoid segments of the internal carotid arteries, mild.    6.  POSTERIOR INTRACRANIAL CIRCULATION:    Intracranial atherosclerosis   left vertebral artery, mild to moderate.    7.  No large vessel occlusion    8.  BRAIN PERFUSION:   No acute infarction of the brain is convincingly   demonstrated.      CRITICAL VALUE:    I discussed this case with the treating emergency   department physician at  10/18/2024 1:35 PM.  Hospital policies for   critical values including read back policy were followed.  The verbal   communication of the critical value supplements this written report.    --- End of Report ---            ZURI ESTRELLA MD; Attending Radiologist  This document has been electronically signed. Oct 18 2024  1:48PM    < end of copied text >  < from: CT Head No Cont (10.17.24 @ 14:04) >    IMPRESSION:  1.  No acute intracranial hemorrhage, mass effect, or midline shift.  2.  Chronic small vessel disease. If there is concern for acute   neurologic compromise, recommend MRI of the brain for further evaluation.        --- End of Report ---          < end of copied text >      ECG [my interpretation]: 10/18/2024 13:10:07    TELEMETRY: NSR, Sinus tachy HR range 65-140s    ECHO: < from: TTE W or WO Ultrasound Enhancing Agent (10.21.24 @ 09:23) >  CONCLUSIONS:      1. Left ventricular cavity is small. Left ventricular wall thickness is mildly increased. Left ventricularsystolic function is hyperdynamic with an ejection fraction of 74 % by Otero's method of disks.   2. Near complete mid cavity obliteration without significant gradient.   3. There is mild (grade 1) left ventricular diastolic dysfunction.   4. Normal right ventricular cavity size and normal right ventricular systolic function.   5. Trileaflet aortic valve. Fibrocalcific aortic valve sclerosis without stenosis.   6. Structurally normal mitral valve with normal leaflet excursion.   7. No mitral regurgitation.   8. Agitated saline injection reveals bubbles in the left heart, consistent with a patent foramen ovale.   9. No pericardial effusion seen.  10. No prior echocardiogram is available for comparison.    ________________________________________________________________________________________  FINDINGS:     Left Ventricle:  A false tendon (normal anatomic variant) is noted in the left ventricular cavity. The left ventricular cavity is small. Left ventricular wall thickness is mildly increased. Left ventricular systolic function is hyperdynamic with a calculated ejection fraction of 74 % by the Otero's biplane method of disks. There are no regional wall motion abnormalities seen. Near complete mid cavity obliteration without significant gradient. There is mild (grade 1) left ventricular diastolic dysfunction.     Right Ventricle:  The right ventricular cavity is normal in size and right ventricular systolic function is normal. Tricuspid annular plane systolic excursion (TAPSE) is 2.0 cm (normal >=1.7 cm).     Left Atrium:  The left atrium is normal in size with an indexed volume of 10.78 ml/m².     Right Atrium:  The right atrium is normal in size.     Interatrial Septum:  Agitated saline injection reveals bubbles in the left heart, consistent with a patent foramen ovale.     Aortic Valve:  The aortic valve appears trileaflet. There is fibrocalcific aortic valve sclerosis without stenosis. There is no evidence of aortic regurgitation.     Mitral Valve:  Structurally normal mitral valve with normal leaflet excursion. There is no evidence of mitral regurgitation.     Tricuspid Valve:  Structurally normal tricuspid valve with normal leaflet excursion. There is trace tricuspid regurgitation. There is no echocardiographicevidence of pulmonary hypertension. Estimated pulmonary artery systolic pressure is 24 mmHg.     Pulmonic Valve:  Structurally normal pulmonic valve with normal leaflet excursion. There is trace pulmonic regurgitation.     Aorta:  The aortic root andascending aorta appear normal in size.     Pericardium:  No pericardial effusion seen.     Systemic Veins:  The inferior vena cava is normal in size measuring 0.90 cm in diameter, (normal <2.1cm) with normal inspiratory collapse (normal >50%) consistent with normal right atrial pressure (~3, range 0-5mmHg).  ____________________________________________________________________    < end of copied text >    < from: CARLOS ALBERTO W or WO Ultrasound Enhancing Agent (10.22.24 @ 10:58) >     CONCLUSIONS:      1. No cardiac source of embolism was identified.   2. No evidence of left atrial or left atrial appendage thrombus.   3. Left ventricular systolic function is normal with an ejection fraction visually estimated at 55 to 60 %.   4. Trace aortic regurgitation.   5. Agitated saline injection reveals bubbles in the left heart, consistent with a patent foramen ovale.   6. Interatrial septum is aneurysmal.   7. No pericardial effusion seen.   8. Compared to the transthoracic echocardiogram performed on 10/21/2024, a CARLOS ALBERTO was performed and additional information provided.    ____________________________________________________________________  CARLOS ALBERTO Procedure:  Local oropharyngeal anesthetic was provided with viscous lidocaine. Study was performed with anesthesia (please see anesthesia record).  Intravenous conscious sedation was provided with 200 mg Propofol. The CARLOS ALBERTO probe was passed without difficulty. The patient tolerated the procedure well and without complications.     ________________________________________________________________________________________  FINDINGS:     Left Ventricle:  Left ventricular systolic function is normal with an ejection fraction visually estimated at 55 to 60%.     Right Ventricle:  The right ventricular cavity is normal in size and right ventricular systolic function is normal.     Left Atrium:  There is no evidence of left atrial or left atrial appendage thrombus. The left atrial appendage emptying velocity is normal at 46 cm/s (normal >40cm/s).     Right Atrium:  There is no evidence of a right atrial thrombus.     Interatrial Septum:  Interatrial septum is aneurysmal. Agitated saline injection reveals bubbles in the left heart, consistent with a patent foramen ovale.     Aortic Valve:  The aortic valve appears trileaflet with normal systolic excursion. There is no aortic valve stenosis. There is trace aortic regurgitation.     Mitral Valve:  Structurally normal mitral valve with normal leafletexcursion. There is no evidence of mitral regurgitation.     Tricuspid Valve:  The tricuspid valve was not well visualized. There is no evidence of tricuspid regurgitation.     Pulmonic Valve:  The pulmonic valve was not well visualized.     Aorta:  The aortic root at the sinuses of Valsalva is normal in size, measuring 3.47 cm (indexed 2.08 cm/m²). There is no atheroma in the visualized portions of the transverse aortic arch. There is no atheroma in the visualized portions of the descending aorta.     Pericardium:  No pericardial effusion seen.  ____________________________________________________________________  QUANTITATIVE DATA:  Left Ventricle Measurements: (Indexed to BSA)     Visualized LV EF%: 55 to 60%    Aorta Measurements: (Normalrange) (Indexed to BSA)     Ao Root d 3.47 cm (3.1 - 3.7 cm) 2.08 cm/m²             ________________________________________________________________________________________    < end of copied text >

## 2024-10-23 NOTE — DISCHARGE NOTE PROVIDER - NSDCFUADDAPPT_GEN_ALL_CORE_FT
APPTS ARE READY TO BE MADE: [X] YES    Best Family or Patient Contact (if needed):    Additional Information about above appointments (if needed):    1: PCP  2: Opthalmologist  3: Cardiologist  4. Neurologist    Other comments or requests:   APPTS ARE READY TO BE MADE: [X] YES    Best Family or Patient Contact (if needed):    Additional Information about above appointments (if needed):    1: PCP  2: Opthalmologist  3: Cardiologist and Electrophysiologist  4. Neurologist    Other comments or requests:   APPTS ARE READY TO BE MADE: [X] YES    Best Family or Patient Contact (if needed):    Additional Information about above appointments (if needed):    1: Neurologist  2: Opthalmologist  3: Cardiologist and Electrophysiologist  4. PCP    Other comments or requests:   APPTS ARE READY TO BE MADE: [X] YES    Best Family or Patient Contact (if needed):    Additional Information about above appointments (if needed):    1: Neurologist  2: Opthalmologist  3: Cardiologist and Electrophysiologist  4. PCP    Other comments or requests:    Prior to outreaching the patient, it was visible that the patient has secured a follow up appointment for Electrophysiology with Dr. Rain on 10/30/2024 at 11:30am, [phone] [place] [address]  [phone], which was not scheduled by our team.    Prior to outreaching the patient, it was visible that the patient has secured a follow up appointment for Cardiology with Dr. Poole on 10/30/2024 at 4pm, [phone] [facility]  [phone], which was not scheduled by our team.   APPTS ARE READY TO BE MADE: [X] YES    Best Family or Patient Contact (if needed):    Additional Information about above appointments (if needed):    1: Neurologist  2: Opthalmologist  3: Cardiologist and Electrophysiologist  4. PCP    Other comments or requests:    Prior to outreaching the patient, it was visible that the patient has secured a follow up appointment for Electrophysiology with Dr. Rain on 10/30/2024 at 11:30am, [phone] [place] [address]  [phone], which was not scheduled by our team.    Prior to outreaching the patient, it was visible that the patient has secured a follow up appointment for Cardiology with Dr. Poole on 10/30/2024 at 4pm, [phone] [place] [address]  [phone], which was not scheduled by our team.    Patient informed us they already have secured a follow up appointment which was not scheduled by our team. 11/07 9:00am with Dr. Chiu

## 2024-10-23 NOTE — PROGRESS NOTE ADULT - ASSESSMENT
Assessment and Plan :     Assessment:   58 R hand dominant Male w/PMH of asthma and GERD presented to ED w/ R sided weakness/[id number] on the day of admission. CTH negative for any acute intracranial pathology, CTA H/N w/no LVO or HGS and a negative CTP. Admitted to [facility] for further workup. 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     Impression : Acute L medullary infarct in MRI likely could be 2/2 small vessel disease i/s/o vascular risk factors, TTE W/PFO w/negative B/L LE doppler screen could be an incidental finding.       Reccs:     - MRI : Acute L medullary infarct  - TTE: PFO+, CARLOS ALBERTO confirms PFO, No LA/CORINE thrombus.   - B/L LE negative  - C/W ASA 325mg po daily (long term/life long) i/s/o of PFO.  - On HIS - Atorva 80mg po QHS, Titrate to LDL < 70mg/dl.  - Cards Cxed and reccs appreciated.   - Pending ILR placement tomorrow per STROKE AF Study.   - Stroke labs :   HbA1C: 6.4- DM management per primary team.   LDL: 101  - Will need Outpt Neuro F/U in 2 weeks upon D/C.   - Outpt Ophthal and Dental eval.  - PT/OT eval and follow up.   - Further care per primary team.       To be discussed with Neuro attending Dr. Meek.      Assessment and Plan :     Assessment:   58 R hand dominant Male w/PMH of asthma and GERD presented to ED w/ R sided weakness/[id number] on the day of admission. CTH negative for any acute intracranial pathology, CTA H/N w/no LVO or HGS and a negative CTP. Admitted to [facility] for further workup. 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     Impression : Acute L medullary infarct in MRI likely could be 2/2 small vessel disease i/s/o vascular risk factors, TTE W/PFO w/negative B/L LE doppler screen could be an incidental finding.       Reccs:     - MRI : Acute L medullary infarct  - TTE: PFO+, CARLOS ALBERTO confirms PFO, No LA/CORINE thrombus.   - B/L LE negative  - C/W ASA 325mg po daily (long term/life long) i/s/o of PFO.  - On HIS - Atorva 80mg po QHS, Titrate to LDL < 70mg/dl.  - Cards Cxed and reccs appreciated.   - Pending ILR placement tomorrow per STROKE AF Study.   - Stroke labs :   HbA1C: 6.4- DM management per primary team.   LDL: 101  - Will need Outpt Neuro F/U in 2 weeks upon D/C.   - Outpt Ophthal and Dental eval.  - PT/OT eval and follow up.   - Further care per primary team.       Discussed with Neuro attending Dr. Meek.

## 2024-10-23 NOTE — PROGRESS NOTE ADULT - ATTENDING COMMENTS
59 y/o Armenian speaking, male from home w/ PMH of asthma and GERD presents to the ER w/ c/o Rt sided weakness and numbness that started at 7am this morning. His weakness is persistent and has not improved. He reports that he had Rt arm cramp a week ago which resolved. He denies any prior hx of CVA, TIA. Pt denies chest pain, palpitations, sob, or other complaints. Admitted for CVA w/u    #Acute left medullary infarct   #Corneal abrasian  #PFO  #Hyperlipidemia  #Hypertriglyceridemia   #Asthma  #GERD  #DVT ppx   Presenting with RT sided weakness, left facial droop. CT head, CT perfusion, CT Angio head and neck- unremarkable except mild to moderate intracranial stenosis. MR head showed-ACUTE LEFT MEDULLARY INFARCT  -No events on tele so far.   -ECHO w/ PFO.  Check US duplex to r/o DVT.   - CARLOS ALBERTO performed - negative for thrombus or etiology for embolism   - Plan for loop recorder with EP 10/24 Dr. Rain  - Corneal abrasion - artifical tears - outpatient optho follow up  -D/w neuro, change to asa 325 and statin   -Hba1C 6.4%, Lipid profile noted   -PT consult- home PT  -SnS eval pending   -c/w  inhalers, PPI  -C/w  Lovenox SC   -D/w daughter at bedside 57 y/o Urdu speaking, male from home w/ PMH of asthma and GERD presents to the ER w/ c/o Rt sided weakness and numbness that started at 7am this morning. His weakness is persistent and has not improved. He reports that he had Rt arm cramp a week ago which resolved. He denies any prior hx of CVA, TIA. Pt denies chest pain, palpitations, sob, or other complaints. Admitted for CVA w/u    #Acute left medullary infarct   #PFO  #Hyperlipidemia  #Hypertriglyceridemia   #Asthma  #GERD  #DVT ppx   Presenting with RT sided weakness, left facial droop. CT head, CT perfusion, CT Angio head and neck- unremarkable except mild to moderate intracranial stenosis. MR head showed-ACUTE LEFT MEDULLARY INFARCT  -No events on tele so far.   -ECHO w/ PFO.  Check US duplex to r/o DVT.   - CARLOS ALBERTO performed - negative for thrombus or etiology for embolism   - Plan for loop recorder with EP 10/24 Dr. Rain  -D/w neuro, change to asa 325 and statin   -Hba1C 6.4%, Lipid profile noted   -PT consult- home PT  -SnS eval pending   -c/w  inhalers, PPI  -C/w  Lovenox SC   -D/w daughter at bedside

## 2024-10-23 NOTE — PROGRESS NOTE ADULT - NS ATTEND AMEND GEN_ALL_CORE FT
58 year old M with HLD, preDM who was found to have acute L medullary CVA, incidentally found to have positive PFO.    1) L Medullary CVA  2) HLD, pre-DM  - EKG showed sinus rhythm without significant abnormalities. Thus far, telemetry has been sinus rhythm  - As discussed with neurology Dr. Wetzel, concern for cardioembolic source of stroke  - LE duplex negative for DVT. CARLOS ALBERTO 10/22/24 showed no LA thrombus, +PFO with ASA  - EP consulted for ILR placement, planning for Thursday  - After prolonged period of time of rhythm monitoring, pt should see outpatient interventional cardiologist for consideration of PFO closure  - Continue anti-platelets and statin as per neurology
58 year old M with HLD, preDM who was found to have acute L medullary CVA, incidentally found to have positive PFO.    1) L Medullary CVA, concern for cardioembolic source of stroke as discussed with neurology  2) HLD, pre-DM  - EKG showed sinus rhythm without significant abnormalities. Thus far, telemetry has been sinus rhythm  - LE duplex negative for DVT. CARLOS ALBERTO 10/22/24 showed no LA thrombus, +PFO with ASA  - EP consulted for ILR placement, planning for Thursday by Dr. Rain  - After at least 4-6 weeks of ILR monitoring, pt should see outpatient interventional cardiologist Dr. Elaine Wetzel for consideration of PFO closure  - Continue high dose statin   - Patient is on ASA 325mg once daily as per neurology recs

## 2024-10-23 NOTE — DISCHARGE NOTE PROVIDER - CARE PROVIDERS DIRECT ADDRESSES
,DirectAddress_Unknown,DirectAddress_Unknown,[email or web address]

## 2024-10-23 NOTE — DISCHARGE NOTE PROVIDER - NSDCCPCAREPLAN_GEN_ALL_CORE_FT
PRINCIPAL DISCHARGE DIAGNOSIS  Diagnosis: CVA (cerebrovascular accident)  Assessment and Plan of Treatment:       SECONDARY DISCHARGE DIAGNOSES  Diagnosis: Cornea abrasion  Assessment and Plan of Treatment:     Diagnosis: PFO (patent foramen ovale)  Assessment and Plan of Treatment:      PRINCIPAL DISCHARGE DIAGNOSIS  Diagnosis: CVA (cerebrovascular accident)  Assessment and Plan of Treatment: You had a stroke, causing your right sided weakness. Your heart ultrasound showed a PFO, which is an abnormal openining in the heart and could have caused your stroke by a blood clot. It is important to follow up with your cardiologist (electrophysiologist) with your loop recorder to further evaluate for arrhythmia. Please see them and within 1 week. Please also see your neurologist within 1 week. Please take your aspirin, clopidogrel, and atorvastatin.      SECONDARY DISCHARGE DIAGNOSES  Diagnosis: Cornea abrasion  Assessment and Plan of Treatment: Please use eye drops and follow up with opthalmologist within 1 week.    Diagnosis: PFO (patent foramen ovale)  Assessment and Plan of Treatment: Please follow the directions above.     PRINCIPAL DISCHARGE DIAGNOSIS  Diagnosis: CVA (cerebrovascular accident)  Assessment and Plan of Treatment: You had a stroke, causing your right sided weakness. Your heart ultrasound showed a PFO, which is an abnormal openining in the heart and could have caused your stroke by a blood clot. It is important to follow up with your cardiologist (electrophysiologist) with your loop recorder to further evaluate for arrhythmia. Please see them and within 1 week. Please also see your neurologist within 1 week. Please take your aspirin.      SECONDARY DISCHARGE DIAGNOSES  Diagnosis: Cornea abrasion  Assessment and Plan of Treatment: Please use eye drops and follow up with opthalmologist within 1 week.    Diagnosis: PFO (patent foramen ovale)  Assessment and Plan of Treatment: Please follow the directions above.     PRINCIPAL DISCHARGE DIAGNOSIS  Diagnosis: CVA (cerebrovascular accident)  Assessment and Plan of Treatment: You presented with right sided weakness. You were found to have a stroke, causing your right sided weakness. Your heart ultrasound showed a PFO, which is an abnormal openining in the heart and could have caused your stroke by allowing blood clot to move from right sght of the body to the left. Your legs were checked for clot and the test was negative. It is important to follow up with your cardiologist (electrophysiologist) with your loop recorder to further evaluate for arrhythmia. Please see them and within 1 week. Please also see your neurologist within 1 week. Please take your aspirin 325mg as prescribed. Please also continue taking atorvastatin as prescribed.      SECONDARY DISCHARGE DIAGNOSES  Diagnosis: Cornea abrasion  Assessment and Plan of Treatment: Please use eye drops and follow up with opthalmologist within 1 week.    Diagnosis: PFO (patent foramen ovale)  Assessment and Plan of Treatment: Please follow the directions above.    Diagnosis: HLD (hyperlipidemia)  Assessment and Plan of Treatment: You have high cholesterol or elevated triglycerides. Please continue to take your meds as prescribed. Eat a diet that is low in bad fats, and low in added sugars. If you are physically able to, please exercise at least 3 times a week. Elevated fats in the blood are associated with increased risk of many health complications, including heart attacks and stroke. Please follow up with your primary care doctor.

## 2024-10-23 NOTE — PROGRESS NOTE ADULT - PROBLEM SELECTOR PLAN 1
s/p CARLOS ALBERTO- negative for LA thrombus, concern for cardioembolic source of stroke  -EP eval for ILR placement initiated, placement on Thursday  - secondary stroke prevention  -Will need outpatient  PFO closure work up once discharged

## 2024-10-23 NOTE — DISCHARGE NOTE PROVIDER - CARE PROVIDER_API CALL
Vineet Poole  Cardiovascular Disease  [address] [address] [place] [address]  Phone: [phone]  Fax: [phone]  Follow Up Time:     Demetri Rain  Cardiac Electrophysiology  [address] [address] [place] [address], [address]  [place] [address]  Phone: [phone]  Fax: [phone]  Follow Up Time:     Zain Wetzel  Neurology  [address] [facility] [address]  [place] [address]  Phone: [phone]  Fax: [phone]  Follow Up Time:

## 2024-10-23 NOTE — PROGRESS NOTE ADULT - SUBJECTIVE AND OBJECTIVE BOX
Neurology Stroke Progress Note    INTERVAL HPI/OVERNIGHT EVENTS:  Patient seen and examined @ bedside with family at bedside. [place]ican peaking, offered  but pt and family requested pts daughter who is at bedside to be the . States he had L sided HA and L teeth discomfort with L eye pain yesterday which has since improved except for some mild L teeth discomfort. Pt had similar complaints over the weekend with relief reported with tylenol.  pending ILR placement tomorrow.     MEDICATIONS  (STANDING):  artificial  tears Solution 2 Drop(s) Left EYE three times a day  aspirin  chewable 324 milliGRAM(s) Oral daily  atorvastatin 80 milliGRAM(s) Oral at bedtime  enoxaparin Injectable 40 milliGRAM(s) SubCutaneous every 24 hours  pantoprazole    Tablet 40 milliGRAM(s) Oral before breakfast  polyethylene glycol 3350 17 Gram(s) Oral daily    MEDICATIONS  (PRN):  acetaminophen     Tablet .. 650 milliGRAM(s) Oral every 6 hours PRN Temp greater or equal to 38C (100.4F), Mild Pain (1 - 3), Moderate Pain (4 - 6)      Allergies  penicillin (Hives; Rash)      Vital Signs Last 24 Hrs  T(C): 36.4 (10-23-24 @ 15:55), Max: 36.9 (10-22-24 @ 23:58)  T(F): 97.5 (10-23-24 @ 15:55), Max: 98.5 (10-22-24 @ 23:58)  HR: 89 (10-23-24 @ 15:55) (78 - 92)  BP: 137/87 (10-23-24 @ 15:55) (110/90 - 137/87)  BP(mean): 101 (10-23-24 @ 10:35) (98 - 101)  RR: 18 (10-23-24 @ 15:55) (18 - 19)  SpO2: 97% (10-23-24 @ 15:55) (93% - 98%)    Parameters below as of 21 Oct 2024 11:45  Patient On (Oxygen Delivery Method): room air        Physical exam:  General: No acute distress, awake and alert  Eyes: Anicteric sclerae, moist conjunctivae, see below for CNs  Neck: trachea midline, FROM, supple.  Cardiovascular: Regular rate and rhythm, S1S2+.   Pulmonary: Anterior breath sounds clear bilaterally, no crackles or wheezing. No use of accessory muscles      Neurologic:  -Mental status: Awake, alert, oriented to person, place, and time. Speech is fluent with intact naming, repetition, and comprehension, Subtle dysarthria+. Recent and remote memory intact. Follows commands. Attention/concentration intact. Fund of knowledge appropriate.    -Cranial nerves:   II: Visual fields are full to confrontation.  III, IV, VI: Extraocular movements are intact without nystagmus. Asymmetric pupils R 6-->5, L 4-->3. L Ptosis+(L partial Horners).  V:  Facial sensation V1-V3 decreased on R side.   VII: Face - very subtle improved L NLFF>   VIII: Hearing is bilaterally intact to finger rub  IX, X: Uvula is midline and soft palate rises symmetrically  XI: Head turning and shoulder shrug are intact.  XII: Tongue protrudes midline  Motor: Normal bulk and tone. RUE and RLE: Briefly antigravity, atleast 3/5 drifts adn hits the bed, , when tested individually RUE Deltoid, Biceps and Triceps : 2+/5. R Hip flexion, Knee flexion and extension 2/5 when tested individually. RLE atleast antigravity 3/5. R ataxic lashon.   Rapid alternating movements intact and symmetric.  Sensation: Severe R sided sensory deficit that pt does not even recognise being touched appears as R sided neglect on DSST.   Coordination: No dysmetria on finger-to-nose and heel-to-shin bilaterally on RUE/RLE out of proportion to weakness.   Reflexes: B/L downgoing toes.  Gait: Deferred.    NIHSS: 8  mRS: 0    LABS:                          15.7   5.59  )-----------( 249      ( 23 Oct 2024 06:53 )             46.1     10-23    140  |  109[H]  |  19[H]  ----------------------------<  108[H]  4.0   |  25  |  1.00    Ca    8.8      23 Oct 2024 06:53  Phos  3.1     10-23  Mg     2.3     10-23          RADIOLOGY & ADDITIONAL TESTS:    CT Brain Stroke Protocol (10.18.24 @ 12:51)   IMPRESSION:    1. BRAIN:  No intracranial hemorrhage is appreciated.  No intracranial   mass lesion is appreciated.    2.  RIGHT CAROTID SYSTEM:    No hemodynamically significant stenosis.    3.   LEFT CAROTID SYSTEM:     No hemodynamically significant stenosis.    4.   VERTEBRAL CIRCULATION:    Patent.    5.  ANTERIOR INTRACRANIAL CIRCULATION:     Intracranial atherosclerosis   cavernous and clinoid segments of the internal carotid arteries, mild.    6.  POSTERIOR INTRACRANIAL CIRCULATION:    Intracranial atherosclerosis   left vertebral artery, mild to moderate.    7.  No large vessel occlusion    8.  BRAIN PERFUSION:   No acute infarction of the brain is convincingly   demonstrated.      MR Head w/ IV Cont (10.20.24 @ 11:44)   IMPRESSION: ACUTE LEFT MEDULLARY INFARCT. NO ACUTE INTRACRANIAL   HEMORRHAGE.    TTE W or WO Ultrasound Enhancing Agent (10.21.24 @ 09:23)   CONCLUSIONS:      1. Left ventricular cavity is small. Left ventricular wall thickness is mildly increased. Left ventricular systolic function is hyperdynamic with an ejection fraction of 74 % by Otero's method of disks.   2. Near complete mid cavity obliteration without significant gradient.   3. There is mild (grade 1) left ventricular diastolic dysfunction.   4. Normal right ventricular cavity size and normal right ventricular systolic function.   5. Trileaflet aortic valve. Fibrocalcific aortic valve sclerosis without stenosis.   6. Structurally normal mitral valve with normal leaflet excursion.   7. No mitral regurgitation.   8. Agitated saline injection reveals bubbles in the left heart, consistent with a patent foramen ovale.   9. No pericardial effusion seen.  10. No prior echocardiogram is available for comparison.    CARLOS ALBERTO W or WO Ultrasound Enhancing Agent (10.22.24 @ 10:58)   CONCLUSIONS:      1. No cardiac source of embolism was identified.   2. No evidence of left atrial or left atrial appendage thrombus.   3. Left ventricular systolic function is normal with an ejection fraction visually estimated at 55 to 60 %.   4. Trace aortic regurgitation.   5. Agitated saline injection reveals bubbles in the left heart, consistent with a patent foramen ovale.   6. Interatrial septum is aneurysmal.   7. No pericardial effusion seen.   8. Compared to the transthoracic echocardiogram performed on 10/21/2024, a CARLOS ALBERTO was performed and additional information provided.      US Duplex Venous Lower Ext Complete, Bilateral (10.22.24 @ 14:44)   MPRESSION:  No evidence of deep venous thrombosis in either lower extremity.

## 2024-10-23 NOTE — DISCHARGE NOTE PROVIDER - NSDCFUSCHEDAPPT_GEN_ALL_CORE_FT
Demetri Rain  [facility]  ELECTROPH [id number]  Scheduled Appointment: 10/30/2024    Vineet Poole  [facility]  CARDIOLOGY [address]  Scheduled Appointment: 10/30/2024    [facility]  NEUROLOGY [address] [place] [address]  Scheduled Appointment: 11/06/2024    [facility]  NEUROLOGY [address] [place] [address]  Scheduled Appointment: 11/06/2024     Demetri Rain  [facility]  ELECTROPH [address]  Scheduled Appointment: 10/30/2024    Vineet Poole  [facility]  CARDIOLOGY [address]  Scheduled Appointment: 10/30/2024    [facility]  NEUROLOGY [address] [facility]  Scheduled Appointment: 11/06/2024    [facility]  NEUROLOGY [address] [place] [address]  Scheduled Appointment: 11/06/2024    Maddy Chiu  [facility]  NEUROLOGY [address] [place] [address]  Scheduled Appointment: 11/07/2024

## 2024-10-23 NOTE — DISCHARGE NOTE PROVIDER - PROVIDER TOKENS
PROVIDER:[TOKEN:[[id number]]],PROVIDER:[TOKEN:[[id number]]],PROVIDER:[TOKEN:[[id number]]]

## 2024-10-23 NOTE — DISCHARGE NOTE PROVIDER - NSDCCAREPROVSEEN_GEN_ALL_CORE_FT
Thuy, Jose Awad Thuy, Brisa Shearer, Jose Poole, Vineet Wetzel, Zain Pate, Adelita Meek, Amy Rain, Demetri

## 2024-10-24 ENCOUNTER — TRANSCRIPTION ENCOUNTER (OUTPATIENT)
Age: 59
End: 2024-10-24

## 2024-10-24 VITALS
HEART RATE: 76 BPM | TEMPERATURE: 98 F | SYSTOLIC BLOOD PRESSURE: 138 MMHG | OXYGEN SATURATION: 98 % | RESPIRATION RATE: 18 BRPM | DIASTOLIC BLOOD PRESSURE: 76 MMHG

## 2024-10-24 LAB
ANION GAP SERPL CALC-SCNC: 9 MMOL/L — SIGNIFICANT CHANGE UP (ref 5–17)
BUN SERPL-MCNC: 19 MG/DL — HIGH (ref 7–18)
CALCIUM SERPL-MCNC: 8.8 MG/DL — SIGNIFICANT CHANGE UP (ref 8.4–10.5)
CHLORIDE SERPL-SCNC: 109 MMOL/L — HIGH (ref 96–108)
CO2 SERPL-SCNC: 21 MMOL/L — LOW (ref 22–31)
CREAT SERPL-MCNC: 0.96 MG/DL — SIGNIFICANT CHANGE UP (ref 0.5–1.3)
EGFR: 90 ML/MIN/1.73M2 — SIGNIFICANT CHANGE UP
GLUCOSE SERPL-MCNC: 108 MG/DL — HIGH (ref 70–99)
HCT VFR BLD CALC: 47 % — SIGNIFICANT CHANGE UP (ref 39–50)
HGB BLD-MCNC: 16.2 G/DL — SIGNIFICANT CHANGE UP (ref 13–17)
MAGNESIUM SERPL-MCNC: 2.1 MG/DL — SIGNIFICANT CHANGE UP (ref 1.6–2.6)
MCHC RBC-ENTMCNC: 32.9 PG — SIGNIFICANT CHANGE UP (ref 27–34)
MCHC RBC-ENTMCNC: 34.5 GM/DL — SIGNIFICANT CHANGE UP (ref 32–36)
MCV RBC AUTO: 95.3 FL — SIGNIFICANT CHANGE UP (ref 80–100)
NRBC # BLD: 0 /100 WBCS — SIGNIFICANT CHANGE UP (ref 0–0)
PHOSPHATE SERPL-MCNC: 3.4 MG/DL — SIGNIFICANT CHANGE UP (ref 2.5–4.5)
PLATELET # BLD AUTO: 258 K/UL — SIGNIFICANT CHANGE UP (ref 150–400)
POTASSIUM SERPL-MCNC: 4.1 MMOL/L — SIGNIFICANT CHANGE UP (ref 3.5–5.3)
POTASSIUM SERPL-SCNC: 4.1 MMOL/L — SIGNIFICANT CHANGE UP (ref 3.5–5.3)
RBC # BLD: 4.93 M/UL — SIGNIFICANT CHANGE UP (ref 4.2–5.8)
RBC # FLD: 12.4 % — SIGNIFICANT CHANGE UP (ref 10.3–14.5)
SODIUM SERPL-SCNC: 139 MMOL/L — SIGNIFICANT CHANGE UP (ref 135–145)
WBC # BLD: 5.4 K/UL — SIGNIFICANT CHANGE UP (ref 3.8–10.5)
WBC # FLD AUTO: 5.4 K/UL — SIGNIFICANT CHANGE UP (ref 3.8–10.5)

## 2024-10-24 PROCEDURE — 93923 UPR/LXTR ART STDY 3+ LVLS: CPT

## 2024-10-24 PROCEDURE — 70496 CT ANGIOGRAPHY HEAD: CPT | Mod: MC

## 2024-10-24 PROCEDURE — 97162 PT EVAL MOD COMPLEX 30 MIN: CPT

## 2024-10-24 PROCEDURE — 85730 THROMBOPLASTIN TIME PARTIAL: CPT

## 2024-10-24 PROCEDURE — 97110 THERAPEUTIC EXERCISES: CPT

## 2024-10-24 PROCEDURE — 0042T: CPT | Mod: MC

## 2024-10-24 PROCEDURE — 70552 MRI BRAIN STEM W/DYE: CPT | Mod: MC

## 2024-10-24 PROCEDURE — 85027 COMPLETE CBC AUTOMATED: CPT

## 2024-10-24 PROCEDURE — 84484 ASSAY OF TROPONIN QUANT: CPT

## 2024-10-24 PROCEDURE — 99285 EMERGENCY DEPT VISIT HI MDM: CPT | Mod: 25

## 2024-10-24 PROCEDURE — 80053 COMPREHEN METABOLIC PANEL: CPT

## 2024-10-24 PROCEDURE — 70498 CT ANGIOGRAPHY NECK: CPT | Mod: MC

## 2024-10-24 PROCEDURE — 83735 ASSAY OF MAGNESIUM: CPT

## 2024-10-24 PROCEDURE — 96374 THER/PROPH/DIAG INJ IV PUSH: CPT

## 2024-10-24 PROCEDURE — 36415 COLL VENOUS BLD VENIPUNCTURE: CPT

## 2024-10-24 PROCEDURE — 93970 EXTREMITY STUDY: CPT

## 2024-10-24 PROCEDURE — 99233 SBSQ HOSP IP/OBS HIGH 50: CPT

## 2024-10-24 PROCEDURE — 93320 DOPPLER ECHO COMPLETE: CPT

## 2024-10-24 PROCEDURE — A9585: CPT

## 2024-10-24 PROCEDURE — 80061 LIPID PANEL: CPT

## 2024-10-24 PROCEDURE — 93005 ELECTROCARDIOGRAM TRACING: CPT

## 2024-10-24 PROCEDURE — 70450 CT HEAD/BRAIN W/O DYE: CPT | Mod: MC

## 2024-10-24 PROCEDURE — 97116 GAIT TRAINING THERAPY: CPT

## 2024-10-24 PROCEDURE — 93325 DOPPLER ECHO COLOR FLOW MAPG: CPT

## 2024-10-24 PROCEDURE — 80048 BASIC METABOLIC PNL TOTAL CA: CPT

## 2024-10-24 PROCEDURE — 85610 PROTHROMBIN TIME: CPT

## 2024-10-24 PROCEDURE — 82962 GLUCOSE BLOOD TEST: CPT

## 2024-10-24 PROCEDURE — 83036 HEMOGLOBIN GLYCOSYLATED A1C: CPT

## 2024-10-24 PROCEDURE — 96372 THER/PROPH/DIAG INJ SC/IM: CPT | Mod: XU

## 2024-10-24 PROCEDURE — 93306 TTE W/DOPPLER COMPLETE: CPT

## 2024-10-24 PROCEDURE — C1764: CPT

## 2024-10-24 PROCEDURE — 99239 HOSP IP/OBS DSCHRG MGMT >30: CPT | Mod: GC

## 2024-10-24 PROCEDURE — 96375 TX/PRO/DX INJ NEW DRUG ADDON: CPT

## 2024-10-24 PROCEDURE — 84100 ASSAY OF PHOSPHORUS: CPT

## 2024-10-24 PROCEDURE — 93312 ECHO TRANSESOPHAGEAL: CPT

## 2024-10-24 PROCEDURE — 85025 COMPLETE CBC W/AUTO DIFF WBC: CPT

## 2024-10-24 PROCEDURE — 92610 EVALUATE SWALLOWING FUNCTION: CPT

## 2024-10-24 RX ORDER — ASPIRIN/MAG CARB/ALUMINUM AMIN 325 MG
1 TABLET ORAL
Qty: 30 | Refills: 0
Start: 2024-10-24 | End: 2024-11-22

## 2024-10-24 RX ORDER — ASPIRIN/MAG CARB/ALUMINUM AMIN 325 MG
1 TABLET ORAL
Qty: 90 | Refills: 0
Start: 2024-10-24

## 2024-10-24 RX ADMIN — Medication 324 MILLIGRAM(S): at 11:59

## 2024-10-24 RX ADMIN — Medication 10 MILLILITER(S): at 09:04

## 2024-10-24 RX ADMIN — PANTOPRAZOLE SODIUM 40 MILLIGRAM(S): 40 TABLET, DELAYED RELEASE ORAL at 06:04

## 2024-10-24 RX ADMIN — Medication 2 DROP(S): at 06:04

## 2024-10-24 RX ADMIN — POLYETHYLENE GLYCOL 3350 17 GRAM(S): 17 POWDER, FOR SOLUTION ORAL at 11:59

## 2024-10-24 NOTE — PROGRESS NOTE ADULT - PROBLEM SELECTOR PLAN 6
Left eye redness and heaviness  Continue artificial tears
Left eye redness and heaviness  Continue artificial tears

## 2024-10-24 NOTE — PROGRESS NOTE ADULT - SUBJECTIVE AND OBJECTIVE BOX
Overnight Events: nik DODSON reports more feeling in his arm and face.    Telemetry: sinus    Review Of Systems: No chest pain, shortness of breath, or palpitations  CONSTITUTIONAL: no fevers or chills  EYES/ENT: No visual changes;  No vertigo or throat pain   NECK: No pain or stiffness  RESPIRATORY: No cough, wheezing. No shortness of breath  CARDIOVASCULAR: No chest pain or palpitations  GASTROINTESTINAL: No abdominal or epigastric pain. No nausea, vomiting. No diarrhea. No melena.  GENITOURINARY: No dysuria, frequency or hematuria  NEUROLOGICAL: + weakness  SKIN: No itching, burning, rashes, or lesions   All other review of systems is negative unless indicated above.     Current Meds:  acetaminophen     Tablet .. 650 milliGRAM(s) Oral every 6 hours PRN  artificial  tears Solution 2 Drop(s) Left EYE three times a day  aspirin  chewable 324 milliGRAM(s) Oral daily  atorvastatin 80 milliGRAM(s) Oral at bedtime  lidocaine 1% Injectable 10 milliLiter(s) Local Injection once  pantoprazole    Tablet 40 milliGRAM(s) Oral before breakfast  polyethylene glycol 3350 17 Gram(s) Oral daily    Vitals:  T(F): 97.5 (10-24), Max: 99 (10-23)  HR: 74 (10-24) (74 - 89)  BP: 119/90 (10-24) (116/85 - 137/87)  RR: 18 (10-24)  SpO2: 97% (10-24)  I&O's Summary    23 Oct 2024 07:01  -  24 Oct 2024 07:00  --------------------------------------------------------  IN: 450 mL / OUT: 1050 mL / NET: -600 mL    Physical Exam:  General: No acute distress  Neck: No JVD  Lungs: Clear to auscultation bilaterally; No rales or wheezing  Heart: Regular rate and rhythm; No murmurs, rubs, or gallops  Abdomen: Soft, non tender, non distended   Extremities: Warm, no edema   Nervous system:  Alert & Oriented X3, rt. upper extr. weakness  Psychiatric: Normal affect  Skin: No rashes or lesions                        16.2   5.40  )-----------( 258      ( 24 Oct 2024 06:24 )             47.0     10-24    139  |  109[H]  |  19[H]  ----------------------------<  108[H]  4.1   |  21[L]  |  0.96    Ca    8.8      24 Oct 2024 06:24  Phos  3.4     10-24  Mg     2.1     10-24    CARLOS ALBERTO 10/22/24 - CONCLUSIONS:      1. No cardiac source of embolism was identified.   2. No evidence of left atrial or left atrial appendage thrombus.   3. Left ventricular systolic function is normal with an ejection fraction visually estimated at 55 to 60 %.   4. Trace aortic regurgitation.   5. Agitated saline injection reveals bubbles in the left heart, consistent with a patent foramen ovale.   6. Interatrial septum is aneurysmal.   7. No pericardial effusion seen.   8. Compared to the transthoracic echocardiogram performed on 10/21/2024, a CARLOS ALBERTO was performed and additional information provided.    TTE 10/21/24 -     CONCLUSIONS:      1. Left ventricular cavity is small. Left ventricular wall thickness is mildly increased. Left ventricularsystolic function is hyperdynamic with an ejection fraction of 74 % by Otero's method of disks.   2. Near complete mid cavity obliteration without significant gradient.   3. There is mild (grade 1) left ventricular diastolic dysfunction.   4. Normal right ventricular cavity size and normal right ventricular systolic function.   5. Trileaflet aortic valve. Fibrocalcific aortic valve sclerosis without stenosis.   6. Structurally normal mitral valve with normal leaflet excursion.   7. No mitral regurgitation.   8. Agitated saline injection reveals bubbles in the left heart, consistent with a patent foramen ovale.   9. No pericardial effusion seen.  10. No prior echocardiogram is available for comparison.

## 2024-10-24 NOTE — PROGRESS NOTE ADULT - TIME BILLING
review of EMR , coordination of care, discussion with family and patient, and communication with primary team
review of EMR , coordination of care, discussion with family and patient, and communication with primary team

## 2024-10-24 NOTE — PROGRESS NOTE ADULT - PROBLEM SELECTOR PLAN 1
P/w Left  sided facial droop and rigth sided weakness   MRI brain: acute left medullary infarct  TTE: PFO  - per Neuro:  ASA 325mg po daily (long term)  - cont statin therapy  Neurology Dr. Wetzel following  Cardiology Dr Poole following  BLE dopper negative for DVT  - CARLOS ALBERTO negative  - S/P loop recorder  - DC today, f/u neuro and cardiology

## 2024-10-24 NOTE — DISCHARGE NOTE NURSING/CASE MANAGEMENT/SOCIAL WORK - PATIENT PORTAL LINK FT
You can access the FollowMyHealth Patient Portal offered by [facility] by registering at the following website: [email or web address]. By joining [facility]’s FollowMyHealth portal, you will also be able to view your health information using other applications (apps) compatible with our system.

## 2024-10-24 NOTE — DISCHARGE NOTE NURSING/CASE MANAGEMENT/SOCIAL WORK - NSDCFUADDAPPT_GEN_ALL_CORE_FT
APPTS ARE READY TO BE MADE: [X] YES    Best Family or Patient Contact (if needed):    Additional Information about above appointments (if needed):    1: PCP  2: Opthalmologist  3: Cardiologist and Electrophysiologist  4. Neurologist    Other comments or requests:

## 2024-10-24 NOTE — PROGRESS NOTE ADULT - PROBLEM SELECTOR PLAN 2
hx of asthma not on any medication.
-continue Atorvastatin 80 mg PO QHS.
-continue Atorvastatin 80 mg PO QHS.
hx of asthma not on any medication.

## 2024-10-24 NOTE — PROGRESS NOTE ADULT - ASSESSMENT
Assessment and Recommendations:   58 year old M with HLD, preDM who was found to have acute L medullary CVA, incidentally found to have positive PFO.    1) L Medullary CVA, concern for cardioembolic source of stroke as discussed with neurology  2) HLD, pre-DM  - EKG showed sinus rhythm without significant abnormalities. Telemetry remains sinus rhythm.  - LE duplex negative for DVT. CARLOS ALBERTO 10/22/24 showed no LA thrombus, +PFO with ASA  - Continue atorvastatin 80mg daily  - Patient is on ASA 325mg once daily as per neurology recs, defer to them on dosing.  - He is s/p ILR placement by EP Dr. Rain on 10/24/24  - After at least 4-6 weeks of ILR monitoring, pt should see outpatient interventional cardiologist Dr. Elaine Wetzel for consideration of PFO closure. Otherwise, pt can f/u with me 1-2 weeks post discharge.    Vineet Poole MD ([employer] TEAMS message PREFERRED)  Cardiology Attending  [facility] - Cardiology    All Cardiology service information can be found 24/7 on [email or web address], password: [email or web address]

## 2024-10-24 NOTE — DISCHARGE NOTE NURSING/CASE MANAGEMENT/SOCIAL WORK - FINANCIAL ASSISTANCE
[facility] provides services at a reduced cost to those who are determined to be eligible through [facility]’s financial assistance program. Information regarding [facility]’s financial assistance program can be found by going to [email or web address] or by calling [phone].

## 2024-10-24 NOTE — PROGRESS NOTE ADULT - SUBJECTIVE AND OBJECTIVE BOX
PGY-1 Progress Note discussed with attending      PLEASE CONTACT ON CALL TEAM:  - On Call Team (Please refer to [facility]) FROM 5:00 PM - 8:30PM  - Nightfloat Team FROM 8:30 -7:30 AM    INTERVAL HPI/OVERNIGHT EVENTS:     No acute overnight events. Pt evaluated at [facility]. Pt has no new complaints. Reports continued left eye heaviness.    MEDICATIONS  (STANDING):  artificial  tears Solution 2 Drop(s) Left EYE three times a day  aspirin  chewable 324 milliGRAM(s) Oral daily  atorvastatin 80 milliGRAM(s) Oral at bedtime  pantoprazole    Tablet 40 milliGRAM(s) Oral before breakfast  polyethylene glycol 3350 17 Gram(s) Oral daily    MEDICATIONS  (PRN):  acetaminophen     Tablet .. 650 milliGRAM(s) Oral every 6 hours PRN Temp greater or equal to 38C (100.4F), Mild Pain (1 - 3), Moderate Pain (4 - 6)      Vital Signs Last 24 Hrs  T(C): 36.7 (24 Oct 2024 15:30), Max: 37.2 (23 Oct 2024 23:05)  T(F): 98 (24 Oct 2024 15:30), Max: 99 (23 Oct 2024 23:05)  HR: 76 (24 Oct 2024 15:30) (74 - 93)  BP: 138/76 (24 Oct 2024 15:30) (116/85 - 138/95)  BP(mean): 110 (24 Oct 2024 11:35) (97 - 110)  RR: 18 (24 Oct 2024 15:30) (18 - 18)  SpO2: 98% (24 Oct 2024 15:30) (95% - 98%)    Parameters below as of 24 Oct 2024 15:30  Patient On (Oxygen Delivery Method): room air        PHYSICAL EXAMINATION:  GENERAL: NAD  HEAD:  Atraumatic, Normocephalic  EYES:  conjunctiva and sclera clear  NECK: Supple  CHEST/LUNG: Clear to auscultation  HEART: Regular rate and rhythm; No murmurs, rubs, or gallops  ABDOMEN: Soft, Nontender, Bowel sounds present, no masses on palpation  NERVOUS SYSTEM:  Alert and oriented x 3. Right arm and leg with 3/5 strength.   EXTREMITIES:  No BLE edema  SKIN: warm, dry                          16.2   5.40  )-----------( 258      ( 24 Oct 2024 06:24 )             47.0     10-24    139  |  109[H]  |  19[H]  ----------------------------<  108[H]  4.1   |  21[L]  |  0.96    Ca    8.8      24 Oct 2024 06:24  Phos  3.4     10-24  Mg     2.1     10-24                I&O's Summary    23 Oct 2024 07:01  -  24 Oct 2024 07:00  --------------------------------------------------------  IN: 450 mL / OUT: 1050 mL / NET: -600 mL

## 2024-10-24 NOTE — PROGRESS NOTE ADULT - PROVIDER SPECIALTY LIST ADULT
Internal Medicine
Internal Medicine
Neurology
Cardiology
Cardiology
Internal Medicine
Internal Medicine
Neurology
Cardiology
Internal Medicine
Internal Medicine

## 2024-10-25 ENCOUNTER — NON-APPOINTMENT (OUTPATIENT)
Age: 59
End: 2024-10-25

## 2024-10-30 ENCOUNTER — APPOINTMENT (OUTPATIENT)
Dept: CARDIOLOGY | Facility: CLINIC | Age: 59
End: 2024-10-30
Payer: MEDICAID

## 2024-10-30 ENCOUNTER — NON-APPOINTMENT (OUTPATIENT)
Age: 59
End: 2024-10-30

## 2024-10-30 ENCOUNTER — APPOINTMENT (OUTPATIENT)
Dept: ELECTROPHYSIOLOGY | Facility: CLINIC | Age: 59
End: 2024-10-30
Payer: MEDICAID

## 2024-10-30 VITALS
BODY MASS INDEX: 27.12 KG/M2 | DIASTOLIC BLOOD PRESSURE: 78 MMHG | WEIGHT: 158 LBS | SYSTOLIC BLOOD PRESSURE: 122 MMHG | HEART RATE: 105 BPM | OXYGEN SATURATION: 96 %

## 2024-10-30 VITALS
RESPIRATION RATE: 16 BRPM | HEART RATE: 104 BPM | SYSTOLIC BLOOD PRESSURE: 102 MMHG | BODY MASS INDEX: 26.98 KG/M2 | HEIGHT: 64 IN | DIASTOLIC BLOOD PRESSURE: 73 MMHG | WEIGHT: 158 LBS | OXYGEN SATURATION: 99 %

## 2024-10-30 DIAGNOSIS — Z86.73 PERSONAL HISTORY OF TRANSIENT ISCHEMIC ATTACK (TIA), AND CEREBRAL INFARCTION W/OUT RESIDUAL DEFICITS: ICD-10-CM

## 2024-10-30 DIAGNOSIS — Q21.12 PATENT FORAMEN OVALE: ICD-10-CM

## 2024-10-30 DIAGNOSIS — Z95.818 PRESENCE OF OTHER CARDIAC IMPLANTS AND GRAFTS: ICD-10-CM

## 2024-10-30 DIAGNOSIS — Z86.39 PERSONAL HISTORY OF OTHER ENDOCRINE, NUTRITIONAL AND METABOLIC DISEASE: ICD-10-CM

## 2024-10-30 PROCEDURE — G2211 COMPLEX E/M VISIT ADD ON: CPT | Mod: NC

## 2024-10-30 PROCEDURE — 99214 OFFICE O/P EST MOD 30 MIN: CPT | Mod: 25

## 2024-10-30 PROCEDURE — 93000 ELECTROCARDIOGRAM COMPLETE: CPT

## 2024-10-30 PROCEDURE — 99214 OFFICE O/P EST MOD 30 MIN: CPT

## 2024-10-30 RX ORDER — ASPIRIN 325 MG/1
325 TABLET, FILM COATED ORAL DAILY
Refills: 0 | Status: ACTIVE | COMMUNITY
Start: 2024-10-30

## 2024-10-30 RX ORDER — ASPIRIN 81 MG/1
81 TABLET, DELAYED RELEASE ORAL DAILY
Qty: 90 | Refills: 3 | Status: DISCONTINUED | COMMUNITY
Start: 2024-10-30 | End: 2024-10-30

## 2024-10-30 RX ORDER — ATORVASTATIN CALCIUM 80 MG/1
80 TABLET, FILM COATED ORAL
Qty: 30 | Refills: 4 | Status: ACTIVE | COMMUNITY
Start: 2024-10-30

## 2024-11-04 ENCOUNTER — NON-APPOINTMENT (OUTPATIENT)
Age: 59
End: 2024-11-04

## 2024-11-06 ENCOUNTER — APPOINTMENT (OUTPATIENT)
Age: 59
End: 2024-11-06
Payer: MEDICAID

## 2024-11-06 PROCEDURE — 93880 EXTRACRANIAL BILAT STUDY: CPT

## 2024-11-06 PROCEDURE — 93892 TCD EMBOLI DETECT W/O INJ: CPT

## 2024-11-06 PROCEDURE — 93886 INTRACRANIAL COMPLETE STUDY: CPT

## 2024-11-06 RX ORDER — DEXLANSOPRAZOLE 60 MG/1
60 CAPSULE, DELAYED RELEASE ORAL DAILY
Refills: 0 | Status: ACTIVE | COMMUNITY

## 2024-11-06 RX ORDER — CETIRIZINE HYDROCHLORIDE 10 MG/1
10 TABLET, COATED ORAL DAILY
Refills: 0 | Status: ACTIVE | COMMUNITY

## 2024-11-06 RX ORDER — DICYCLOMINE HYDROCHLORIDE 10 MG/1
10 CAPSULE ORAL TWICE DAILY
Refills: 0 | Status: ACTIVE | COMMUNITY

## 2024-11-07 ENCOUNTER — APPOINTMENT (OUTPATIENT)
Age: 59
End: 2024-11-07
Payer: MEDICAID

## 2024-11-07 VITALS — HEART RATE: 96 BPM | DIASTOLIC BLOOD PRESSURE: 88 MMHG | SYSTOLIC BLOOD PRESSURE: 132 MMHG

## 2024-11-07 PROCEDURE — 99215 OFFICE O/P EST HI 40 MIN: CPT | Mod: 25

## 2024-11-07 PROCEDURE — T1013A: CUSTOM

## 2024-11-07 PROCEDURE — G2212 PROLONG OUTPT/OFFICE VIS: CPT

## 2024-11-07 PROCEDURE — 99205 OFFICE O/P NEW HI 60 MIN: CPT | Mod: 25

## 2024-11-13 ENCOUNTER — NON-APPOINTMENT (OUTPATIENT)
Age: 59
End: 2024-11-13

## 2024-11-13 ENCOUNTER — APPOINTMENT (OUTPATIENT)
Dept: CARDIOLOGY | Facility: CLINIC | Age: 59
End: 2024-11-13
Payer: MEDICAID

## 2024-11-13 VITALS — OXYGEN SATURATION: 96 % | HEART RATE: 90 BPM | DIASTOLIC BLOOD PRESSURE: 76 MMHG | SYSTOLIC BLOOD PRESSURE: 110 MMHG

## 2024-11-13 VITALS — WEIGHT: 156 LBS | HEIGHT: 64 IN | BODY MASS INDEX: 26.63 KG/M2

## 2024-11-13 PROCEDURE — 93000 ELECTROCARDIOGRAM COMPLETE: CPT

## 2024-11-13 PROCEDURE — 99204 OFFICE O/P NEW MOD 45 MIN: CPT | Mod: 25

## 2024-11-18 ENCOUNTER — APPOINTMENT (OUTPATIENT)
Dept: UROLOGY | Facility: CLINIC | Age: 59
End: 2024-11-18
Payer: MEDICAID

## 2024-11-18 VITALS
BODY MASS INDEX: 26.63 KG/M2 | HEART RATE: 97 BPM | SYSTOLIC BLOOD PRESSURE: 118 MMHG | HEIGHT: 64 IN | DIASTOLIC BLOOD PRESSURE: 81 MMHG | TEMPERATURE: 97 F | OXYGEN SATURATION: 96 % | WEIGHT: 156 LBS

## 2024-11-18 DIAGNOSIS — N40.1 BENIGN PROSTATIC HYPERPLASIA WITH LOWER URINARY TRACT SYMPMS: ICD-10-CM

## 2024-11-18 DIAGNOSIS — Z95.0 PRESENCE OF CARDIAC PACEMAKER: ICD-10-CM

## 2024-11-18 PROCEDURE — G2211 COMPLEX E/M VISIT ADD ON: CPT | Mod: NC

## 2024-11-18 PROCEDURE — 99204 OFFICE O/P NEW MOD 45 MIN: CPT

## 2024-11-18 RX ORDER — FINASTERIDE 5 MG/1
5 TABLET, FILM COATED ORAL
Qty: 30 | Refills: 1 | Status: ACTIVE | COMMUNITY
Start: 2024-11-18 | End: 1900-01-01

## 2024-11-18 RX ORDER — ALBUTEROL 90 MCG
90 AEROSOL (GRAM) INHALATION
Refills: 0 | Status: ACTIVE | COMMUNITY

## 2024-11-18 RX ORDER — TAMSULOSIN HYDROCHLORIDE 0.4 MG/1
0.4 CAPSULE ORAL
Qty: 30 | Refills: 1 | Status: ACTIVE | COMMUNITY
Start: 2024-11-18 | End: 1900-01-01

## 2024-11-18 RX ORDER — ASPIRIN 81 MG/1
81 TABLET ORAL
Refills: 0 | Status: ACTIVE | COMMUNITY

## 2024-11-19 LAB
APPEARANCE: CLEAR
BACTERIA: NEGATIVE /HPF
BILIRUBIN URINE: NEGATIVE
BLOOD URINE: NEGATIVE
CAST: 1 /LPF
COLOR: YELLOW
EPITHELIAL CELLS: 0 /HPF
GLUCOSE QUALITATIVE U: NEGATIVE MG/DL
KETONES URINE: NEGATIVE MG/DL
LEUKOCYTE ESTERASE URINE: NEGATIVE
MICROSCOPIC-UA: NORMAL
NITRITE URINE: NEGATIVE
PH URINE: 5.5
PROTEIN URINE: NORMAL MG/DL
RED BLOOD CELLS URINE: 0 /HPF
SPECIFIC GRAVITY URINE: 1.02
UROBILINOGEN URINE: 0.2 MG/DL
WHITE BLOOD CELLS URINE: 0 /HPF

## 2024-11-20 LAB — BACTERIA UR CULT: NORMAL

## 2024-11-25 ENCOUNTER — NON-APPOINTMENT (OUTPATIENT)
Age: 59
End: 2024-11-25

## 2024-12-02 ENCOUNTER — APPOINTMENT (OUTPATIENT)
Dept: ELECTROPHYSIOLOGY | Facility: CLINIC | Age: 59
End: 2024-12-02
Payer: MEDICAID

## 2024-12-02 ENCOUNTER — NON-APPOINTMENT (OUTPATIENT)
Age: 59
End: 2024-12-02

## 2024-12-02 PROCEDURE — 93298 REM INTERROG DEV EVAL SCRMS: CPT

## 2024-12-06 ENCOUNTER — APPOINTMENT (OUTPATIENT)
Dept: UROLOGY | Facility: CLINIC | Age: 59
End: 2024-12-06
Payer: MEDICAID

## 2024-12-06 VITALS
DIASTOLIC BLOOD PRESSURE: 84 MMHG | HEIGHT: 64 IN | HEART RATE: 100 BPM | TEMPERATURE: 97.3 F | WEIGHT: 156 LBS | OXYGEN SATURATION: 97 % | BODY MASS INDEX: 26.63 KG/M2 | SYSTOLIC BLOOD PRESSURE: 117 MMHG

## 2024-12-06 DIAGNOSIS — N40.1 BENIGN PROSTATIC HYPERPLASIA WITH LOWER URINARY TRACT SYMPMS: ICD-10-CM

## 2024-12-06 PROCEDURE — G2211 COMPLEX E/M VISIT ADD ON: CPT | Mod: NC

## 2024-12-06 PROCEDURE — 99214 OFFICE O/P EST MOD 30 MIN: CPT

## 2025-01-02 ENCOUNTER — NON-APPOINTMENT (OUTPATIENT)
Age: 60
End: 2025-01-02

## 2025-01-02 ENCOUNTER — APPOINTMENT (OUTPATIENT)
Dept: ELECTROPHYSIOLOGY | Facility: CLINIC | Age: 60
End: 2025-01-02
Payer: MEDICAID

## 2025-01-02 PROCEDURE — 93298 REM INTERROG DEV EVAL SCRMS: CPT

## 2025-01-07 ENCOUNTER — APPOINTMENT (OUTPATIENT)
Dept: NEUROLOGY | Facility: CLINIC | Age: 60
End: 2025-01-07
Payer: MEDICAID

## 2025-01-07 VITALS
SYSTOLIC BLOOD PRESSURE: 129 MMHG | WEIGHT: 150 LBS | BODY MASS INDEX: 25.61 KG/M2 | DIASTOLIC BLOOD PRESSURE: 86 MMHG | HEIGHT: 64 IN | HEART RATE: 109 BPM

## 2025-01-07 DIAGNOSIS — I63.9 CEREBRAL INFARCTION, UNSPECIFIED: ICD-10-CM

## 2025-01-07 PROCEDURE — T1013A: CUSTOM

## 2025-01-07 PROCEDURE — G2212 PROLONG OUTPT/OFFICE VIS: CPT

## 2025-01-07 PROCEDURE — 99215 OFFICE O/P EST HI 40 MIN: CPT | Mod: 25

## 2025-01-07 RX ORDER — GABAPENTIN 100 MG/1
100 CAPSULE ORAL
Qty: 30 | Refills: 3 | Status: ACTIVE | COMMUNITY
Start: 2025-01-07 | End: 1900-01-01

## 2025-01-09 ENCOUNTER — APPOINTMENT (OUTPATIENT)
Dept: UROLOGY | Facility: CLINIC | Age: 60
End: 2025-01-09
Payer: MEDICAID

## 2025-01-09 VITALS
BODY MASS INDEX: 25.61 KG/M2 | WEIGHT: 150 LBS | OXYGEN SATURATION: 95 % | TEMPERATURE: 97 F | SYSTOLIC BLOOD PRESSURE: 136 MMHG | DIASTOLIC BLOOD PRESSURE: 86 MMHG | HEIGHT: 64 IN | HEART RATE: 100 BPM

## 2025-01-09 DIAGNOSIS — N52.9 MALE ERECTILE DYSFUNCTION, UNSPECIFIED: ICD-10-CM

## 2025-01-09 DIAGNOSIS — N40.1 BENIGN PROSTATIC HYPERPLASIA WITH LOWER URINARY TRACT SYMPMS: ICD-10-CM

## 2025-01-09 PROCEDURE — G2211 COMPLEX E/M VISIT ADD ON: CPT | Mod: NC

## 2025-01-09 PROCEDURE — 99214 OFFICE O/P EST MOD 30 MIN: CPT

## 2025-01-09 RX ORDER — SILDENAFIL 50 MG/1
50 TABLET ORAL
Qty: 30 | Refills: 6 | Status: ACTIVE | COMMUNITY
Start: 2025-01-09 | End: 1900-01-01

## 2025-01-13 LAB
APPEARANCE: CLEAR
BACTERIA UR CULT: NORMAL
BACTERIA: NEGATIVE /HPF
BILIRUBIN URINE: NEGATIVE
BLOOD URINE: NEGATIVE
CAST: 1 /LPF
COLOR: YELLOW
EPITHELIAL CELLS: 0 /HPF
GLUCOSE QUALITATIVE U: NEGATIVE MG/DL
KETONES URINE: NEGATIVE MG/DL
LEUKOCYTE ESTERASE URINE: NEGATIVE
MICROSCOPIC-UA: NORMAL
NITRITE URINE: NEGATIVE
PH URINE: 6
PROTEIN URINE: NEGATIVE MG/DL
PSA FREE FLD-MCNC: 24 %
PSA FREE SERPL-MCNC: 0.79 NG/ML
PSA SERPL-MCNC: 3.27 NG/ML
RED BLOOD CELLS URINE: 0 /HPF
SPECIFIC GRAVITY URINE: 1.01
UROBILINOGEN URINE: 0.2 MG/DL
WHITE BLOOD CELLS URINE: 0 /HPF

## 2025-02-05 ENCOUNTER — NON-APPOINTMENT (OUTPATIENT)
Age: 60
End: 2025-02-05

## 2025-02-05 ENCOUNTER — APPOINTMENT (OUTPATIENT)
Dept: ELECTROPHYSIOLOGY | Facility: CLINIC | Age: 60
End: 2025-02-05
Payer: MEDICAID

## 2025-02-05 PROCEDURE — 93298 REM INTERROG DEV EVAL SCRMS: CPT

## 2025-02-19 ENCOUNTER — APPOINTMENT (OUTPATIENT)
Dept: CARDIOLOGY | Facility: CLINIC | Age: 60
End: 2025-02-19

## 2025-03-13 ENCOUNTER — NON-APPOINTMENT (OUTPATIENT)
Age: 60
End: 2025-03-13

## 2025-03-13 ENCOUNTER — APPOINTMENT (OUTPATIENT)
Dept: ELECTROPHYSIOLOGY | Facility: CLINIC | Age: 60
End: 2025-03-13
Payer: MEDICAID

## 2025-03-13 PROCEDURE — 93298 REM INTERROG DEV EVAL SCRMS: CPT

## 2025-04-16 ENCOUNTER — APPOINTMENT (OUTPATIENT)
Dept: NEUROLOGY | Facility: CLINIC | Age: 60
End: 2025-04-16
Payer: MEDICAID

## 2025-04-16 ENCOUNTER — NON-APPOINTMENT (OUTPATIENT)
Age: 60
End: 2025-04-16

## 2025-04-16 VITALS
HEIGHT: 64 IN | WEIGHT: 145 LBS | DIASTOLIC BLOOD PRESSURE: 70 MMHG | BODY MASS INDEX: 24.75 KG/M2 | HEART RATE: 76 BPM | SYSTOLIC BLOOD PRESSURE: 109 MMHG

## 2025-04-16 DIAGNOSIS — I63.9 CEREBRAL INFARCTION, UNSPECIFIED: ICD-10-CM

## 2025-04-16 PROCEDURE — 99214 OFFICE O/P EST MOD 30 MIN: CPT | Mod: 25

## 2025-04-16 PROCEDURE — 93892 TCD EMBOLI DETECT W/O INJ: CPT

## 2025-04-16 PROCEDURE — 93886 INTRACRANIAL COMPLETE STUDY: CPT

## 2025-04-16 PROCEDURE — 93880 EXTRACRANIAL BILAT STUDY: CPT

## 2025-04-16 PROCEDURE — T1013: CPT

## 2025-04-17 ENCOUNTER — APPOINTMENT (OUTPATIENT)
Dept: ELECTROPHYSIOLOGY | Facility: CLINIC | Age: 60
End: 2025-04-17
Payer: MEDICAID

## 2025-04-17 ENCOUNTER — NON-APPOINTMENT (OUTPATIENT)
Age: 60
End: 2025-04-17

## 2025-04-17 PROCEDURE — 93298 REM INTERROG DEV EVAL SCRMS: CPT

## 2025-05-15 ENCOUNTER — APPOINTMENT (OUTPATIENT)
Dept: UROLOGY | Facility: CLINIC | Age: 60
End: 2025-05-15
Payer: MEDICAID

## 2025-05-15 VITALS
TEMPERATURE: 96.8 F | DIASTOLIC BLOOD PRESSURE: 73 MMHG | OXYGEN SATURATION: 98 % | HEART RATE: 83 BPM | SYSTOLIC BLOOD PRESSURE: 109 MMHG

## 2025-05-15 DIAGNOSIS — N40.1 BENIGN PROSTATIC HYPERPLASIA WITH LOWER URINARY TRACT SYMPMS: ICD-10-CM

## 2025-05-15 DIAGNOSIS — N52.9 MALE ERECTILE DYSFUNCTION, UNSPECIFIED: ICD-10-CM

## 2025-05-15 PROCEDURE — G2211 COMPLEX E/M VISIT ADD ON: CPT | Mod: NC

## 2025-05-15 PROCEDURE — 99214 OFFICE O/P EST MOD 30 MIN: CPT

## 2025-05-19 ENCOUNTER — NON-APPOINTMENT (OUTPATIENT)
Age: 60
End: 2025-05-19

## 2025-05-19 ENCOUNTER — APPOINTMENT (OUTPATIENT)
Dept: ELECTROPHYSIOLOGY | Facility: CLINIC | Age: 60
End: 2025-05-19
Payer: MEDICAID

## 2025-05-19 PROCEDURE — 93298 REM INTERROG DEV EVAL SCRMS: CPT

## 2025-06-10 NOTE — ED PROVIDER NOTE - WET READ LAUNCH FT
CTAP showing severe wall thickening and luminal narrowing of an approximately 7 cm   segment of the terminal ileum with mild wall thickening of the cecum. lactate elevated but no leukocytosis. Will treat with flagyl and ceftriaxone. Plan for admission Dr. Cagle, received clinical sign-out from Dr. Engel . I evaluated the patient at the time of sign-out and reviewed the plan of care, including all relevant diagnostic workup. On my assessment, the patient is clinically stable with no acute changes noted. Vital signs are within acceptable parameters, and there are no immediate concerns. Ongoing management has been confirmed, and the patient remains under observation. Pending items include:Admission to regional medicine I added blood cultures and urinalysis.  Patient is resting comfortably aware that she will be admitted There are no Wet Read(s) to document.

## 2025-06-20 ENCOUNTER — NON-APPOINTMENT (OUTPATIENT)
Age: 60
End: 2025-06-20

## 2025-06-20 ENCOUNTER — APPOINTMENT (OUTPATIENT)
Dept: ELECTROPHYSIOLOGY | Facility: CLINIC | Age: 60
End: 2025-06-20
Payer: MEDICAID

## 2025-06-20 PROCEDURE — 93298 REM INTERROG DEV EVAL SCRMS: CPT

## 2025-07-25 ENCOUNTER — NON-APPOINTMENT (OUTPATIENT)
Age: 60
End: 2025-07-25

## 2025-07-25 ENCOUNTER — APPOINTMENT (OUTPATIENT)
Dept: ELECTROPHYSIOLOGY | Facility: CLINIC | Age: 60
End: 2025-07-25
Payer: MEDICAID

## 2025-07-25 PROCEDURE — 93298 REM INTERROG DEV EVAL SCRMS: CPT

## 2025-08-28 ENCOUNTER — APPOINTMENT (OUTPATIENT)
Dept: ELECTROPHYSIOLOGY | Facility: CLINIC | Age: 60
End: 2025-08-28
Payer: MEDICAID

## 2025-08-28 ENCOUNTER — NON-APPOINTMENT (OUTPATIENT)
Age: 60
End: 2025-08-28

## 2025-08-28 PROCEDURE — 93298 REM INTERROG DEV EVAL SCRMS: CPT

## 2025-09-16 ENCOUNTER — APPOINTMENT (OUTPATIENT)
Dept: PAIN MANAGEMENT | Facility: CLINIC | Age: 60
End: 2025-09-16